# Patient Record
Sex: FEMALE | Race: BLACK OR AFRICAN AMERICAN | NOT HISPANIC OR LATINO | URBAN - METROPOLITAN AREA
[De-identification: names, ages, dates, MRNs, and addresses within clinical notes are randomized per-mention and may not be internally consistent; named-entity substitution may affect disease eponyms.]

---

## 2017-09-07 ENCOUNTER — OUTPATIENT (OUTPATIENT)
Dept: OUTPATIENT SERVICES | Facility: HOSPITAL | Age: 57
LOS: 1 days | Discharge: HOME | End: 2017-09-07
Payer: COMMERCIAL

## 2017-09-07 DIAGNOSIS — R19.4 CHANGE IN BOWEL HABIT: ICD-10-CM

## 2017-09-07 LAB
ANION GAP SERPL CALC-SCNC: 5 MMOL/L — SIGNIFICANT CHANGE UP (ref 5–17)
BASOPHILS # BLD AUTO: 0.1 K/UL — SIGNIFICANT CHANGE UP (ref 0–0.2)
BASOPHILS NFR BLD AUTO: 1.8 % — SIGNIFICANT CHANGE UP (ref 0–2)
BUN SERPL-MCNC: 6 MG/DL — LOW (ref 7–23)
CALCIUM SERPL-MCNC: 8.6 MG/DL — SIGNIFICANT CHANGE UP (ref 8.5–10.1)
CHLORIDE SERPL-SCNC: 98 MMOL/L — SIGNIFICANT CHANGE UP (ref 96–108)
CO2 SERPL-SCNC: 29 MMOL/L — SIGNIFICANT CHANGE UP (ref 22–31)
CREAT SERPL-MCNC: 0.83 MG/DL — SIGNIFICANT CHANGE UP (ref 0.5–1.3)
EOSINOPHIL # BLD AUTO: 0.1 K/UL — SIGNIFICANT CHANGE UP (ref 0–0.5)
EOSINOPHIL NFR BLD AUTO: 1.8 % — SIGNIFICANT CHANGE UP (ref 0–6)
GLUCOSE SERPL-MCNC: 218 MG/DL — HIGH (ref 70–99)
HCT VFR BLD CALC: 37.1 % — SIGNIFICANT CHANGE UP (ref 34.5–45)
HGB BLD-MCNC: 12.5 G/DL — SIGNIFICANT CHANGE UP (ref 11.5–15.5)
LYMPHOCYTES # BLD AUTO: 2.9 K/UL — SIGNIFICANT CHANGE UP (ref 1–3.3)
LYMPHOCYTES # BLD AUTO: 37.2 % — SIGNIFICANT CHANGE UP (ref 13–44)
MCHC RBC-ENTMCNC: 29.7 PG — SIGNIFICANT CHANGE UP (ref 27–34)
MCHC RBC-ENTMCNC: 33.7 GM/DL — SIGNIFICANT CHANGE UP (ref 32–36)
MCV RBC AUTO: 88.4 FL — SIGNIFICANT CHANGE UP (ref 80–100)
MONOCYTES # BLD AUTO: 0.5 K/UL — SIGNIFICANT CHANGE UP (ref 0–0.9)
MONOCYTES NFR BLD AUTO: 6.9 % — SIGNIFICANT CHANGE UP (ref 2–14)
NEUTROPHILS # BLD AUTO: 4.1 K/UL — SIGNIFICANT CHANGE UP (ref 1.8–7.4)
NEUTROPHILS NFR BLD AUTO: 52.3 % — SIGNIFICANT CHANGE UP (ref 43–77)
PLATELET # BLD AUTO: 213 K/UL — SIGNIFICANT CHANGE UP (ref 150–400)
POTASSIUM SERPL-MCNC: 3.7 MMOL/L — SIGNIFICANT CHANGE UP (ref 3.5–5.3)
POTASSIUM SERPL-SCNC: 3.7 MMOL/L — SIGNIFICANT CHANGE UP (ref 3.5–5.3)
RBC # BLD: 4.2 M/UL — SIGNIFICANT CHANGE UP (ref 3.8–5.2)
RBC # FLD: 13 % — SIGNIFICANT CHANGE UP (ref 10.3–14.5)
SODIUM SERPL-SCNC: 132 MMOL/L — LOW (ref 135–145)
WBC # BLD: 7.9 K/UL — SIGNIFICANT CHANGE UP (ref 3.8–10.5)
WBC # FLD AUTO: 7.9 K/UL — SIGNIFICANT CHANGE UP (ref 3.8–10.5)

## 2017-09-07 PROCEDURE — 93010 ELECTROCARDIOGRAM REPORT: CPT

## 2017-09-12 ENCOUNTER — OUTPATIENT (OUTPATIENT)
Dept: OUTPATIENT SERVICES | Facility: HOSPITAL | Age: 57
LOS: 1 days | Discharge: HOME | End: 2017-09-12

## 2017-09-12 VITALS
WEIGHT: 293 LBS | HEART RATE: 53 BPM | SYSTOLIC BLOOD PRESSURE: 152 MMHG | HEIGHT: 67 IN | DIASTOLIC BLOOD PRESSURE: 82 MMHG | TEMPERATURE: 97 F | OXYGEN SATURATION: 96 % | RESPIRATION RATE: 12 BRPM

## 2017-09-12 DIAGNOSIS — Z98.84 BARIATRIC SURGERY STATUS: Chronic | ICD-10-CM

## 2017-09-12 LAB — HCG UR QL: NEGATIVE — SIGNIFICANT CHANGE UP

## 2017-09-12 RX ORDER — SODIUM CHLORIDE 9 MG/ML
1000 INJECTION INTRAMUSCULAR; INTRAVENOUS; SUBCUTANEOUS
Qty: 0 | Refills: 0 | Status: DISCONTINUED | OUTPATIENT
Start: 2017-09-12 | End: 2017-09-27

## 2017-09-12 RX ADMIN — SODIUM CHLORIDE 75 MILLILITER(S): 9 INJECTION INTRAMUSCULAR; INTRAVENOUS; SUBCUTANEOUS at 08:30

## 2017-09-18 DIAGNOSIS — E11.9 TYPE 2 DIABETES MELLITUS WITHOUT COMPLICATIONS: ICD-10-CM

## 2017-09-18 DIAGNOSIS — R15.9 FULL INCONTINENCE OF FECES: ICD-10-CM

## 2017-09-18 DIAGNOSIS — I10 ESSENTIAL (PRIMARY) HYPERTENSION: ICD-10-CM

## 2017-09-18 DIAGNOSIS — Z98.84 BARIATRIC SURGERY STATUS: ICD-10-CM

## 2017-09-18 DIAGNOSIS — E66.01 MORBID (SEVERE) OBESITY DUE TO EXCESS CALORIES: ICD-10-CM

## 2017-09-18 DIAGNOSIS — F31.9 BIPOLAR DISORDER, UNSPECIFIED: ICD-10-CM

## 2018-11-12 ENCOUNTER — INPATIENT (INPATIENT)
Facility: HOSPITAL | Age: 58
LOS: 5 days | Discharge: HOME HEALTH | End: 2018-11-18
Attending: FAMILY MEDICINE | Admitting: FAMILY MEDICINE
Payer: COMMERCIAL

## 2018-11-12 VITALS — OXYGEN SATURATION: 87 % | WEIGHT: 279.99 LBS

## 2018-11-12 DIAGNOSIS — Z98.84 BARIATRIC SURGERY STATUS: Chronic | ICD-10-CM

## 2018-11-12 PROBLEM — I10 ESSENTIAL (PRIMARY) HYPERTENSION: Chronic | Status: ACTIVE | Noted: 2017-09-12

## 2018-11-12 PROBLEM — F31.9 BIPOLAR DISORDER, UNSPECIFIED: Chronic | Status: ACTIVE | Noted: 2017-09-12

## 2018-11-12 LAB
ADD ON TEST-SPECIMEN IN LAB: SIGNIFICANT CHANGE UP
ALBUMIN SERPL ELPH-MCNC: 2.9 G/DL — LOW (ref 3.3–5)
ALP SERPL-CCNC: 101 U/L — SIGNIFICANT CHANGE UP (ref 40–120)
ALT FLD-CCNC: 31 U/L — SIGNIFICANT CHANGE UP (ref 12–78)
ANION GAP SERPL CALC-SCNC: 7 MMOL/L — SIGNIFICANT CHANGE UP (ref 5–17)
APPEARANCE UR: CLEAR — SIGNIFICANT CHANGE UP
APTT BLD: 30.2 SEC — SIGNIFICANT CHANGE UP (ref 27.5–36.3)
AST SERPL-CCNC: 30 U/L — SIGNIFICANT CHANGE UP (ref 15–37)
BASE EXCESS BLDA CALC-SCNC: 10.2 MMOL/L — HIGH (ref -2–2)
BASE EXCESS BLDV CALC-SCNC: 12.8 MMOL/L — HIGH (ref -2–2)
BASOPHILS # BLD AUTO: 0.03 K/UL — SIGNIFICANT CHANGE UP (ref 0–0.2)
BASOPHILS NFR BLD AUTO: 0.6 % — SIGNIFICANT CHANGE UP (ref 0–2)
BILIRUB SERPL-MCNC: 0.7 MG/DL — SIGNIFICANT CHANGE UP (ref 0.2–1.2)
BILIRUB UR-MCNC: NEGATIVE — SIGNIFICANT CHANGE UP
BLOOD GAS COMMENTS ARTERIAL: SIGNIFICANT CHANGE UP
BUN SERPL-MCNC: 10 MG/DL — SIGNIFICANT CHANGE UP (ref 7–23)
CALCIUM SERPL-MCNC: 7.7 MG/DL — LOW (ref 8.5–10.1)
CHLORIDE SERPL-SCNC: 90 MMOL/L — LOW (ref 96–108)
CO2 SERPL-SCNC: 37 MMOL/L — HIGH (ref 22–31)
COLOR SPEC: YELLOW — SIGNIFICANT CHANGE UP
CREAT SERPL-MCNC: 0.97 MG/DL — SIGNIFICANT CHANGE UP (ref 0.5–1.3)
DIFF PNL FLD: NEGATIVE — SIGNIFICANT CHANGE UP
EOSINOPHIL # BLD AUTO: 0.03 K/UL — SIGNIFICANT CHANGE UP (ref 0–0.5)
EOSINOPHIL NFR BLD AUTO: 0.6 % — SIGNIFICANT CHANGE UP (ref 0–6)
FLUBV RNA SPEC QL NAA+PROBE: DETECTED
GAS PNL BLDV: SIGNIFICANT CHANGE UP
GLUCOSE SERPL-MCNC: 359 MG/DL — HIGH (ref 70–99)
GLUCOSE UR QL: 1000 MG/DL
HCO3 BLDA-SCNC: 35 MMOL/L — HIGH (ref 21–29)
HCO3 BLDV-SCNC: 39 MMOL/L — HIGH (ref 21–29)
HCT VFR BLD CALC: 38.3 % — SIGNIFICANT CHANGE UP (ref 34.5–45)
HGB BLD-MCNC: 12.2 G/DL — SIGNIFICANT CHANGE UP (ref 11.5–15.5)
IMM GRANULOCYTES NFR BLD AUTO: 0.4 % — SIGNIFICANT CHANGE UP (ref 0–1.5)
INR BLD: 1.5 RATIO — HIGH (ref 0.88–1.16)
KETONES UR-MCNC: NEGATIVE — SIGNIFICANT CHANGE UP
LACTATE SERPL-SCNC: 1.8 MMOL/L — SIGNIFICANT CHANGE UP (ref 0.7–2)
LEUKOCYTE ESTERASE UR-ACNC: NEGATIVE — SIGNIFICANT CHANGE UP
LIDOCAIN IGE QN: 31 U/L — LOW (ref 73–393)
LYMPHOCYTES # BLD AUTO: 1.4 K/UL — SIGNIFICANT CHANGE UP (ref 1–3.3)
LYMPHOCYTES # BLD AUTO: 26.3 % — SIGNIFICANT CHANGE UP (ref 13–44)
MAGNESIUM SERPL-MCNC: 1.6 MG/DL — SIGNIFICANT CHANGE UP (ref 1.6–2.6)
MCHC RBC-ENTMCNC: 29 PG — SIGNIFICANT CHANGE UP (ref 27–34)
MCHC RBC-ENTMCNC: 31.9 GM/DL — LOW (ref 32–36)
MCV RBC AUTO: 91 FL — SIGNIFICANT CHANGE UP (ref 80–100)
MONOCYTES # BLD AUTO: 0.55 K/UL — SIGNIFICANT CHANGE UP (ref 0–0.9)
MONOCYTES NFR BLD AUTO: 10.3 % — SIGNIFICANT CHANGE UP (ref 2–14)
NEUTROPHILS # BLD AUTO: 3.29 K/UL — SIGNIFICANT CHANGE UP (ref 1.8–7.4)
NEUTROPHILS NFR BLD AUTO: 61.8 % — SIGNIFICANT CHANGE UP (ref 43–77)
NITRITE UR-MCNC: NEGATIVE — SIGNIFICANT CHANGE UP
NT-PROBNP SERPL-SCNC: 5068 PG/ML — HIGH (ref 0–125)
PCO2 BLDA: 50 MMHG — HIGH (ref 32–46)
PCO2 BLDV: 57 MMHG — HIGH (ref 35–50)
PH BLDA: 7.46 — HIGH (ref 7.35–7.45)
PH BLDV: 7.45 — SIGNIFICANT CHANGE UP (ref 7.35–7.45)
PH UR: 7 — SIGNIFICANT CHANGE UP (ref 5–8)
PHOSPHATE SERPL-MCNC: 2.7 MG/DL — SIGNIFICANT CHANGE UP (ref 2.5–4.5)
PLATELET # BLD AUTO: 207 K/UL — SIGNIFICANT CHANGE UP (ref 150–400)
PO2 BLDA: 68 MMHG — LOW (ref 74–108)
PO2 BLDV: 48 MMHG — HIGH (ref 25–45)
POTASSIUM SERPL-MCNC: 2.5 MMOL/L — CRITICAL LOW (ref 3.5–5.3)
POTASSIUM SERPL-SCNC: 2.5 MMOL/L — CRITICAL LOW (ref 3.5–5.3)
PROT SERPL-MCNC: 6.8 GM/DL — SIGNIFICANT CHANGE UP (ref 6–8.3)
PROT UR-MCNC: NEGATIVE MG/DL — SIGNIFICANT CHANGE UP
PROTHROM AB SERPL-ACNC: 16.9 SEC — HIGH (ref 10–12.9)
RAPID RVP RESULT: DETECTED
RBC # BLD: 4.21 M/UL — SIGNIFICANT CHANGE UP (ref 3.8–5.2)
RBC # FLD: 15.2 % — HIGH (ref 10.3–14.5)
SAO2 % BLDA: 90 % — LOW (ref 92–96)
SAO2 % BLDV: 78 % — SIGNIFICANT CHANGE UP (ref 67–88)
SODIUM SERPL-SCNC: 134 MMOL/L — LOW (ref 135–145)
SP GR SPEC: 1 — LOW (ref 1.01–1.02)
TROPONIN I SERPL-MCNC: 0.04 NG/ML — SIGNIFICANT CHANGE UP (ref 0.01–0.04)
UROBILINOGEN FLD QL: NEGATIVE MG/DL — SIGNIFICANT CHANGE UP
WBC # BLD: 5.32 K/UL — SIGNIFICANT CHANGE UP (ref 3.8–10.5)
WBC # FLD AUTO: 5.32 K/UL — SIGNIFICANT CHANGE UP (ref 3.8–10.5)

## 2018-11-12 PROCEDURE — 71275 CT ANGIOGRAPHY CHEST: CPT | Mod: 26

## 2018-11-12 PROCEDURE — 99285 EMERGENCY DEPT VISIT HI MDM: CPT

## 2018-11-12 PROCEDURE — 93010 ELECTROCARDIOGRAM REPORT: CPT

## 2018-11-12 PROCEDURE — 71045 X-RAY EXAM CHEST 1 VIEW: CPT | Mod: 26

## 2018-11-12 RX ORDER — POTASSIUM CHLORIDE 20 MEQ
10 PACKET (EA) ORAL ONCE
Qty: 0 | Refills: 0 | Status: COMPLETED | OUTPATIENT
Start: 2018-11-12 | End: 2018-11-12

## 2018-11-12 RX ORDER — GLIMEPIRIDE 1 MG
1 TABLET ORAL
Qty: 0 | Refills: 0 | COMMUNITY

## 2018-11-12 RX ORDER — VENLAFAXINE HCL 75 MG
1 CAPSULE, EXT RELEASE 24 HR ORAL
Qty: 0 | Refills: 0 | COMMUNITY

## 2018-11-12 RX ORDER — POTASSIUM CHLORIDE 20 MEQ
40 PACKET (EA) ORAL ONCE
Qty: 0 | Refills: 0 | Status: COMPLETED | OUTPATIENT
Start: 2018-11-12 | End: 2018-11-12

## 2018-11-12 RX ORDER — SODIUM CHLORIDE 9 MG/ML
1000 INJECTION INTRAMUSCULAR; INTRAVENOUS; SUBCUTANEOUS ONCE
Qty: 0 | Refills: 0 | Status: COMPLETED | OUTPATIENT
Start: 2018-11-12 | End: 2018-11-12

## 2018-11-12 RX ORDER — OLANZAPINE 15 MG/1
1 TABLET, FILM COATED ORAL
Qty: 0 | Refills: 0 | COMMUNITY

## 2018-11-12 RX ORDER — CEFTRIAXONE 500 MG/1
1000 INJECTION, POWDER, FOR SOLUTION INTRAMUSCULAR; INTRAVENOUS EVERY 24 HOURS
Qty: 0 | Refills: 0 | Status: DISCONTINUED | OUTPATIENT
Start: 2018-11-12 | End: 2018-11-13

## 2018-11-12 RX ORDER — CITALOPRAM 10 MG/1
1 TABLET, FILM COATED ORAL
Qty: 0 | Refills: 0 | COMMUNITY

## 2018-11-12 RX ORDER — ALBUTEROL 90 UG/1
2.5 AEROSOL, METERED ORAL ONCE
Qty: 0 | Refills: 0 | Status: COMPLETED | OUTPATIENT
Start: 2018-11-12 | End: 2018-11-12

## 2018-11-12 RX ORDER — ALPRAZOLAM 0.25 MG
1 TABLET ORAL
Qty: 0 | Refills: 0 | COMMUNITY

## 2018-11-12 RX ORDER — AZITHROMYCIN 500 MG/1
500 TABLET, FILM COATED ORAL EVERY 24 HOURS
Qty: 0 | Refills: 0 | Status: DISCONTINUED | OUTPATIENT
Start: 2018-11-12 | End: 2018-11-13

## 2018-11-12 RX ADMIN — Medication 10 MILLIEQUIVALENT(S): at 18:15

## 2018-11-12 RX ADMIN — SODIUM CHLORIDE 1000 MILLILITER(S): 9 INJECTION INTRAMUSCULAR; INTRAVENOUS; SUBCUTANEOUS at 18:15

## 2018-11-12 RX ADMIN — AZITHROMYCIN 500 MILLIGRAM(S): 500 TABLET, FILM COATED ORAL at 17:15

## 2018-11-12 RX ADMIN — Medication 40 MILLIEQUIVALENT(S): at 17:07

## 2018-11-12 RX ADMIN — AZITHROMYCIN 255 MILLIGRAM(S): 500 TABLET, FILM COATED ORAL at 16:31

## 2018-11-12 RX ADMIN — SODIUM CHLORIDE 500 MILLILITER(S): 9 INJECTION INTRAMUSCULAR; INTRAVENOUS; SUBCUTANEOUS at 15:25

## 2018-11-12 RX ADMIN — ALBUTEROL 2.5 MILLIGRAM(S): 90 AEROSOL, METERED ORAL at 16:36

## 2018-11-12 RX ADMIN — CEFTRIAXONE 1000 MILLIGRAM(S): 500 INJECTION, POWDER, FOR SOLUTION INTRAMUSCULAR; INTRAVENOUS at 16:29

## 2018-11-12 RX ADMIN — Medication 100 MILLIEQUIVALENT(S): at 16:31

## 2018-11-12 RX ADMIN — Medication 10 MILLIEQUIVALENT(S): at 20:00

## 2018-11-12 RX ADMIN — Medication 100 MILLIEQUIVALENT(S): at 18:47

## 2018-11-12 NOTE — ED PROVIDER NOTE - ATTENDING CONTRIBUTION TO CARE
Dr. Álvarez: I have personally performed a face to face bedside history and physical examination of this patient. I have discussed the history, examination, review of systems, assessment and plan of management with the resident. I have reviewed the electronic medical record and amended it to reflect my history, review of systems, physical exam, assessment and plan.

## 2018-11-12 NOTE — ED PROVIDER NOTE - CARE PLAN
Principal Discharge DX:	Influenza B  Secondary Diagnosis:	Hypoxia  Secondary Diagnosis:	Acute congestive heart failure, unspecified heart failure type

## 2018-11-12 NOTE — H&P ADULT - NSHPOUTPATIENTPROVIDERS_GEN_ALL_CORE
[facility]; Dr. Castillo  Pulmonology; Dr. Lopez PCP; Dr. Castillo  Pulmonology; Dr. Lopez  Psychiatry; Dr. Carlos Dominguez

## 2018-11-12 NOTE — H&P ADULT - ASSESSMENT
57 y/o F PMHx significant for hypertension, bipolar disorder, and diabetes mellitus type 2, who was referred to the ED by her PCP for further evaluation of a 1 week hx of progressive generalized weakness/malaise, subjective fevers, shortness of breath/hypoxia (SaO2 81%), and associated cough. The patient was initially evaluated at a local urgent care center earlier last week (11/6) and was prescribed a course of Azithromycin which she completed as prescribed without improvement prompting her second visit (11/10) to the local urgent care center where she was prescribed a 10day regimen. The patient's symptoms progressed further prompting her visit to her PCP today (11/12). The patient was evaluated/co-managed w/ Pulmonology who referred the patient to the ED. In the ED the patient was found to be hypoxic SaO2 87%. Labs=> Na 134, K 2.5, HCO3 37, , pro-BNP 5068, ABG 7.46/50/68/90. RVP (+). CTA Chest => No pulmonary embolism. No infiltrate. Cardiomegaly. Findings suggesting pulmonary hypertension. Mosaic attenuation of the lungs suggesting air trapping. 57 y/o F PMHx significant for hypertension, bipolar disorder, and diabetes mellitus type 2, who was referred to the ED by her PCP for further evaluation of a 1 week hx of progressive generalized weakness/malaise, subjective fevers, shortness of breath/hypoxia (SaO2 81%), and associated cough. The patient was initially evaluated at a local urgent care center earlier last week (11/6) and was prescribed a course of Azithromycin which she completed as prescribed without improvement prompting her second visit (11/10) to the local urgent care center where she was prescribed a 10day regimen. The patient's symptoms progressed further prompting her visit to her PCP today (11/12). The patient was evaluated/co-managed w/ Pulmonology who referred the patient to the ED. In the ED the patient was found to be hypoxic SaO2 87%. Labs=> Na 134, K 2.5, HCO3 37, , pro-BNP 5068, ABG 7.46/50/68/90. RVP (+). CTA Chest => No pulmonary embolism. No infiltrate. Cardiomegaly. Findings suggesting pulmonary hypertension. Mosaic attenuation of the lungs suggesting air trapping.    1)Influenza B complicated by Acute Hypoxic Respiratory Failure  ~admit to Telemetry  ~f/u PAN C+S  ~cont. trial BiPAP  ~repeat ABG in the am  ~f/u w/ Pulmonology in the am  ~ 59 y/o F PMHx significant for hypertension, bipolar disorder, and diabetes mellitus type 2, who was referred to the ED by her PCP for further evaluation of a 1 week hx of progressive generalized weakness/malaise, subjective fevers, shortness of breath/hypoxia (SaO2 81%), and associated cough. The patient was initially evaluated at a local urgent care center earlier last week (11/6) and was prescribed a course of Azithromycin which she completed as prescribed without improvement prompting her second visit (11/10) to the local urgent care center where she was prescribed a 10day regimen. The patient's symptoms progressed further prompting her visit to her PCP today (11/12). The patient was evaluated/co-managed w/ Pulmonology who referred the patient to the ED. In the ED the patient was found to be hypoxic SaO2 87%. Labs=> Na 134, K 2.5, HCO3 37, , pro-BNP 5068, ABG 7.46/50/68/90. RVP (+). CTA Chest => No pulmonary embolism. No infiltrate. Cardiomegaly. Findings suggesting pulmonary hypertension. Mosaic attenuation of the lungs suggesting air trapping.    1)Influenza B complicated by Acute Hypoxic Respiratory Failure  ~admit to Telemetry  ~f/u PAN C+S  ~cont. trial BiPAP  ~repeat ABG in the am  ~f/u w/ Pulmonology in the am 59 y/o F PMHx significant for hypertension, bipolar disorder, anxiety, ADHD, and diabetes mellitus type 2, who was referred to the ED by her PCP for further evaluation of a 1 week hx of progressive generalized weakness/malaise, subjective fevers, shortness of breath/hypoxia (SaO2 81%), and associated cough. The patient was initially evaluated at a local urgent care center earlier last week (11/6) and was prescribed a course of Azithromycin which she completed as prescribed without improvement prompting her second visit (11/10) to the local urgent care center where she was prescribed a 10day regimen. The patient's symptoms progressed further prompting her visit to her PCP today (11/12). The patient was evaluated/co-managed w/ Pulmonology who referred the patient to the ED. In the ED the patient was found to be hypoxic SaO2 87%. Labs=> Na 134, K 2.5, HCO3 37, , pro-BNP 5068, ABG 7.46/50/68/90. RVP (+). CTA Chest => No pulmonary embolism. No infiltrate. Cardiomegaly. Findings suggesting pulmonary hypertension. Mosaic attenuation of the lungs suggesting air trapping.    1)Influenza B complicated by Acute Hypoxic Respiratory Failure  ~admit to Telemetry  ~f/u PAN C+S  ~cont. trial BiPAP  ~repeat ABG in the am  ~f/u w/ Pulmonology in the am    2)Hypertension  ~cont. Losartan 100mg po daily    3)Bipolar disorder/Anxiety/Depression  ~cont. Aripiprazole 15mg po daily  ~cont. Risperidone 2mg po bid  ~cont. Olanzapine 5mg po qHS  ~cont. Effexor XR 75mg 3tabs po daily  ~cont. Citalopram 20mg po daily  ~cont. Xanax 2mg po qid    4)Diabetes mellitus type 2  ~FS qAC/HS  ~cont. ISS per protocol    5)Vte ppx  IMPROVE VTE Risk Score is 0  [  ] Previous VTE                                                  3  [  ] Thrombophilia                                               2  [  ] Lower limb paralysis                                      2        (unable to hold up >15 seconds)    [  ] Current Cancer                                              2         (within 6 months)  [  ] Immobilization > 24 hrs                                1  [  ] ICU/CCU stay > 24 hours                              1  [  ] Age > 60                                                      1  ~cont. SCDs for now 57 y/o F PMHx significant for hypertension, bipolar disorder, anxiety, ADHD, and diabetes mellitus type 2, who was referred to the ED by her PCP for further evaluation of a 1 week hx of progressive generalized weakness/malaise, subjective fevers, shortness of breath/hypoxia (SaO2 81%), and associated cough. The patient was initially evaluated at a local urgent care center earlier last week (11/6) and was prescribed a course of Azithromycin which she completed as prescribed without improvement prompting her second visit (11/10) to the local urgent care center where she was prescribed a 10day regimen. The patient's symptoms progressed further prompting her visit to her PCP today (11/12). The patient was evaluated/co-managed w/ Pulmonology who referred the patient to the ED. In the ED the patient was found to be hypoxic SaO2 87%. Labs=> Na 134, K 2.5, HCO3 37, , pro-BNP 5068, ABG 7.46/50/68/90. RVP (+). CTA Chest => No pulmonary embolism. No infiltrate. Cardiomegaly. Findings suggesting pulmonary hypertension. Mosaic attenuation of the lungs suggesting air trapping.    1)Influenza B complicated by Acute Hypoxic Respiratory Failure  ~admit to Telemetry  ~f/u PAN C+S  ~cont. trial BiPAP   ~repeat ABG in the am  ~f/u w/ Pulmonology in the am    2)Elevated pro-BNP  ~DDx includes demand ischemia vs component of CHF?   ~f/u 2DECHO in the am   ~serial Sendy/EKGs  ~f/u w/ Cardiology in the am    3)Hypertension  ~cont. Losartan 100mg po daily    4)Bipolar disorder/Anxiety/Depression  ~cont. Aripiprazole 15mg po daily  ~cont. Risperidone 2mg po bid  ~cont. Olanzapine 5mg po qHS  ~cont. Effexor XR 75mg 3tabs po daily  ~cont. Citalopram 20mg po daily  ~cont. Xanax 2mg po qid  ~consider consulting Psychiatry (poly-pharmacy?)    5)Diabetes mellitus type 2  ~FS qAC/HS  ~cont. ISS per protocol    6)Hypokalemia  ~supplemented in the ED  ~f/u am labs    7)Vte ppx  IMPROVE VTE Risk Score is 0  [  ] Previous VTE                                                  3  [  ] Thrombophilia                                               2  [  ] Lower limb paralysis                                      2        (unable to hold up >15 seconds)    [  ] Current Cancer                                              2         (within 6 months)  [  ] Immobilization > 24 hrs                                1  [  ] ICU/CCU stay > 24 hours                              1  [  ] Age > 60                                                      1  ~cont. SCDs for now 59 y/o F PMHx significant for hypertension, bipolar disorder, anxiety, ADHD, and diabetes mellitus type 2, who was referred to the ED by her PCP for further evaluation of a 1 week hx of progressive generalized weakness/malaise, subjective fevers, shortness of breath/hypoxia (SaO2 81%), and associated cough. The patient was initially evaluated at a local urgent care center earlier last week (11/6) and was prescribed a course of Azithromycin which she completed as prescribed without improvement prompting her second visit (11/10) to the local urgent care center where she was prescribed a 10day regimen. The patient's symptoms progressed further prompting her visit to her PCP today (11/12). The patient was evaluated/co-managed w/ Pulmonology who referred the patient to the ED. In the ED the patient was found to be hypoxic SaO2 87%. Labs=> Na 134, K 2.5, HCO3 37, , pro-BNP 5068, ABG 7.46/50/68/90. RVP (+). CTA Chest => No pulmonary embolism. No infiltrate. Cardiomegaly. Findings suggesting pulmonary hypertension. Mosaic attenuation of the lungs suggesting air trapping.    1)Influenza B complicated by Acute Hypoxic Respiratory Failure  ~admit to Telemetry  ~f/u PAN C+S  ~cont. trial BiPAP   ~repeat ABG in the am  ~f/u w/ Pulmonology in the am  ~cont. Oseltamivir  ~f/u w/ ID consultation in the am    2)Elevated pro-BNP  ~DDx includes CHF?   ~f/u 2DECHO in the am   ~serial Sendy/EKGs  ~f/u w/ Cardiology in the am    3)Hypertension  ~cont. Losartan 100mg po daily    4)Bipolar disorder/Anxiety/Depression  ~cont. Aripiprazole 15mg po daily  ~cont. Risperidone 2mg po bid  ~cont. Olanzapine 5mg po qHS  ~cont. Effexor XR 75mg 3tabs po daily  ~cont. Citalopram 20mg po daily  ~cont. Xanax 2mg po qid  ~consider consulting Psychiatry (poly-pharmacy? Note: medications reconciled with ED pharmacist.)    5)Diabetes mellitus type 2  ~FS qAC/HS  ~cont. ISS per protocol    6)Hypokalemia  ~supplemented in the ED  ~f/u am labs    7)Vte ppx  IMPROVE VTE Risk Score is 0  [  ] Previous VTE                                                  3  [  ] Thrombophilia                                               2  [  ] Lower limb paralysis                                      2        (unable to hold up >15 seconds)    [  ] Current Cancer                                              2         (within 6 months)  [  ] Immobilization > 24 hrs                                1  [  ] ICU/CCU stay > 24 hours                              1  [  ] Age > 60                                                      1  ~cont. SCDs for now 59 y/o F PMHx significant for hypertension, bipolar disorder, anxiety, ADHD, and diabetes mellitus type 2, who was referred to the ED by her PCP for further evaluation of a 1 week hx of progressive generalized weakness/malaise, subjective fevers, shortness of breath/hypoxia (SaO2 81%), and associated cough. The patient was initially evaluated at a local urgent care center earlier last week (11/6) and was prescribed a course of Azithromycin which she completed as prescribed without improvement prompting her second visit (11/10) to the local urgent care center where she was prescribed a 10day regimen. The patient's symptoms progressed further prompting her visit to her PCP today (11/12). The patient was evaluated/co-managed w/ Pulmonology who referred the patient to the ED. In the ED the patient was found to be hypoxic SaO2 87%. Labs=> Na 134, K 2.5, HCO3 37, , pro-BNP 5068, ABG 7.46/50/68/90. RVP (+). CTA Chest => No pulmonary embolism. No infiltrate. Cardiomegaly. Findings suggesting pulmonary hypertension. Mosaic attenuation of the lungs suggesting air trapping.    1)Influenza B complicated by Acute Hypoxic Respiratory Failure  ~admit to Telemetry  ~f/u PAN C+S  ~cont. trial BiPAP   ~repeat ABG in the am  ~f/u w/ Pulmonology in the am  ~cont. Oseltamivir  ~f/u w/ ID consultation in the am    2)Elevated pro-BNP  ~DDx includes CHF?   ~f/u 2DECHO in the am   ~serial Sendy/EKGs  ~f/u w/ Cardiology in the am    3)Hypertension  ~cont. Losartan 100mg po daily    4)Bipolar disorder/Anxiety/Depression  ~consider consulting Psychiatry (poly-pharmacy? Note: medications reconciled with ED pharmacist.)  ~takes Aripiprazole 15mg po daily, Risperidone 2mg po bid, Olanzapine 5mg po qHS, Effexor XR 75mg 3tabs po daily, Citalopram 20mg po bid, and Xanax 2mg po qid.    5)Diabetes mellitus type 2  ~FS qAC/HS  ~cont. ISS per protocol    6)Hypokalemia  ~supplemented in the ED  ~f/u am labs    7)Vte ppx  IMPROVE VTE Risk Score is 0  [  ] Previous VTE                                                  3  [  ] Thrombophilia                                               2  [  ] Lower limb paralysis                                      2        (unable to hold up >15 seconds)    [  ] Current Cancer                                              2         (within 6 months)  [  ] Immobilization > 24 hrs                                1  [  ] ICU/CCU stay > 24 hours                              1  [  ] Age > 60                                                      1  ~cont. SCDs for now

## 2018-11-12 NOTE — H&P ADULT - PMH
Bipolar 1 disorder    Diabetes mellitus    Hypertension Bipolar 1 disorder    Diabetes mellitus    Hypertension    Pulmonary HTN Bipolar 1 disorder    Diabetes mellitus  Type 2  Hypertension Bipolar 1 disorder    Diabetes mellitus  Type 2  Hypertension    COLLEEN (obstructive sleep apnea) ADHD (attention deficit hyperactivity disorder)    Bipolar 1 disorder    Diabetes mellitus  Type 2  Hypertension    COLLEEN (obstructive sleep apnea)

## 2018-11-12 NOTE — ED ADULT TRIAGE NOTE - CHIEF COMPLAINT QUOTE
Pt presents to ED sent by Pulmonologist for SOB and hypoxia. Pt is 87% in room air and unable to speak in full sentences. Pt denies hx of COPD and CHF. Pt taken directly to main ED for EKG and VS

## 2018-11-12 NOTE — H&P ADULT - NSHPPHYSICALEXAM_GEN_ALL_CORE
Vital Signs Last 24 Hrs  T(C): 37.7 (12 Nov 2018 15:25), Max: 37.7 (12 Nov 2018 15:25)  T(F): 99.8 (12 Nov 2018 15:25), Max: 99.8 (12 Nov 2018 15:25)  HR: 82 (12 Nov 2018 18:48) (82 - 84)  BP: 139/88 (12 Nov 2018 18:48) (139/88 - 150/101)  RR: 22 (12 Nov 2018 18:48) (22 - 24)  SpO2: 98% (12 Nov 2018 18:48) (87% - 98%)

## 2018-11-12 NOTE — ED ADULT NURSE NOTE - NSIMPLEMENTINTERV_GEN_ALL_ED
Implemented All Universal Safety Interventions:  [place] to call system. Call bell, personal items and telephone within reach. Instruct patient to call for assistance. Room bathroom lighting operational. Non-slip footwear when patient is off stretcher. Physically safe environment: no spills, clutter or unnecessary equipment. Stretcher in lowest position, wheels locked, appropriate side rails in place.

## 2018-11-12 NOTE — ED PROVIDER NOTE - MEDICAL DECISION MAKING DETAILS
-initial impression: symptoms likely due to pna vs uri. if no clear pna on cxr will consider ct angio to eval for pe since pt hypoxic.   -initial plan: labs, EKG, CXR, symptomatic treatment w/  IVF, albuterol -reassess, -consider admission

## 2018-11-12 NOTE — ED PROVIDER NOTE - OBJECTIVE STATEMENT
57yo F pmh hnt, hld, dm, bipolar d/o p/w oakes [id number]. also having dry cough, fatigue. + sick contacts. saw pmd [place] where pt was so2 was 81%on RA so sent for further eval. finished azithromycin from urgent care w/o relief. ROS negative for: fever, chest pain, Nausea, vomiting, diarrhea, abdominal pain, smoking hx

## 2018-11-12 NOTE — H&P ADULT - HISTORY OF PRESENT ILLNESS
57 y/o F PMHx significant for hypertension, bipolar disorder, and diabetes mellitus type 2, who was referred to the ED by her PCP for further evaluation of a 1 week hx of progressive generalized weakness/malaise, subjective fevers, shortness of breath/hypoxia (SaO2 81%), and associated cough. The patient was initially evaluated at a local urgent care center earlier last week (11/6) and was prescribed a course of Azithromycin which she completed as prescribed without improvement prompting her second visit (11/10) to the local urgent care center where she was prescribed a 10day regimen. The patient's symptoms progressed further prompting her visit to her PCP today (11/12). The patient was evaluated/co-managed w/ Pulmonology who referred the patient to the ED. In the ED the patient was found to be hypoxic SaO2 87%. Labs=> Na 134, K 2.5, HCO3 37, , pro-BNP 5068, ABG 7.46/50/68/90. RVP (+). CTA Chest => No pulmonary embolism. No infiltrate. Cardiomegaly. Findings suggesting pulmonary hypertension. Mosaic attenuation of the lungs suggesting air trapping. 57 y/o F PMHx significant for hypertension, bipolar disorder, anxiety, ADHD, and diabetes mellitus type 2, who was referred to the ED by her PCP for further evaluation of a 1 week hx of progressive generalized weakness/malaise, subjective fevers, shortness of breath/hypoxia (SaO2 81%), and associated cough. The patient was initially evaluated at a local urgent care center earlier last week (11/6) and was prescribed a course of Azithromycin which she completed as prescribed without improvement prompting her second visit (11/10) to the local urgent care center where she was prescribed a 10day regimen. The patient's symptoms progressed further prompting her visit to her PCP today (11/12). The patient was evaluated/co-managed w/ Pulmonology who referred the patient to the ED. In the ED the patient was found to be hypoxic SaO2 87%. Labs=> Na 134, K 2.5, HCO3 37, , pro-BNP 5068, ABG 7.46/50/68/90. RVP (+). CTA Chest => No pulmonary embolism. No infiltrate. Cardiomegaly. Findings suggesting pulmonary hypertension. Mosaic attenuation of the lungs suggesting air trapping.

## 2018-11-12 NOTE — ED PROVIDER NOTE - PROGRESS NOTE DETAILS
Rj MATT for Dr. Álvarez: 59 y/o female with PMHx of HTN, HLD, bipolar presents to the ED c/o cough, orthopnea and NICOLE x 1 week. Also c/o weakness and fatigue. Pt was taking Zithromax outpatient without relief of sx. F/U with Dr. Dooley today who found pt with 81% O2 sat on room air and sent to the ED for evaluation. Denies fever, CP. [place]- PCP. Nonsmoker.  CONSTITUTIONAL: Black female adult, no sentence shortening nontoxic. well appearing, well nourished, and in no apparent distress. EYES: clear bilaterally.  Pupils equal, round, and reactive to light. ENMT: Nasal mucosa clear.  Mouth with normal mucosa  Throat has no vesicles, no oropharyngeal exudates and uvula is midline. CARDIAC: normal rate, regular rhythm, and no murmur. Normal radial pulses. RESPIRATORY: breath sounds clear and equal bilaterally. GASTROINTESTINAL: abdomen soft, non-tender and non-distended. Bowel sounds +. MUSCULOSKELETAL: range of motion is not limited and there is no muscle tenderness. ASHLEY x4. No focal deficits, or swelling. NEUROLOGICAL: sensation is normal and strength is normal. SKIN: skin normal color for race, warm, dry and intact.

## 2018-11-13 DIAGNOSIS — J96.01 ACUTE RESPIRATORY FAILURE WITH HYPOXIA: ICD-10-CM

## 2018-11-13 DIAGNOSIS — Z29.9 ENCOUNTER FOR PROPHYLACTIC MEASURES, UNSPECIFIED: ICD-10-CM

## 2018-11-13 DIAGNOSIS — I10 ESSENTIAL (PRIMARY) HYPERTENSION: ICD-10-CM

## 2018-11-13 DIAGNOSIS — J10.1 INFLUENZA DUE TO OTHER IDENTIFIED INFLUENZA VIRUS WITH OTHER RESPIRATORY MANIFESTATIONS: ICD-10-CM

## 2018-11-13 DIAGNOSIS — F31.9 BIPOLAR DISORDER, UNSPECIFIED: ICD-10-CM

## 2018-11-13 DIAGNOSIS — E11.9 TYPE 2 DIABETES MELLITUS WITHOUT COMPLICATIONS: ICD-10-CM

## 2018-11-13 LAB
ALBUMIN SERPL ELPH-MCNC: 2.6 G/DL — LOW (ref 3.3–5)
ALP SERPL-CCNC: 87 U/L — SIGNIFICANT CHANGE UP (ref 40–120)
ALT FLD-CCNC: 27 U/L — SIGNIFICANT CHANGE UP (ref 12–78)
ANION GAP SERPL CALC-SCNC: 7 MMOL/L — SIGNIFICANT CHANGE UP (ref 5–17)
AST SERPL-CCNC: 27 U/L — SIGNIFICANT CHANGE UP (ref 15–37)
BASE EXCESS BLDA CALC-SCNC: 12 MMOL/L — HIGH (ref -2–2)
BASOPHILS # BLD AUTO: 0.01 K/UL — SIGNIFICANT CHANGE UP (ref 0–0.2)
BASOPHILS NFR BLD AUTO: 0.3 % — SIGNIFICANT CHANGE UP (ref 0–2)
BILIRUB SERPL-MCNC: 0.6 MG/DL — SIGNIFICANT CHANGE UP (ref 0.2–1.2)
BLOOD GAS COMMENTS ARTERIAL: SIGNIFICANT CHANGE UP
BUN SERPL-MCNC: 6 MG/DL — LOW (ref 7–23)
CALCIUM SERPL-MCNC: 7.7 MG/DL — LOW (ref 8.5–10.1)
CHLORIDE SERPL-SCNC: 95 MMOL/L — LOW (ref 96–108)
CO2 SERPL-SCNC: 38 MMOL/L — HIGH (ref 22–31)
CREAT SERPL-MCNC: 0.68 MG/DL — SIGNIFICANT CHANGE UP (ref 0.5–1.3)
CULTURE RESULTS: NO GROWTH — SIGNIFICANT CHANGE UP
EOSINOPHIL # BLD AUTO: 0.04 K/UL — SIGNIFICANT CHANGE UP (ref 0–0.5)
EOSINOPHIL NFR BLD AUTO: 1.3 % — SIGNIFICANT CHANGE UP (ref 0–6)
GAS PNL BLDA: SIGNIFICANT CHANGE UP
GLUCOSE BLDC GLUCOMTR-MCNC: 201 MG/DL — HIGH (ref 70–99)
GLUCOSE BLDC GLUCOMTR-MCNC: 255 MG/DL — HIGH (ref 70–99)
GLUCOSE BLDC GLUCOMTR-MCNC: 264 MG/DL — HIGH (ref 70–99)
GLUCOSE BLDC GLUCOMTR-MCNC: 281 MG/DL — HIGH (ref 70–99)
GLUCOSE BLDC GLUCOMTR-MCNC: 313 MG/DL — HIGH (ref 70–99)
GLUCOSE SERPL-MCNC: 284 MG/DL — HIGH (ref 70–99)
HBA1C BLD-MCNC: 9.2 % — HIGH (ref 4–5.6)
HCO3 BLDA-SCNC: 39 MMOL/L — HIGH (ref 21–29)
HCT VFR BLD CALC: 36.7 % — SIGNIFICANT CHANGE UP (ref 34.5–45)
HGB BLD-MCNC: 11.5 G/DL — SIGNIFICANT CHANGE UP (ref 11.5–15.5)
IMM GRANULOCYTES NFR BLD AUTO: 0.6 % — SIGNIFICANT CHANGE UP (ref 0–1.5)
LYMPHOCYTES # BLD AUTO: 0.99 K/UL — LOW (ref 1–3.3)
LYMPHOCYTES # BLD AUTO: 31.6 % — SIGNIFICANT CHANGE UP (ref 13–44)
MAGNESIUM SERPL-MCNC: 1.7 MG/DL — SIGNIFICANT CHANGE UP (ref 1.6–2.6)
MCHC RBC-ENTMCNC: 29 PG — SIGNIFICANT CHANGE UP (ref 27–34)
MCHC RBC-ENTMCNC: 31.3 GM/DL — LOW (ref 32–36)
MCV RBC AUTO: 92.7 FL — SIGNIFICANT CHANGE UP (ref 80–100)
MONOCYTES # BLD AUTO: 0.4 K/UL — SIGNIFICANT CHANGE UP (ref 0–0.9)
MONOCYTES NFR BLD AUTO: 12.8 % — SIGNIFICANT CHANGE UP (ref 2–14)
NEUTROPHILS # BLD AUTO: 1.67 K/UL — LOW (ref 1.8–7.4)
NEUTROPHILS NFR BLD AUTO: 53.4 % — SIGNIFICANT CHANGE UP (ref 43–77)
NRBC # BLD: 0 /100 WBCS — SIGNIFICANT CHANGE UP (ref 0–0)
PCO2 BLDA: 63 MMHG — HIGH (ref 32–46)
PH BLDA: 7.4 — SIGNIFICANT CHANGE UP (ref 7.35–7.45)
PLATELET # BLD AUTO: 175 K/UL — SIGNIFICANT CHANGE UP (ref 150–400)
PO2 BLDA: SIGNIFICANT CHANGE UP MMHG (ref 74–108)
POTASSIUM SERPL-MCNC: 2.3 MMOL/L — CRITICAL LOW (ref 3.5–5.3)
POTASSIUM SERPL-SCNC: 2.3 MMOL/L — CRITICAL LOW (ref 3.5–5.3)
PROT SERPL-MCNC: 6.3 GM/DL — SIGNIFICANT CHANGE UP (ref 6–8.3)
RBC # BLD: 3.96 M/UL — SIGNIFICANT CHANGE UP (ref 3.8–5.2)
RBC # FLD: 15.3 % — HIGH (ref 10.3–14.5)
SAO2 % BLDA: 96 % — SIGNIFICANT CHANGE UP (ref 92–96)
SODIUM SERPL-SCNC: 140 MMOL/L — SIGNIFICANT CHANGE UP (ref 135–145)
SPECIMEN SOURCE: SIGNIFICANT CHANGE UP
TROPONIN I SERPL-MCNC: 0.03 NG/ML — SIGNIFICANT CHANGE UP (ref 0.01–0.04)
TSH SERPL-MCNC: 1.48 UU/ML — SIGNIFICANT CHANGE UP (ref 0.34–4.82)
WBC # BLD: 3.13 K/UL — LOW (ref 3.8–10.5)
WBC # FLD AUTO: 3.13 K/UL — LOW (ref 3.8–10.5)

## 2018-11-13 PROCEDURE — 93306 TTE W/DOPPLER COMPLETE: CPT | Mod: 26

## 2018-11-13 RX ORDER — ALPRAZOLAM 0.25 MG
2 TABLET ORAL
Qty: 0 | Refills: 0 | Status: DISCONTINUED | OUTPATIENT
Start: 2018-11-13 | End: 2018-11-18

## 2018-11-13 RX ORDER — GLUCAGON INJECTION, SOLUTION 0.5 MG/.1ML
1 INJECTION, SOLUTION SUBCUTANEOUS ONCE
Qty: 0 | Refills: 0 | Status: DISCONTINUED | OUTPATIENT
Start: 2018-11-13 | End: 2018-11-18

## 2018-11-13 RX ORDER — LOSARTAN POTASSIUM 100 MG/1
100 TABLET, FILM COATED ORAL DAILY
Qty: 0 | Refills: 0 | Status: DISCONTINUED | OUTPATIENT
Start: 2018-11-13 | End: 2018-11-18

## 2018-11-13 RX ORDER — DOCUSATE SODIUM 100 MG
100 CAPSULE ORAL THREE TIMES A DAY
Qty: 0 | Refills: 0 | Status: DISCONTINUED | OUTPATIENT
Start: 2018-11-13 | End: 2018-11-18

## 2018-11-13 RX ORDER — DEXTROSE 50 % IN WATER 50 %
15 SYRINGE (ML) INTRAVENOUS ONCE
Qty: 0 | Refills: 0 | Status: DISCONTINUED | OUTPATIENT
Start: 2018-11-13 | End: 2018-11-18

## 2018-11-13 RX ORDER — ONDANSETRON 8 MG/1
4 TABLET, FILM COATED ORAL EVERY 6 HOURS
Qty: 0 | Refills: 0 | Status: DISCONTINUED | OUTPATIENT
Start: 2018-11-13 | End: 2018-11-18

## 2018-11-13 RX ORDER — SODIUM CHLORIDE 9 MG/ML
1000 INJECTION, SOLUTION INTRAVENOUS
Qty: 0 | Refills: 0 | Status: DISCONTINUED | OUTPATIENT
Start: 2018-11-13 | End: 2018-11-18

## 2018-11-13 RX ORDER — POTASSIUM CHLORIDE 20 MEQ
40 PACKET (EA) ORAL EVERY 4 HOURS
Qty: 0 | Refills: 0 | Status: COMPLETED | OUTPATIENT
Start: 2018-11-13 | End: 2018-11-13

## 2018-11-13 RX ORDER — SENNA PLUS 8.6 MG/1
2 TABLET ORAL AT BEDTIME
Qty: 0 | Refills: 0 | Status: DISCONTINUED | OUTPATIENT
Start: 2018-11-13 | End: 2018-11-18

## 2018-11-13 RX ORDER — INSULIN LISPRO 100/ML
VIAL (ML) SUBCUTANEOUS
Qty: 0 | Refills: 0 | Status: DISCONTINUED | OUTPATIENT
Start: 2018-11-13 | End: 2018-11-18

## 2018-11-13 RX ORDER — DEXTROSE 50 % IN WATER 50 %
25 SYRINGE (ML) INTRAVENOUS ONCE
Qty: 0 | Refills: 0 | Status: DISCONTINUED | OUTPATIENT
Start: 2018-11-13 | End: 2018-11-18

## 2018-11-13 RX ORDER — DEXTROSE 50 % IN WATER 50 %
12.5 SYRINGE (ML) INTRAVENOUS ONCE
Qty: 0 | Refills: 0 | Status: DISCONTINUED | OUTPATIENT
Start: 2018-11-13 | End: 2018-11-18

## 2018-11-13 RX ORDER — POTASSIUM CHLORIDE 20 MEQ
10 PACKET (EA) ORAL
Qty: 0 | Refills: 0 | Status: COMPLETED | OUTPATIENT
Start: 2018-11-13 | End: 2018-11-13

## 2018-11-13 RX ORDER — ACETAMINOPHEN 500 MG
650 TABLET ORAL EVERY 6 HOURS
Qty: 0 | Refills: 0 | Status: DISCONTINUED | OUTPATIENT
Start: 2018-11-13 | End: 2018-11-18

## 2018-11-13 RX ADMIN — Medication 2: at 12:28

## 2018-11-13 RX ADMIN — Medication 100 MILLIEQUIVALENT(S): at 13:07

## 2018-11-13 RX ADMIN — Medication 40 MILLIEQUIVALENT(S): at 10:17

## 2018-11-13 RX ADMIN — Medication 40 MILLIEQUIVALENT(S): at 13:08

## 2018-11-13 RX ADMIN — Medication 100 MILLIEQUIVALENT(S): at 11:37

## 2018-11-13 RX ADMIN — Medication 3: at 17:31

## 2018-11-13 RX ADMIN — LOSARTAN POTASSIUM 100 MILLIGRAM(S): 100 TABLET, FILM COATED ORAL at 10:25

## 2018-11-13 RX ADMIN — Medication 650 MILLIGRAM(S): at 17:36

## 2018-11-13 RX ADMIN — Medication 75 MILLIGRAM(S): at 10:17

## 2018-11-13 RX ADMIN — Medication 3: at 08:12

## 2018-11-13 RX ADMIN — Medication 2 MILLIGRAM(S): at 12:28

## 2018-11-13 RX ADMIN — Medication 100 MILLIEQUIVALENT(S): at 10:16

## 2018-11-13 RX ADMIN — Medication 650 MILLIGRAM(S): at 18:02

## 2018-11-13 RX ADMIN — Medication 75 MILLIGRAM(S): at 21:41

## 2018-11-13 NOTE — CONSULT NOTE ADULT - ASSESSMENT
1) Hypoxemic Respiratory Failure with Hypercapnia  2) Dyspnea  3) +Influenza B  4) Abnormal CT Chest  5) Bronchiolitis  6) Right Ventricular Enlargement  7) Enlarged Pulmonary Artery   8) Uncontrolled Diabetes   9) Electrolyte Abnormalities     59 y/o F PMHx significant for hypertension, bipolar disorder, anxiety, ADHD, and diabetes mellitus type 2, who was referred to the ED by her PCP for further evaluation of a 1 week hx of progressive generalized weakness/malaise, subjective fevers, shortness of breath/hypoxia (SaO2 81%), and associated cough.  Patient was seen emergently in the office as a STAT Pulmonary consult and patient was noted to be persistently hypoxemic without improvement in hyperventilation.   The thought was possible OHS but patient needed to be ruled out for a PE.  CT Chest reviewed, shows enlarged pulmonary artery with enlarged RV, no pulmonary embolism.  Mosaic attenuation also noted that could be suggestive of small airway disease or small vessel disease (chronic thromboembolic pulmonary hypertension).  Follow up echocardiogram  Patient has chronic hypercapnic respiratory failure, agree with BiPAP 10/5, will add nocturnal back up rate of 15.   Agree with holding diuretics, electrolyte repletion in light of abnormalities  May need a V/Q Scan to assess for chronic thromboembolic PH contingent upon echo findings  Continue Tamiflu  Will continue to monitor  Thank you for the consult

## 2018-11-13 NOTE — PATIENT PROFILE ADULT - STATED REASON FOR ADMISSION
[facility] on tuesday, on sunday I went back because I wasn't feeling better, so then I saw dr. esparza and Dr zacarias. and they told me to come to hospital

## 2018-11-13 NOTE — CONSULT NOTE ADULT - SUBJECTIVE AND OBJECTIVE BOX
Patient is a 58y old  Female who presents with a chief complaint of Cough, shortness of breath (2018 07:58)      HPI:  59 y/o F PMHx significant for hypertension, bipolar disorder, anxiety, ADHD, and diabetes mellitus type 2, who was referred to the ED by her PCP for further evaluation of a 1 week hx of progressive generalized weakness/malaise, subjective fevers, shortness of breath/hypoxia (SaO2 81%), and associated cough. The patient was initially evaluated at a local urgent care center earlier last week () and was prescribed a course of Azithromycin which she completed as prescribed without improvement prompting her second visit (11/10) to the local urgent care center where she was prescribed a 10day regimen. The patient's symptoms progressed further prompting her visit to her PCP today (). The patient was evaluated/co-managed w/ Pulmonology who referred the patient to the ED. In the ED the patient was found to be hypoxic SaO2 87%. Labs=> Na 134, K 2.5, HCO3 37, , pro-BNP 5068, ABG 7.46/50/68/90. RVP (+). CTA Chest => No pulmonary embolism. No infiltrate. Cardiomegaly. Findings suggesting pulmonary hypertension. Mosaic attenuation of the lungs suggesting air trapping. (2018 21:33)      PAST MEDICAL & SURGICAL HISTORY:  ADHD (attention deficit hyperactivity disorder)  COLLEEN (obstructive sleep apnea)  Hypertension  Diabetes mellitus: Type 2  Bipolar 1 disorder  Gastric bypass status for obesity   delivery delivered: x2      PREVIOUS CARDIAC WORKUP:      Echo:  Stress Test:  Cardiac Cath:    ALLERGIES:    No Known Allergies       MEDICATIONS  (STANDING):  azithromycin  IVPB 500 milliGRAM(s) IV Intermittent every 24 hours  cefTRIAXone Injectable. 1000 milliGRAM(s) IV Push every 24 hours  dextrose 5%. 1000 milliLiter(s) (50 mL/Hr) IV Continuous <Continuous>  dextrose 50% Injectable 12.5 Gram(s) IV Push once  dextrose 50% Injectable 25 Gram(s) IV Push once  dextrose 50% Injectable 25 Gram(s) IV Push once  insulin lispro (HumaLOG) corrective regimen sliding scale   SubCutaneous three times a day before meals  losartan 100 milliGRAM(s) Oral daily  oseltamivir 75 milliGRAM(s) Oral two times a day  potassium chloride    Tablet ER 40 milliEquivalent(s) Oral every 4 hours  potassium chloride  10 mEq/100 mL IVPB 10 milliEquivalent(s) IV Intermittent every 1 hour    MEDICATIONS  (PRN):  acetaminophen   Tablet .. 650 milliGRAM(s) Oral every 6 hours PRN Temp greater or equal to 38C (100.4F), Mild Pain (1 - 3)  ALPRAZolam 2 milliGRAM(s) Oral four times a day PRN Anxiety  benzonatate 100 milliGRAM(s) Oral three times a day PRN Cough  dextrose 40% Gel 15 Gram(s) Oral once PRN Blood Glucose LESS THAN 70 milliGRAM(s)/deciliter  docusate sodium 100 milliGRAM(s) Oral three times a day PRN Constipation  glucagon  Injectable 1 milliGRAM(s) IntraMuscular once PRN Glucose LESS THAN 70 milligrams/deciliter  ondansetron Injectable 4 milliGRAM(s) IV Push every 6 hours PRN Nausea  senna 2 Tablet(s) Oral at bedtime PRN Constipation      FAMILY HISTORY:  Family history of early CAD (Father)        SOCIAL HISTORY:  .scl     ROS:     .ros    Vital Signs Last 24 Hrs  T(C): 36.3 (2018 03:02), Max: 37.7 (2018 15:25)  T(F): 97.4 (2018 03:02), Max: 99.8 (2018 15:25)  HR: 71 (2018 03:02) (71 - 86)  BP: 107/80 (2018 03:02) (107/80 - 150/101)  BP(mean): --  RR: 22 (2018 22:48) (22 - 24)  SpO2: 100% (2018 03:02) (87% - 100%)    I&O's Summary      PHYSICAL EXAM:    .phy      TELEMETRY:    ECG:    LABS:                          11.5   3.13  )-----------( 175      ( 2018 05:54 )             36.7     1113    140  |  95<L>  |  6<L>  ----------------------------<  284<H>  2.3<LL>   |  38<H>  |  0.68    Ca    7.7<L>      2018 05:54  Phos  2.7     11-12  Mg     1.7         TPro  6.3  /  Alb  2.6<L>  /  TBili  0.6  /  DBili  x   /  AST  27  /  ALT  27  /  AlkPhos  87   @ 03:19  Trop-I  0.025  CK      --  CK-MB   --     @ 15:32  Trop-I  0.037  CK      --  CK-MB   --    Pro BNP  5068  @ 15:32  D Dimer  --  @ 15:32    PT/INR - ( 2018 15:32 )   PT: 16.9 sec;   INR: 1.50 ratio         PTT - ( 2018 15:32 )  PTT:30.2 sec    ABG - ( 2018 06:08 )  pH, Arterial: 7.40  pH, Blood: x     /  pCO2: 63    /  pO2: Unable to calc / HCO3: 39    / Base Excess: 12.0  /  SaO2: 96          RADIOLOGY & ADDITIONAL STUDIES:    CT Angio Chest PE Protocol w/ IV Cont (18 @ 18:34) >  IMPRESSION:  No pulmonary embolism. No infiltrate. Cardiomegaly. Findings suggesting pulmonary hypertension. Mosaic attenuation of the lungs suggesting air trapping. There is prominence of the main pulmonary artery measuring 3.8 cm suggesting pulmonary hypertension. Patient is a 58y old  Female who presents with a chief complaint of Cough, shortness of breath (2018 07:58)      HPI:  59 y/o F PMHx significant for hypertension, bipolar disorder, anxiety, ADHD, and diabetes mellitus type 2, who was referred to the ED by her PCP for further evaluation of a 1 week hx of progressive generalized weakness/malaise, subjective fevers, shortness of breath/hypoxia (SaO2 81%), and associated cough. The patient was initially evaluated at a local urgent care center earlier last week () and was prescribed a course of Azithromycin which she completed as prescribed without improvement prompting her second visit (11/10) to the local urgent care center where she was prescribed a 10day regimen. The patient's symptoms progressed further prompting her visit to her PCP today (). The patient was evaluated/co-managed w/ Pulmonology who referred the patient to the ED. In the ED the patient was found to be hypoxic SaO2 87%. Labs=> Na 134, K 2.5, HCO3 37, , pro-BNP 5068, ABG 7.46/50/68/90. RVP (+). CTA Chest => No pulmonary embolism. No infiltrate. Cardiomegaly. Findings suggesting pulmonary hypertension. Mosaic attenuation of the lungs suggesting air trapping. (2018 21:33)    Found to be hypoxemic at primary care MD's office. No chest pain. No orthopnea. She has moderate NICOLE.       PAST MEDICAL & SURGICAL HISTORY:  ADHD (attention deficit hyperactivity disorder)  COLLEEN (obstructive sleep apnea)  Hypertension  Diabetes mellitus: Type 2  Bipolar 1 disorder  Gastric bypass status for obesity   delivery delivered: x2      PREVIOUS CARDIAC WORKUP:  None      ALLERGIES:    No Known Allergies       MEDICATIONS  (STANDING):  azithromycin  IVPB 500 milliGRAM(s) IV Intermittent every 24 hours  cefTRIAXone Injectable. 1000 milliGRAM(s) IV Push every 24 hours  dextrose 5%. 1000 milliLiter(s) (50 mL/Hr) IV Continuous <Continuous>  dextrose 50% Injectable 12.5 Gram(s) IV Push once  dextrose 50% Injectable 25 Gram(s) IV Push once  dextrose 50% Injectable 25 Gram(s) IV Push once  insulin lispro (HumaLOG) corrective regimen sliding scale   SubCutaneous three times a day before meals  losartan 100 milliGRAM(s) Oral daily  oseltamivir 75 milliGRAM(s) Oral two times a day  potassium chloride    Tablet ER 40 milliEquivalent(s) Oral every 4 hours  potassium chloride  10 mEq/100 mL IVPB 10 milliEquivalent(s) IV Intermittent every 1 hour    MEDICATIONS  (PRN):  acetaminophen   Tablet .. 650 milliGRAM(s) Oral every 6 hours PRN Temp greater or equal to 38C (100.4F), Mild Pain (1 - 3)  ALPRAZolam 2 milliGRAM(s) Oral four times a day PRN Anxiety  benzonatate 100 milliGRAM(s) Oral three times a day PRN Cough  dextrose 40% Gel 15 Gram(s) Oral once PRN Blood Glucose LESS THAN 70 milliGRAM(s)/deciliter  docusate sodium 100 milliGRAM(s) Oral three times a day PRN Constipation  glucagon  Injectable 1 milliGRAM(s) IntraMuscular once PRN Glucose LESS THAN 70 milligrams/deciliter  ondansetron Injectable 4 milliGRAM(s) IV Push every 6 hours PRN Nausea  senna 2 Tablet(s) Oral at bedtime PRN Constipation      FAMILY HISTORY:  Family history of early CAD (Father)        SOCIAL HISTORY:  Nonsmoker. No ETOH abuse. No illicit drugs.     ROS:     Detailed ten system ROS was performed and was negative except for history as eluded to above.    no fever  no chills  no nausea  no vomiting  no diarrhea  no constipation  no melena  no hematochezia  no chest pain  no palpitations  no sob at rest  + dyspnea on exertion  + cough  no wheezing  no anorexia  no headache  no dizziness  no syncope  no weakness  no myalgia  no dysuria  no polyuria  no hematuria     Vital Signs Last 24 Hrs  T(C): 36.3 (2018 03:02), Max: 37.7 (2018 15:25)  T(F): 97.4 (2018 03:02), Max: 99.8 (2018 15:25)  HR: 71 (2018 03:02) (71 - 86)  BP: 107/80 (2018 03:02) (107/80 - 150/101)  BP(mean): --  RR: 22 (2018 22:48) (22 - 24)  SpO2: 100% (2018 03:02) (87% - 100%)    I&O's Summary      PHYSICAL EXAM:    General:                Comfortable, AAO X 3, in no distress. Obese  HEENT:                  Atraumatic, PERRLA, EOMI, conjunctiva clear.   Neck:                     Supple, no adenopathy, no thyromegaly, no JVD, no bruit.  Back:                     Symmetric, non tender.  Chest:                    Rhonchi,  B/L symmetric air entry, no tachypnea  Heart:                     S1, S2 normal, no gallop, no murmur.  Abdomen:              Soft, non-tender, bowel sounds active. No palpable masses.  Extremities:           no cyanosis, no edema. Peripheral pulses normal.  Skin:                      Skin color, texture normal. No rashes.  Neurologic:            Grossly nonfocal.       TELEMETRY:  Normal sinus rhythm with no tachy or scott events     ECG:  NSR, no ST T changes of ischemia or infarction.     LABS:                          11.5   3.13  )-----------( 175      ( 2018 05:54 )             36.7         140  |  95<L>  |  6<L>  ----------------------------<  284<H>  2.3<LL>   |  38<H>  |  0.68    Ca    7.7<L>      2018 05:54  Phos  2.7       Mg     1.7         TPro  6.3  /  Alb  2.6<L>  /  TBili  0.6  /  DBili  x   /  AST  27  /  ALT  27  /  AlkPhos  87   @ 03:19  Trop-I  0.025     @ 15:32  Trop-I  0.037    Pro BNP  5068  @ 15:32    PT/INR - ( 2018 15:32 )   PT: 16.9 sec;   INR: 1.50 ratio    PTT - ( 2018 15:32 )  PTT:30.2 sec    ABG - ( 2018 06:08 )  pH, Arterial: 7.40  pH, Blood: x     /  pCO2: 63    /  pO2: Unable to calc / HCO3: 39    / Base Excess: 12.0  /  SaO2: 96          RADIOLOGY & ADDITIONAL STUDIES:    CT Angio Chest PE Protocol w/ IV Cont (18 @ 18:34) >  IMPRESSION:  No pulmonary embolism. No infiltrate. Cardiomegaly. Findings suggesting pulmonary hypertension. Mosaic attenuation of the lungs suggesting air trapping. There is prominence of the main pulmonary artery measuring 3.8 cm suggesting pulmonary hypertension.

## 2018-11-13 NOTE — CONSULT NOTE ADULT - SUBJECTIVE AND OBJECTIVE BOX
Patient is a 58y old  Female who presents with a chief complaint of Cough, shortness of breath     HPI:  57 y/o Female with h/o hypertension, bipolar disorder, anxiety, ADHD, and diabetes mellitus type 2 was admitted on  for progressive generalized weakness/malaise, subjective fevers, shortness of breath/hypoxia (SaO2 81%), and associated cough. The patient was initially evaluated at a local urgent care center earlier last week () and was prescribed  Azithromycin PO which she completed as prescribed without improvement prompting her second visit (11/10) to the local urgent care center where she was prescribed further abx. The patient's symptoms progressed further prompting her visit to her PCP on  who referred the patient to the ED. In the ED the patient was found with low grade fever and hypoxia SaO2 87%.       PMH: as above  PSH: as above  Meds: per reconciliation sheet, noted below  MEDICATIONS  (STANDING):  dextrose 5%. 1000 milliLiter(s) (50 mL/Hr) IV Continuous <Continuous>  dextrose 50% Injectable 12.5 Gram(s) IV Push once  dextrose 50% Injectable 25 Gram(s) IV Push once  dextrose 50% Injectable 25 Gram(s) IV Push once  insulin lispro (HumaLOG) corrective regimen sliding scale   SubCutaneous three times a day before meals  losartan 100 milliGRAM(s) Oral daily  oseltamivir 75 milliGRAM(s) Oral two times a day  potassium chloride    Tablet ER 40 milliEquivalent(s) Oral every 4 hours  potassium chloride  10 mEq/100 mL IVPB 10 milliEquivalent(s) IV Intermittent every 1 hour    MEDICATIONS  (PRN):  acetaminophen   Tablet .. 650 milliGRAM(s) Oral every 6 hours PRN Temp greater or equal to 38C (100.4F), Mild Pain (1 - 3)  ALPRAZolam 2 milliGRAM(s) Oral four times a day PRN Anxiety  benzonatate 100 milliGRAM(s) Oral three times a day PRN Cough  dextrose 40% Gel 15 Gram(s) Oral once PRN Blood Glucose LESS THAN 70 milliGRAM(s)/deciliter  docusate sodium 100 milliGRAM(s) Oral three times a day PRN Constipation  glucagon  Injectable 1 milliGRAM(s) IntraMuscular once PRN Glucose LESS THAN 70 milligrams/deciliter  ondansetron Injectable 4 milliGRAM(s) IV Push every 6 hours PRN Nausea  senna 2 Tablet(s) Oral at bedtime PRN Constipation    Allergies    No Known Allergies    Intolerances      Social: no smoking, no alcohol, no illegal drugs; no recent travel, no exposure to TB  FAMILY HISTORY:  Family history of early CAD (Father)    no history of premature cardiovascular disease in first degree relatives    ROS: the patient denies HA, no seizures, no dizziness, no sore throat, no nasal congestion, no blurry vision, no CP, no palpitations, has SOB, has cough, no abdominal pain, no diarrhea, no N/V, no dysuria, no leg pain, no claudication, no rash, no joint aches, no rectal pain or bleeding, no night sweats  All other systems reviewed and are negative    Vital Signs Last 24 Hrs  T(C): 37.2 (2018 11:55), Max: 37.7 (2018 15:25)  T(F): 99 (2018 11:55), Max: 99.8 (2018 15:25)  HR: 83 (2018 11:55) (71 - 86)  BP: 145/98 (2018 11:55) (107/80 - 150/101)  BP(mean): --  RR: 18 (2018 11:55) (18 - 24)  SpO2: 97% (2018 11:55) (87% - 100%)  Daily     Daily Weight in k.2 (2018 09:02)    PE:    Constitutional: frail looking  HEENT: NC/AT, EOMI, PERRLA, conjunctivae clear; ears and nose atraumatic; pharynx benign  Neck: supple; thyroid not palpable  Back: no tenderness  Respiratory: respiratory effort normal; crackles at bases  Cardiovascular: S1S2 regular, no murmurs  Abdomen: soft, not tender, not distended, positive BS; no liver or spleen organomegaly  Genitourinary: no suprapubic tenderness  Lymphatic: no LN palpable  Musculoskeletal: no muscle tenderness, no joint swelling or tenderness  Extremities: no pedal edema  Neurological/ Psychiatric: AxOx3, judgement and insight normal; moving all extremities  Skin: no rashes; no palpable lesions    Labs: all available labs reviewed                        11.5   3.13  )-----------( 175      ( 2018 05:54 )             36.7     11-13    140  |  95<L>  |  6<L>  ----------------------------<  284<H>  2.3<LL>   |  38<H>  |  0.68    Ca    7.7<L>      2018 05:54  Phos  2.7       Mg     1.7         TPro  6.3  /  Alb  2.6<L>  /  TBili  0.6  /  DBili  x   /  AST  27  /  ALT  27  /  AlkPhos  87       LIVER FUNCTIONS - ( 2018 05:54 )  Alb: 2.6 g/dL / Pro: 6.3 gm/dL / ALK PHOS: 87 U/L / ALT: 27 U/L / AST: 27 U/L / GGT: x           Urinalysis Basic - ( 2018 17:16 )    Color: Yellow / Appearance: Clear / S.005 / pH: x  Gluc: x / Ketone: Negative  / Bili: Negative / Urobili: Negative mg/dL   Blood: x / Protein: Negative mg/dL / Nitrite: Negative   Leuk Esterase: Negative / RBC: x / WBC x   Sq Epi: x / Non Sq Epi: x / Bacteria: x          Radiology: all available radiological tests reviewed    < from: CT Angio Chest PE Protocol w/ IV Cont (18 @ 18:34) >  No pulmonary embolism. No infiltrate.  Cardiomegaly. Findings suggesting pulmonary hypertension.  Mosaic attenuation of the lungs suggesting air trapping.    < end of copied text >      Advanced directives addressed: full resuscitation

## 2018-11-13 NOTE — CONSULT NOTE ADULT - SUBJECTIVE AND OBJECTIVE BOX
Patient is a 58y old  Female who presents with a chief complaint of Cough, shortness of breath (2018 21:33)      HPI:  59 y/o F PMHx significant for hypertension, bipolar disorder, anxiety, ADHD, and diabetes mellitus type 2, who was referred to the ED by her PCP for further evaluation of a 1 week hx of progressive generalized weakness/malaise, subjective fevers, shortness of breath/hypoxia (SaO2 81%), and associated cough. The patient was initially evaluated at a local urgent care center earlier last week () and was prescribed a course of Azithromycin which she completed as prescribed without improvement prompting her second visit (11/10) to the local urgent care center where she was prescribed a 10day regimen. The patient's symptoms progressed further prompting her visit to her PCP today (). The patient was evaluated/co-managed w/ Pulmonology who referred the patient to the ED. In the ED the patient was found to be hypoxic SaO2 87%. Labs=> Na 134, K 2.5, HCO3 37, , pro-BNP 5068, ABG 7.46/50/68/90. RVP (+). CTA Chest => No pulmonary embolism. No infiltrate. Cardiomegaly. Findings suggesting pulmonary hypertension. Mosaic attenuation of the lungs suggesting air trapping. (2018 21:33)      PAST MEDICAL & SURGICAL HISTORY:  ADHD (attention deficit hyperactivity disorder)  COLLEEN (obstructive sleep apnea)  Hypertension  Diabetes mellitus: Type 2  Bipolar 1 disorder  Gastric bypass status for obesity   delivery delivered: x2      PREVIOUS DIAGNOSTIC TESTING:      MEDICATIONS  (STANDING):  azithromycin  IVPB 500 milliGRAM(s) IV Intermittent every 24 hours  cefTRIAXone Injectable. 1000 milliGRAM(s) IV Push every 24 hours  dextrose 5%. 1000 milliLiter(s) (50 mL/Hr) IV Continuous <Continuous>  dextrose 50% Injectable 12.5 Gram(s) IV Push once  dextrose 50% Injectable 25 Gram(s) IV Push once  dextrose 50% Injectable 25 Gram(s) IV Push once  insulin lispro (HumaLOG) corrective regimen sliding scale   SubCutaneous three times a day before meals  losartan 100 milliGRAM(s) Oral daily  oseltamivir 75 milliGRAM(s) Oral two times a day  potassium chloride    Tablet ER 40 milliEquivalent(s) Oral every 4 hours  potassium chloride  10 mEq/100 mL IVPB 10 milliEquivalent(s) IV Intermittent every 1 hour    MEDICATIONS  (PRN):  acetaminophen   Tablet .. 650 milliGRAM(s) Oral every 6 hours PRN Temp greater or equal to 38C (100.4F), Mild Pain (1 - 3)  ALPRAZolam 2 milliGRAM(s) Oral four times a day PRN Anxiety  benzonatate 100 milliGRAM(s) Oral three times a day PRN Cough  dextrose 40% Gel 15 Gram(s) Oral once PRN Blood Glucose LESS THAN 70 milliGRAM(s)/deciliter  docusate sodium 100 milliGRAM(s) Oral three times a day PRN Constipation  glucagon  Injectable 1 milliGRAM(s) IntraMuscular once PRN Glucose LESS THAN 70 milligrams/deciliter  ondansetron Injectable 4 milliGRAM(s) IV Push every 6 hours PRN Nausea  senna 2 Tablet(s) Oral at bedtime PRN Constipation      FAMILY HISTORY:  Family history of early CAD (Father)      SOCIAL HISTORY: lives ath ome     REVIEW OF SYSTEM:  NICOLE, cough, chest tightness, otherwise 12 point ROS negative     Vital Signs Last 24 Hrs  T(C): 36.3 (2018 03:02), Max: 37.7 (2018 15:25)  T(F): 97.4 (2018 03:02), Max: 99.8 (2018 15:25)  HR: 71 (2018 03:02) (71 - 86)  BP: 107/80 (2018 03:02) (107/80 - 150/101)  BP(mean): --  RR: 22 (2018 22:48) (22 - 24)  SpO2: 100% (2018 03:02) (87% - 100%)    I&O's Summary    PHYSICAL EXAM  General Appearance: cooperative, no acute distress,   HEENT: PERRL, conjunctiva clear, EOM's intact, non injected pharynx, no exudate, TM   normal  Neck: Supple, , no adenopathy, thyroid: not enlarged, no carotid bruit or JVD  Back: Symmetric, no  tenderness,no soft tissue tenderness  Lungs: Clear to auscultation bilateral,no adventitious breath sounds, normal   expiratory phase  Heart: Regular rate and rhythm, S1, S2 normal, no murmur, rub or gallop  Abdomen: Soft, non-tender, bowel sounds active , no hepatosplenomegaly  Extremities: no cyanosis or edema, no joint swelling  Skin: Skin color, texture normal, no rashes   Neurologic: Alert and oriented X3 , cranial nerves intact, sensory and motor normal,    ECG:    LABS:                          11.5   3.13  )-----------( 175      ( 2018 05:54 )             36.7     13    140  |  95<L>  |  6<L>  ----------------------------<  284<H>  2.3<LL>   |  38<H>  |  0.68    Ca    7.7<L>      2018 05:54  Phos  2.7       Mg     1.7         TPro  6.3  /  Alb  2.6<L>  /  TBili  0.6  /  DBili  x   /  AST  27  /  ALT  27  /  AlkPhos  87      CARDIAC MARKERS ( 2018 03:19 )  0.025 ng/mL / x     / x     / x     / x      CARDIAC MARKERS ( 2018 15:32 )  0.037 ng/mL / x     / x     / x     / x            Pro BNP  5068  @ 15:32  D Dimer  --  @ 15:32    PT/INR - ( 2018 15:32 )   PT: 16.9 sec;   INR: 1.50 ratio         PTT - ( 2018 15:32 )  PTT:30.2 sec  Urinalysis Basic - ( 2018 17:16 )    Color: Yellow / Appearance: Clear / S.005 / pH: x  Gluc: x / Ketone: Negative  / Bili: Negative / Urobili: Negative mg/dL   Blood: x / Protein: Negative mg/dL / Nitrite: Negative   Leuk Esterase: Negative / RBC: x / WBC x   Sq Epi: x / Non Sq Epi: x / Bacteria: x      ABG - ( 2018 06:08 )  pH, Arterial: 7.40  pH, Blood: x     /  pCO2: 63    /  pO2: Unable to calc Instrument unable to calculate PO2A.  A-Gas Repeated on both instruments and also repeated using the capillary  tube. Confirmed there were no air bubbles and mixed the specimen well  before running on the instrument.  Informed ASHKAN Hoang in 3E.  18 06:31 / HCO3: 39    / Base Excess: 12.0  /  SaO2: 96                    RADIOLOGY & ADDITIONAL STUDIES:

## 2018-11-13 NOTE — PROGRESS NOTE ADULT - SUBJECTIVE AND OBJECTIVE BOX
59 y/o F PMHx significant for hypertension, bipolar disorder, anxiety, ADHD, and diabetes mellitus type 2, who was referred to the ED by her PCP for further evaluation of a 1 week hx of progressive generalized weakness/malaise, subjective fevers, shortness of breath/hypoxia (SaO2 81%), and associated cough. The patient was initially evaluated at a local urgent care center earlier last week () and was prescribed a course of Azithromycin which she completed as prescribed without improvement prompting her second visit (11/10) to the local urgent care center where she was prescribed a 10day regimen. The patient's symptoms progressed further prompting her visit to her PCP today (). The patient was evaluated/co-managed w/ Pulmonology who referred the patient to the ED. In the ED the patient was found to be hypoxic SaO2 87%. Labs=> Na 134, K 2.5, HCO3 37, , pro-BNP 5068, ABG 7.46/50/68/90. RVP (+). CTA Chest => No pulmonary embolism. No infiltrate. Cardiomegaly. Findings suggesting pulmonary hypertension. Mosaic attenuation of the lungs suggesting air trapping.    : Pt breathing improving, weaned to nasal cannula. CT without consolidations.  Will continue supportive care for acute influenza B.     REVIEW OF SYSTEMS: All other review of systems is negative unless indicated above.    Vital Signs Last 24 Hrs  T(C): 37.2 (2018 11:55), Max: 37.3 (2018 22:48)  T(F): 99 (2018 11:55), Max: 99.1 (2018 22:48)  HR: 83 (2018 11:55) (71 - 86)  BP: 145/98 (2018 11:55) (107/80 - 145/98)  BP(mean): --  RR: 18 (2018 11:55) (18 - 22)  SpO2: 97% (2018 11:55) (96% - 100%)    PHYSICAL EXAM:    Constitutional: NAD, ill appearing, obese  HEENT: PERR, EOMI, Normal Hearing, MMM  Neck: Soft and supple  Respiratory: Breath sounds diminished  Cardiovascular: S1 and S2, regular rate and rhythm, no Murmurs, gallops or rubs  Gastrointestinal: Bowel Sounds present, soft, nontender, nondistended, no guarding, no rebound  Extremities: No peripheral edema  Neurological: A/O x 3, no focal deficits in my limited exam  Skin: No rashes      MEDICATIONS  (STANDING):  dextrose 5%. 1000 milliLiter(s) (50 mL/Hr) IV Continuous <Continuous>  dextrose 50% Injectable 12.5 Gram(s) IV Push once  dextrose 50% Injectable 25 Gram(s) IV Push once  dextrose 50% Injectable 25 Gram(s) IV Push once  insulin lispro (HumaLOG) corrective regimen sliding scale   SubCutaneous three times a day before meals  losartan 100 milliGRAM(s) Oral daily  oseltamivir 75 milliGRAM(s) Oral two times a day    MEDICATIONS  (PRN):  acetaminophen   Tablet .. 650 milliGRAM(s) Oral every 6 hours PRN Temp greater or equal to 38C (100.4F), Mild Pain (1 - 3)  ALPRAZolam 2 milliGRAM(s) Oral four times a day PRN Anxiety  benzonatate 100 milliGRAM(s) Oral three times a day PRN Cough  dextrose 40% Gel 15 Gram(s) Oral once PRN Blood Glucose LESS THAN 70 milliGRAM(s)/deciliter  docusate sodium 100 milliGRAM(s) Oral three times a day PRN Constipation  glucagon  Injectable 1 milliGRAM(s) IntraMuscular once PRN Glucose LESS THAN 70 milligrams/deciliter  ondansetron Injectable 4 milliGRAM(s) IV Push every 6 hours PRN Nausea  senna 2 Tablet(s) Oral at bedtime PRN Constipation    CARDIAC MARKERS ( 2018 03:19 )  0.025 ng/mL / x     / x     / x     / x      CARDIAC MARKERS ( 2018 15:32 )  0.037 ng/mL / x     / x     / x     / x                                11.5   3.13  )-----------( 175      ( 2018 05:54 )             36.7     11-13    140  |  95<L>  |  6<L>  ----------------------------<  284<H>  2.3<LL>   |  38<H>  |  0.68    Ca    7.7<L>      2018 05:54  Phos  2.7     11-12  Mg     1.7     11-13    TPro  6.3  /  Alb  2.6<L>  /  TBili  0.6  /  DBili  x   /  AST  27  /  ALT  27  /  AlkPhos  87      CAPILLARY BLOOD GLUCOSE      POCT Blood Glucose.: 201 mg/dL (2018 12:15)  POCT Blood Glucose.: 281 mg/dL (2018 08:06)  POCT Blood Glucose.: 313 mg/dL (2018 02:35)    LIVER FUNCTIONS - ( 2018 05:54 )  Alb: 2.6 g/dL / Pro: 6.3 gm/dL / ALK PHOS: 87 U/L / ALT: 27 U/L / AST: 27 U/L / GGT: x           PT/INR - ( 2018 15:32 )   PT: 16.9 sec;   INR: 1.50 ratio         PTT - ( 2018 15:32 )  PTT:30.2 sec  Urinalysis Basic - ( 2018 17:16 )    Color: Yellow / Appearance: Clear / S.005 / pH: x  Gluc: x / Ketone: Negative  / Bili: Negative / Urobili: Negative mg/dL   Blood: x / Protein: Negative mg/dL / Nitrite: Negative   Leuk Esterase: Negative / RBC: x / WBC x   Sq Epi: x / Non Sq Epi: x / Bacteria: x      ABG - ( 2018 06:08 )  pH, Arterial: see note pH, Blood: x     /  pCO2: see note /  pO2: see note / HCO3: see note / Base Excess: see note /  SaO2: see note       Assessment and Plan:  59 y/o F PMHx significant for hypertension, bipolar disorder, anxiety, ADHD, and diabetes mellitus type 2, who was referred to the ED by her PCP for further evaluation of a 1 week hx of progressive generalized weakness/malaise, subjective fevers, shortness of breath/hypoxia (SaO2 81%), and associated cough, found to have acute respiratory failure secondary to acute influenza B infection.    Sepsis with Acute Influenza B complicated by Acute Hypoxic Respiratory Failure:  -monitor tele  -f/u PAN C+S  -weaned off bipap now on nasal canula  -repeat ABG  improved  -f/u w/ Pulmonology in the am  -cont. Oseltamivir  -CT chest without consolidations, or thrombosis  --> d/c IV abx.     Pulmonary hypertension/cardiomegaly:  -Elevated BNP  -echo  -trops neg x 2  -cardio eval appreciated --> Ischemic evaluation with stress when influenza and respiratory status improves     Hypertension  -cont. Losartan 100mg po daily    Bipolar disorder/Anxiety/Depression  -Pt on multiple psychiatric meds including Aripiprazole 15mg po daily, Risperidone 2mg po bid, Olanzapine 5mg po qHS, Effexor XR 75mg 3tabs po daily, Citalopram 20mg po bid, and Xanax 2mg po qid - will need psychiatric evaluation for psych med management.  In meantime will change xanax to PRN.    Diabetes mellitus type 2  -FS qAC/HS  -A1c 9.2, likely psych meds contributing to hyperglycemia   -cont. ISS per protocol    Hypokalemia  -supplement and monitor    obesity s/p gastric bypass    Vte ppx  ~cont. SCDs for now

## 2018-11-13 NOTE — CONSULT NOTE ADULT - ASSESSMENT
Hypopxemia  Hypokalemia  Influenza  HTN  DM  Possible PAH, sec to COLLEEN    Suggest:    Continue current care and pulm treatment.   Echo to eval LV and RV function and size. Eval valves  Supplement K  Continue current cardiac treatment.   Eventually will get ischemia evaluation with stress when influenza and respiratory status improves - as out pt. Hypopxemia  Hypokalemia  Influenza  r/o CHF  HTN  DM  Possible PAH, sec to COLLEEN    Suggest:    Continue current care and pulm treatment.   Echo to eval LV and RV function and size. Eval valves  Supplement K  Continue current cardiac treatment.   BNP is high, can be sec to COLLEEN, PAH. May use K sparing diuretic.  Eventually will get ischemia evaluation with stress when influenza and respiratory status improves - as out pt.   D/W Dr Lopez

## 2018-11-13 NOTE — CONSULT NOTE ADULT - ASSESSMENT
59 y/o Female with h/o hypertension, bipolar disorder, anxiety, ADHD, and diabetes mellitus type 2 was admitted on 11/12 for progressive generalized weakness/malaise, subjective fevers, shortness of breath/hypoxia (SaO2 81%), and associated cough. The patient was initially evaluated at a local urgent care center earlier last week (11/6) and was prescribed  Azithromycin PO which she completed as prescribed without improvement prompting her second visit (11/10) to the local urgent care center where she was prescribed further abx. The patient's symptoms progressed further prompting her visit to her PCP on 11/12 who referred the patient to the ED. In the ED the patient was found with low grade fever and hypoxia SaO2 87%.    1. Low grade fever. Influenza B.   -no pulmonary infiltrates noted on CT chest  -hydration  -O2 therapy  -agree with oseltamivir 50 mg PO q12h  -reason for antiviral use and side effects reviewed with patient; monitor BMP  -droplet isolation  -old chart reviewed to assess prior cultures  -monitor temps  -f/u CBC  -supportive care  2. Other issues:   -care per medicine 59 y/o Female with h/o hypertension, bipolar disorder, anxiety, ADHD, and diabetes mellitus type 2 was admitted on 11/12 for progressive generalized weakness/malaise, subjective fevers, shortness of breath/hypoxia (SaO2 81%), and associated cough. The patient was initially evaluated at a local urgent care center earlier last week (11/6) and was prescribed  Azithromycin PO which she completed as prescribed without improvement prompting her second visit (11/10) to the local urgent care center where she was prescribed further abx. The patient's symptoms progressed further prompting her visit to her PCP on 11/12 who referred the patient to the ED. In the ED the patient was found with low grade fever and hypoxia SaO2 87%.    1. Low grade fever. Influenza B. Acute hypoxic respiratory failure. Obesity.  -no pulmonary infiltrates noted on CT chest  -hydration  -O2 therapy  -agree with oseltamivir 50 mg PO q12h  -reason for antiviral use and side effects reviewed with patient; monitor BMP  -droplet isolation  -old chart reviewed to assess prior cultures  -monitor temps  -f/u CBC  -supportive care  2. Other issues:   -care per medicine 57 y/o Female with h/o hypertension, bipolar disorder, anxiety, ADHD, and diabetes mellitus type 2 was admitted on 11/12 for progressive generalized weakness/malaise, subjective fevers, shortness of breath/hypoxia (SaO2 81%), and associated cough. The patient was initially evaluated at a local urgent care center earlier last week (11/6) and was prescribed  Azithromycin PO which she completed as prescribed without improvement prompting her second visit (11/10) to the local urgent care center where she was prescribed further abx. The patient's symptoms progressed further prompting her visit to her PCP on 11/12 who referred the patient to the ED. In the ED the patient was found with low grade fever and hypoxia SaO2 87%.    1. Low grade fever. Influenza B. Acute hypoxic respiratory failure. Obesity.  -no pulmonary infiltrates noted on CT chest  -hydration  -O2 therapy  -agree with oseltamivir 75 mg PO q12h  -reason for antiviral use and side effects reviewed with patient; monitor BMP  -droplet isolation  -old chart reviewed to assess prior cultures  -monitor temps  -f/u CBC  -supportive care  2. Other issues:   -care per medicine

## 2018-11-14 LAB
ANION GAP SERPL CALC-SCNC: 7 MMOL/L — SIGNIFICANT CHANGE UP (ref 5–17)
BUN SERPL-MCNC: 7 MG/DL — SIGNIFICANT CHANGE UP (ref 7–23)
CALCIUM SERPL-MCNC: 7.7 MG/DL — LOW (ref 8.5–10.1)
CHLORIDE SERPL-SCNC: 98 MMOL/L — SIGNIFICANT CHANGE UP (ref 96–108)
CO2 SERPL-SCNC: 35 MMOL/L — HIGH (ref 22–31)
CREAT SERPL-MCNC: 0.67 MG/DL — SIGNIFICANT CHANGE UP (ref 0.5–1.3)
GLUCOSE BLDC GLUCOMTR-MCNC: 292 MG/DL — HIGH (ref 70–99)
GLUCOSE BLDC GLUCOMTR-MCNC: 303 MG/DL — HIGH (ref 70–99)
GLUCOSE BLDC GLUCOMTR-MCNC: 345 MG/DL — HIGH (ref 70–99)
GLUCOSE SERPL-MCNC: 266 MG/DL — HIGH (ref 70–99)
HCT VFR BLD CALC: 38.8 % — SIGNIFICANT CHANGE UP (ref 34.5–45)
HGB BLD-MCNC: 11.8 G/DL — SIGNIFICANT CHANGE UP (ref 11.5–15.5)
MCHC RBC-ENTMCNC: 28.3 PG — SIGNIFICANT CHANGE UP (ref 27–34)
MCHC RBC-ENTMCNC: 30.4 GM/DL — LOW (ref 32–36)
MCV RBC AUTO: 93 FL — SIGNIFICANT CHANGE UP (ref 80–100)
NRBC # BLD: 0 /100 WBCS — SIGNIFICANT CHANGE UP (ref 0–0)
NT-PROBNP SERPL-SCNC: 986 PG/ML — HIGH (ref 0–125)
PLATELET # BLD AUTO: 167 K/UL — SIGNIFICANT CHANGE UP (ref 150–400)
POTASSIUM SERPL-MCNC: 3 MMOL/L — LOW (ref 3.5–5.3)
POTASSIUM SERPL-SCNC: 3 MMOL/L — LOW (ref 3.5–5.3)
RBC # BLD: 4.17 M/UL — SIGNIFICANT CHANGE UP (ref 3.8–5.2)
RBC # FLD: 15.4 % — HIGH (ref 10.3–14.5)
SODIUM SERPL-SCNC: 140 MMOL/L — SIGNIFICANT CHANGE UP (ref 135–145)
WBC # BLD: 3.12 K/UL — LOW (ref 3.8–10.5)
WBC # FLD AUTO: 3.12 K/UL — LOW (ref 3.8–10.5)

## 2018-11-14 PROCEDURE — 71045 X-RAY EXAM CHEST 1 VIEW: CPT | Mod: 26

## 2018-11-14 PROCEDURE — 78582 LUNG VENTILAT&PERFUS IMAGING: CPT | Mod: 26

## 2018-11-14 RX ORDER — CITALOPRAM 10 MG/1
20 TABLET, FILM COATED ORAL DAILY
Qty: 0 | Refills: 0 | Status: DISCONTINUED | OUTPATIENT
Start: 2018-11-14 | End: 2018-11-14

## 2018-11-14 RX ORDER — VENLAFAXINE HCL 75 MG
225 CAPSULE, EXT RELEASE 24 HR ORAL DAILY
Qty: 0 | Refills: 0 | Status: DISCONTINUED | OUTPATIENT
Start: 2018-11-14 | End: 2018-11-14

## 2018-11-14 RX ORDER — CITALOPRAM 10 MG/1
20 TABLET, FILM COATED ORAL AT BEDTIME
Qty: 0 | Refills: 0 | Status: DISCONTINUED | OUTPATIENT
Start: 2018-11-14 | End: 2018-11-14

## 2018-11-14 RX ORDER — RISPERIDONE 4 MG/1
2 TABLET ORAL
Qty: 0 | Refills: 0 | Status: DISCONTINUED | OUTPATIENT
Start: 2018-11-14 | End: 2018-11-14

## 2018-11-14 RX ORDER — POTASSIUM CHLORIDE 20 MEQ
40 PACKET (EA) ORAL EVERY 4 HOURS
Qty: 0 | Refills: 0 | Status: COMPLETED | OUTPATIENT
Start: 2018-11-14 | End: 2018-11-14

## 2018-11-14 RX ORDER — ARIPIPRAZOLE 15 MG/1
15 TABLET ORAL DAILY
Qty: 0 | Refills: 0 | Status: DISCONTINUED | OUTPATIENT
Start: 2018-11-14 | End: 2018-11-14

## 2018-11-14 RX ORDER — ARIPIPRAZOLE 15 MG/1
15 TABLET ORAL DAILY
Qty: 0 | Refills: 0 | Status: DISCONTINUED | OUTPATIENT
Start: 2018-11-14 | End: 2018-11-18

## 2018-11-14 RX ORDER — OLANZAPINE 15 MG/1
5 TABLET, FILM COATED ORAL AT BEDTIME
Qty: 0 | Refills: 0 | Status: DISCONTINUED | OUTPATIENT
Start: 2018-11-14 | End: 2018-11-14

## 2018-11-14 RX ORDER — CITALOPRAM 10 MG/1
40 TABLET, FILM COATED ORAL DAILY
Qty: 0 | Refills: 0 | Status: DISCONTINUED | OUTPATIENT
Start: 2018-11-14 | End: 2018-11-18

## 2018-11-14 RX ADMIN — Medication 2 MILLIGRAM(S): at 16:17

## 2018-11-14 RX ADMIN — Medication 4: at 12:33

## 2018-11-14 RX ADMIN — LOSARTAN POTASSIUM 100 MILLIGRAM(S): 100 TABLET, FILM COATED ORAL at 05:39

## 2018-11-14 RX ADMIN — Medication 2 MILLIGRAM(S): at 09:12

## 2018-11-14 RX ADMIN — ARIPIPRAZOLE 15 MILLIGRAM(S): 15 TABLET ORAL at 16:17

## 2018-11-14 RX ADMIN — Medication 75 MILLIGRAM(S): at 17:19

## 2018-11-14 RX ADMIN — Medication 40 MILLIEQUIVALENT(S): at 14:19

## 2018-11-14 RX ADMIN — Medication 40 MILLIEQUIVALENT(S): at 11:21

## 2018-11-14 RX ADMIN — Medication 75 MILLIGRAM(S): at 05:39

## 2018-11-14 RX ADMIN — Medication 3: at 17:19

## 2018-11-14 RX ADMIN — Medication 4: at 08:24

## 2018-11-14 RX ADMIN — CITALOPRAM 40 MILLIGRAM(S): 10 TABLET, FILM COATED ORAL at 16:16

## 2018-11-14 NOTE — PROGRESS NOTE ADULT - ASSESSMENT
1) Hypoxemic Respiratory Failure with Hypercapnia  2) Dyspnea  3) +Influenza B  4) Abnormal CT Chest  5) Bronchiolitis  6) Right Ventricular Enlargement  7) Enlarged Pulmonary Artery   8) Uncontrolled Diabetes   9) Electrolyte Abnormalities   10) Pulmonary Hypertension    59 y/o F PMHx significant for hypertension, bipolar disorder, anxiety, ADHD, and diabetes mellitus type 2, who was referred to the ED by her PCP for further evaluation of a 1 week hx of progressive generalized weakness/malaise, subjective fevers, shortness of breath/hypoxia (SaO2 81%), and associated cough.  Patient was seen emergently in the office as a STAT Pulmonary consult and patient was noted to be persistently hypoxemic without improvement in hyperventilation.   The thought was possible OHS but patient needed to be ruled out for a PE.  CT Chest reviewed, shows enlarged pulmonary artery with enlarged RV, no pulmonary embolism.  Mosaic attenuation also noted that could be suggestive of small airway disease or small vessel disease (chronic thromboembolic pulmonary hypertension).  Patient has chronic hypercapnic respiratory failure, agree with BiPAP 10/5, will add nocturnal back up rate of 15.   Agree with holding diuretics, electrolyte repletion in light of abnormalities  Echocardiogram reviewed and shows findings suggestive of right ventricular overload/pulmonary hypertension  Will order V/Q Scan for today (CTPE may not  small vessel chronic embolic changes and if this shows moderate/high probability, it would be an indication to start anticoagulation and consider CTEPH as a diagnosis)  Appreciate Cardiology recommendations   Continue Tamiflu  Will continue to monitor  Thank you for the consult

## 2018-11-14 NOTE — PROGRESS NOTE ADULT - SUBJECTIVE AND OBJECTIVE BOX
57 y/o F PMHx significant for hypertension, bipolar disorder, anxiety, ADHD, and diabetes mellitus type 2, who was referred to the ED by her PCP for further evaluation of a 1 week hx of progressive generalized weakness/malaise, subjective fevers, shortness of breath/hypoxia (SaO2 81%), and associated cough. The patient was initially evaluated at a local urgent care center earlier last week () and was prescribed a course of Azithromycin which she completed as prescribed without improvement prompting her second visit (11/10) to the local urgent care center where she was prescribed a 10day regimen. The patient's symptoms progressed further prompting her visit to her PCP today (). The patient was evaluated/co-managed w/ Pulmonology who referred the patient to the ED. In the ED the patient was found to be hypoxic SaO2 87%. Labs=> Na 134, K 2.5, HCO3 37, , pro-BNP 5068, ABG 7.46/50/68/90. RVP (+). CTA Chest => No pulmonary embolism. No infiltrate. Cardiomegaly. Findings suggesting pulmonary hypertension. Mosaic attenuation of the lungs suggesting air trapping.    : Pt breathing improving, weaned to nasal cannula. CT without consolidations.  Will continue supportive care for acute influenza B.     : Pt feeling better, breathing improved.  Spoke to her about her psych meds, states she receives them from her psychiatrist.  She declined to meet with our inpatient psychiatrist for med management.  Pt currently being treated for flu.  I also spoke to her  via phone for verification of her meds.  He states he does not really know her meds but is concerned for her polypharmacy.    REVIEW OF SYSTEMS: All other review of systems is negative unless indicated above.    Vital Signs Last 24 Hrs  T(C): 37 (2018 10:14), Max: 37 (2018 10:14)  T(F): 98.6 (2018 10:14), Max: 98.6 (2018 10:14)  HR: 70 (2018 10:14) (70 - 86)  BP: 110/83 (2018 10:14) (110/83 - 148/92)  BP(mean): --  RR: 18 (2018 10:14) (18 - 18)  SpO2: 97% (2018 10:14) (93% - 100%)    PHYSICAL EXAM:    Constitutional: NAD, obese  HEENT: PERR, EOMI, Normal Hearing, MMM  Neck: Soft and supple  Respiratory: Breath sounds diminished  Cardiovascular: S1 and S2, regular rate and rhythm, no Murmurs, gallops or rubs  Gastrointestinal: Bowel Sounds present, soft, nontender, nondistended, no guarding, no rebound  Extremities: No peripheral edema  Neurological: A/O x 3, no focal deficits in my limited exam  Skin: No rashes    MEDICATIONS  (STANDING):  ARIPiprazole 15 milliGRAM(s) Oral daily  citalopram 40 milliGRAM(s) Oral daily  dextrose 5%. 1000 milliLiter(s) (50 mL/Hr) IV Continuous <Continuous>  dextrose 50% Injectable 12.5 Gram(s) IV Push once  dextrose 50% Injectable 25 Gram(s) IV Push once  dextrose 50% Injectable 25 Gram(s) IV Push once  insulin lispro (HumaLOG) corrective regimen sliding scale   SubCutaneous three times a day before meals  losartan 100 milliGRAM(s) Oral daily  oseltamivir 75 milliGRAM(s) Oral two times a day  potassium chloride    Tablet ER 40 milliEquivalent(s) Oral every 4 hours    MEDICATIONS  (PRN):  acetaminophen   Tablet .. 650 milliGRAM(s) Oral every 6 hours PRN Temp greater or equal to 38C (100.4F), Mild Pain (1 - 3)  ALPRAZolam 2 milliGRAM(s) Oral four times a day PRN Anxiety  benzonatate 100 milliGRAM(s) Oral three times a day PRN Cough  dextrose 40% Gel 15 Gram(s) Oral once PRN Blood Glucose LESS THAN 70 milliGRAM(s)/deciliter  docusate sodium 100 milliGRAM(s) Oral three times a day PRN Constipation  glucagon  Injectable 1 milliGRAM(s) IntraMuscular once PRN Glucose LESS THAN 70 milligrams/deciliter  ondansetron Injectable 4 milliGRAM(s) IV Push every 6 hours PRN Nausea  senna 2 Tablet(s) Oral at bedtime PRN Constipation    CARDIAC MARKERS ( 2018 03:19 )  0.025 ng/mL / x     / x     / x     / x      CARDIAC MARKERS ( 2018 15:32 )  0.037 ng/mL / x     / x     / x     / x                                11.8   3.12  )-----------( 167      ( 2018 05:49 )             38.8     -14    140  |  98  |  7   ----------------------------<  266<H>  3.0<L>   |  35<H>  |  0.67    Ca    7.7<L>      2018 05:49  Phos  2.7       Mg     1.7         TPro  6.3  /  Alb  2.6<L>  /  TBili  0.6  /  DBili  x   /  AST  27  /  ALT  27  /  AlkPhos  87      CAPILLARY BLOOD GLUCOSE      POCT Blood Glucose.: 303 mg/dL (2018 12:30)  POCT Blood Glucose.: 345 mg/dL (2018 07:43)  POCT Blood Glucose.: 264 mg/dL (2018 21:47)  POCT Blood Glucose.: 255 mg/dL (2018 17:11)    LIVER FUNCTIONS - ( 2018 05:54 )  Alb: 2.6 g/dL / Pro: 6.3 gm/dL / ALK PHOS: 87 U/L / ALT: 27 U/L / AST: 27 U/L / GGT: x           PT/INR - ( 2018 15:32 )   PT: 16.9 sec;   INR: 1.50 ratio         PTT - ( 2018 15:32 )  PTT:30.2 sec  Urinalysis Basic - ( 2018 17:16 )    Color: Yellow / Appearance: Clear / S.005 / pH: x  Gluc: x / Ketone: Negative  / Bili: Negative / Urobili: Negative mg/dL   Blood: x / Protein: Negative mg/dL / Nitrite: Negative   Leuk Esterase: Negative / RBC: x / WBC x   Sq Epi: x / Non Sq Epi: x / Bacteria: x      ABG - ( 2018 06:08 )  pH, Arterial: see note pH, Blood: x     /  pCO2: see note /  pO2: see note / HCO3: see note / Base Excess: see note /  SaO2: see note       Assessment and Plan:  57 y/o F PMHx significant for hypertension, bipolar disorder, anxiety, ADHD, and diabetes mellitus type 2, who was referred to the ED by her PCP for further evaluation of a 1 week hx of progressive generalized weakness/malaise, subjective fevers, shortness of breath/hypoxia (SaO2 81%), and associated cough, found to have acute respiratory failure secondary to acute influenza B infection.    Sepsis with Acute Influenza B complicated by Acute Hypoxic Respiratory Failure:  -monitor tele  -cultures NGTD  -s/p bipap now on nasal canula  -repeat ABG  improved  -pulm f/u appreciated, V/Q scan low probability PE.  -cont. Oseltamivir  -CT chest without consolidations, or thrombosis  --> d/c IV abx.     Pulmonary hypertension/cardiomegaly:  -Elevated BNP  -echo --> EF 55%, moderate pulmonary HTN  -trops neg x 2  -cardio eval appreciated --> Ischemic evaluation with stress when influenza and respiratory status improves     Hypertension  -cont. Losartan 100mg po daily    Bipolar disorder/Anxiety/Depression w/ polypharmacy:   -Pt on multiple psychiatric meds including Aripiprazole 15mg po daily, Risperidone 2mg po bid, Olanzapine 5mg po qHS, Effexor XR 75mg 3tabs po daily, Citalopram 20mg po bid, and Xanax 2mg po qid   -She declines psychiatric evaluation for management.  -Due to concern for polypharmacy and possible contribution of presenting symptoms due to this will stop olanzapine, risperidone, effexor, atomoxetine and change xanax to PRN.   -c/w abilify, and citalopram     Diabetes mellitus type 2  -FS qAC/HS  -A1c 9.2, likely psych meds contributing to hyperglycemia   -cont. ISS per protocol    Hypokalemia  -supplement and monitor    obesity s/p gastric bypass    Vte ppx  ~cont. SCDs for now

## 2018-11-14 NOTE — PROGRESS NOTE ADULT - SUBJECTIVE AND OBJECTIVE BOX
59 y/o F PMHx significant for hypertension, bipolar disorder, anxiety, ADHD, and diabetes mellitus type 2, who was referred to the ED by her PCP for further evaluation of a 1 week hx of progressive generalized weakness/malaise, subjective fevers, shortness of breath/hypoxia (SaO2 81%), and associated cough. The patient was initially evaluated at a local urgent care center earlier last week (11/6) and was prescribed a course of Azithromycin which she completed as prescribed without improvement prompting her second visit (11/10) to the local urgent care center where she was prescribed a 10day regimen. The patient's symptoms progressed further prompting her visit to her PCP today (11/12). The patient was evaluated/co-managed w/ Pulmonology who referred the patient to the ED. In the ED the patient was found to be hypoxic SaO2 87%. Labs=> Na 134, K 2.5, HCO3 37, , pro-BNP 5068, ABG 7.46/50/68/90. RVP (+). CTA Chest => No pulmonary embolism. No infiltrate. Cardiomegaly. Findings suggesting pulmonary hypertension. Mosaic attenuation of the lungs suggesting air trapping. (12 Nov 2018 21:33). Found to be hypoxemic at primary care MD's office. No chest pain. No orthopnea. She has moderate NICOLE.     Today, she feels well. Continues to cough.    CURRENT CARDIAC WORKUP:       Echo:  11/13/18 Nml EF, trace MR, Moderate TR, moderate PAH      Allergies:   celery allergy (Faint)  No Known Drug Allergies      MEDICATIONS  (STANDING):  dextrose 5%. 1000 milliLiter(s) (50 mL/Hr) IV Continuous <Continuous>  dextrose 50% Injectable 12.5 Gram(s) IV Push once  dextrose 50% Injectable 25 Gram(s) IV Push once  dextrose 50% Injectable 25 Gram(s) IV Push once  insulin lispro (HumaLOG) corrective regimen sliding scale   SubCutaneous three times a day before meals  losartan 100 milliGRAM(s) Oral daily  oseltamivir 75 milliGRAM(s) Oral two times a day  potassium chloride    Tablet ER 40 milliEquivalent(s) Oral every 4 hours    MEDICATIONS  (PRN):  acetaminophen   Tablet .. 650 milliGRAM(s) Oral every 6 hours PRN Temp greater or equal to 38C (100.4F), Mild Pain (1 - 3)  ALPRAZolam 2 milliGRAM(s) Oral four times a day PRN Anxiety  benzonatate 100 milliGRAM(s) Oral three times a day PRN Cough  dextrose 40% Gel 15 Gram(s) Oral once PRN Blood Glucose LESS THAN 70 milliGRAM(s)/deciliter  docusate sodium 100 milliGRAM(s) Oral three times a day PRN Constipation  glucagon  Injectable 1 milliGRAM(s) IntraMuscular once PRN Glucose LESS THAN 70 milligrams/deciliter  ondansetron Injectable 4 milliGRAM(s) IV Push every 6 hours PRN Nausea  senna 2 Tablet(s) Oral at bedtime PRN Constipation      ROS:     Detailed ten system ROS was performed and was negative except for history as eluded to above.    no fever  no chills  no nausea  no vomiting  no diarrhea  no constipation  no melena  no hematochezia  no chest pain  no palpitations  no sob at rest  + dyspnea on exertion  + cough  no wheezing  no anorexia  no headache  no dizziness  no syncope  no weakness  no myalgia  no dysuria  no polyuria  no hematuria       Vital Signs Last 24 Hrs  T(C): 36.3 (14 Nov 2018 05:29), Max: 37.2 (13 Nov 2018 11:55)  T(F): 97.3 (14 Nov 2018 05:29), Max: 99 (13 Nov 2018 11:55)  HR: 84 (14 Nov 2018 05:29) (83 - 86)  BP: 145/91 (14 Nov 2018 05:29) (145/91 - 148/92)  RR: 18 (13 Nov 2018 11:55) (18 - 18)  SpO2: 100% (14 Nov 2018 05:29) (93% - 100%)    I&O's Summary    13 Nov 2018 07:01  -  14 Nov 2018 07:00  --------------------------------------------------------  IN: 1000 mL / OUT: 0 mL / NET: 1000 mL      PHYSICAL EXAM:    .phy      INTERPRETATION OF TELEMETRY:    ECG:    LABS:                        11.8   3.12  )-----------( 167      ( 14 Nov 2018 05:49 )             38.8     11-14    140  |  98  |  7   ----------------------------<  266<H>  3.0<L>   |  35<H>  |  0.67    Ca    7.7<L>      14 Nov 2018 05:49  Phos  2.7     11-12  Mg     1.7     11-13    TPro  6.3  /  Alb  2.6<L>  /  TBili  0.6  /  DBili  x   /  AST  27  /  ALT  27  /  AlkPhos  87  11-13 11-13 @ 03:19  Trop-I  0.025    11-12 @ 15:32  Trop-I  0.037    Pro BNP  5068 11-12 @ 15:32    PT/INR - ( 12 Nov 2018 15:32 )   PT: 16.9 sec;   INR: 1.50 ratio    PTT - ( 12 Nov 2018 15:32 )  PTT:30.2 sec      ABG - ( 13 Nov 2018 06:08 )  pH, Arterial: see note pH, Blood: x     /  pCO2: see note /  pO2: see note / HCO3: see note / Base Excess: see note /  SaO2: see note       RADIOLOGY & ADDITIONAL STUDIES:    CT Angio Chest PE Protocol w/ IV Cont (11.12.18 @ 18:34) >  IMPRESSION:  No pulmonary embolism. No infiltrate. Cardiomegaly. Findings suggesting pulmonary hypertension. Mosaic attenuation of the lungs suggesting air trapping. There is prominence of the main pulmonary artery measuring 3.8 cm suggesting pulmonary hypertension.

## 2018-11-14 NOTE — PROGRESS NOTE ADULT - SUBJECTIVE AND OBJECTIVE BOX
Date of service: 18 @ 12:54    OOB to chair  Upper respiratory congestion is improving  No SOB at rest    ROS: no fever or chills; denies dizziness, no HA, no abdominal pain, no diarrhea or constipation; no dysuria, no legs pain, no rashes    MEDICATIONS  (STANDING):  dextrose 5%. 1000 milliLiter(s) (50 mL/Hr) IV Continuous <Continuous>  dextrose 50% Injectable 12.5 Gram(s) IV Push once  dextrose 50% Injectable 25 Gram(s) IV Push once  dextrose 50% Injectable 25 Gram(s) IV Push once  insulin lispro (HumaLOG) corrective regimen sliding scale   SubCutaneous three times a day before meals  losartan 100 milliGRAM(s) Oral daily  oseltamivir 75 milliGRAM(s) Oral two times a day  potassium chloride    Tablet ER 40 milliEquivalent(s) Oral every 4 hours      Vital Signs Last 24 Hrs  T(C): 37 (2018 10:14), Max: 37 (2018 10:14)  T(F): 98.6 (2018 10:14), Max: 98.6 (2018 10:14)  HR: 70 (2018 10:14) (70 - 86)  BP: 110/83 (2018 10:14) (110/83 - 148/92)  BP(mean): --  RR: 18 (2018 10:14) (18 - 18)  SpO2: 97% (2018 10:14) (93% - 100%)    Physical Exam:      Constitutional: frail looking  HEENT: NC/AT, EOMI, PERRLA, conjunctivae clear  Neck: supple; thyroid not palpable  Back: no tenderness  Respiratory: respiratory effort normal; decreased BS at bases  Cardiovascular: S1S2 regular, no murmurs  Abdomen: soft, not tender, not distended, positive BS  Genitourinary: no suprapubic tenderness  Lymphatic: no LN palpable  Musculoskeletal: no muscle tenderness, no joint swelling or tenderness  Extremities: no pedal edema  Neurological/ Psychiatric: AxOx3, moving all extremities  Skin: no rashes; no palpable lesions    Labs: all available labs reviewed                           312  )-----------( 167      ( 2018 05:49 )             38.8     -    140  |  98  |  7   ----------------------------<  266<H>  3.0<L>   |  35<H>  |  0.67    Ca    7.7<L>      2018 05:49  Phos  2.7     11  Mg     1.7         TPro  6.3  /  Alb  2.6<L>  /  TBili  0.6  /  DBili  x   /  AST  27  /  ALT  27  /  AlkPhos  87       LIVER FUNCTIONS - ( 2018 05:54 )  Alb: 2.6 g/dL / Pro: 6.3 gm/dL / ALK PHOS: 87 U/L / ALT: 27 U/L / AST: 27 U/L / GGT: x           Urinalysis Basic - ( 2018 17:16 )    Color: Yellow / Appearance: Clear / S.005 / pH: x  Gluc: x / Ketone: Negative  / Bili: Negative / Urobili: Negative mg/dL   Blood: x / Protein: Negative mg/dL / Nitrite: Negative   Leuk Esterase: Negative / RBC: x / WBC x   Sq Epi: x / Non Sq Epi: x / Bacteria: x    Rapid Respiratory Viral Panel (18 @ 15:32)    Rapid RVP Result: Detected:     Influenza B (RapRVP): Detected: Called to Nurse/Doctor.t favero rn        Culture - Urine (collected 2018 17:16)  Source: .Urine Clean Catch (Midstream)  Final Report (2018 22:51):    No growth    Culture - Blood (collected 2018 15:32)  Source: .Blood None  Preliminary Report (2018 23:01):    No growth to date.    Culture - Blood (collected 2018 15:32)  Source: .Blood None  Preliminary Report (2018 23:01):    No growth to date.          Radiology: all available radiological tests reviewed    Advanced directives addressed: full resuscitation

## 2018-11-14 NOTE — PROGRESS NOTE ADULT - SUBJECTIVE AND OBJECTIVE BOX
Patient is a 58y old  Female who presents with a chief complaint of Cough, shortness of breath (2018 21:33)      HPI:  57 y/o F PMHx significant for hypertension, bipolar disorder, anxiety, ADHD, and diabetes mellitus type 2, who was referred to the ED by her PCP for further evaluation of a 1 week hx of progressive generalized weakness/malaise, subjective fevers, shortness of breath/hypoxia (SaO2 81%), and associated cough. The patient was initially evaluated at a local urgent care center earlier last week () and was prescribed a course of Azithromycin which she completed as prescribed without improvement prompting her second visit (11/10) to the local urgent care center where she was prescribed a 10day regimen. The patient's symptoms progressed further prompting her visit to her PCP today (). The patient was evaluated/co-managed w/ Pulmonology who referred the patient to the ED. In the ED the patient was found to be hypoxic SaO2 87%. Labs=> Na 134, K 2.5, HCO3 37, , pro-BNP 5068, ABG 7.46/50/68/90. RVP (+). CTA Chest => No pulmonary embolism. No infiltrate. Cardiomegaly. Findings suggesting pulmonary hypertension. Mosaic attenuation of the lungs suggesting air trapping. (2018 21:33)      PAST MEDICAL & SURGICAL HISTORY:  ADHD (attention deficit hyperactivity disorder)  COLLEEN (obstructive sleep apnea)  Hypertension  Diabetes mellitus: Type 2  Bipolar 1 disorder  Gastric bypass status for obesity   delivery delivered: x2      PREVIOUS DIAGNOSTIC TESTING:      MEDICATIONS  (STANDING):  azithromycin  IVPB 500 milliGRAM(s) IV Intermittent every 24 hours  cefTRIAXone Injectable. 1000 milliGRAM(s) IV Push every 24 hours  dextrose 5%. 1000 milliLiter(s) (50 mL/Hr) IV Continuous <Continuous>  dextrose 50% Injectable 12.5 Gram(s) IV Push once  dextrose 50% Injectable 25 Gram(s) IV Push once  dextrose 50% Injectable 25 Gram(s) IV Push once  insulin lispro (HumaLOG) corrective regimen sliding scale   SubCutaneous three times a day before meals  losartan 100 milliGRAM(s) Oral daily  oseltamivir 75 milliGRAM(s) Oral two times a day  potassium chloride    Tablet ER 40 milliEquivalent(s) Oral every 4 hours  potassium chloride  10 mEq/100 mL IVPB 10 milliEquivalent(s) IV Intermittent every 1 hour    MEDICATIONS  (PRN):  acetaminophen   Tablet .. 650 milliGRAM(s) Oral every 6 hours PRN Temp greater or equal to 38C (100.4F), Mild Pain (1 - 3)  ALPRAZolam 2 milliGRAM(s) Oral four times a day PRN Anxiety  benzonatate 100 milliGRAM(s) Oral three times a day PRN Cough  dextrose 40% Gel 15 Gram(s) Oral once PRN Blood Glucose LESS THAN 70 milliGRAM(s)/deciliter  docusate sodium 100 milliGRAM(s) Oral three times a day PRN Constipation  glucagon  Injectable 1 milliGRAM(s) IntraMuscular once PRN Glucose LESS THAN 70 milligrams/deciliter  ondansetron Injectable 4 milliGRAM(s) IV Push every 6 hours PRN Nausea  senna 2 Tablet(s) Oral at bedtime PRN Constipation      FAMILY HISTORY:  Family history of early CAD (Father)      SOCIAL HISTORY: lives ath ome     REVIEW OF SYSTEM:  NICOLE, cough, chest tightness, otherwise 12 point ROS negative     Vital Signs Last 24 Hrs  T(C): 36.3 (2018 03:02), Max: 37.7 (2018 15:25)  T(F): 97.4 (2018 03:02), Max: 99.8 (2018 15:25)  HR: 71 (2018 03:02) (71 - 86)  BP: 107/80 (2018 03:02) (107/80 - 150/101)  BP(mean): --  RR: 22 (2018 22:48) (22 - 24)  SpO2: 100% (2018 03:02) (87% - 100%)    I&O's Summary    PHYSICAL EXAM  General Appearance: cooperative, no acute distress,   HEENT: PERRL, conjunctiva clear, EOM's intact, non injected pharynx, no exudate, TM   normal  Neck: Supple, , no adenopathy, thyroid: not enlarged, no carotid bruit or JVD  Back: Symmetric, no  tenderness,no soft tissue tenderness  Lungs: Clear to auscultation bilateral,no adventitious breath sounds, normal   expiratory phase  Heart: Regular rate and rhythm, S1, S2 normal, no murmur, rub or gallop  Abdomen: Soft, non-tender, bowel sounds active , no hepatosplenomegaly  Extremities: no cyanosis or edema, no joint swelling  Skin: Skin color, texture normal, no rashes   Neurologic: Alert and oriented X3 , cranial nerves intact, sensory and motor normal,    ECG:    LABS:                          11.5   3.13  )-----------( 175      ( 2018 05:54 )             36.7     13    140  |  95<L>  |  6<L>  ----------------------------<  284<H>  2.3<LL>   |  38<H>  |  0.68    Ca    7.7<L>      2018 05:54  Phos  2.7       Mg     1.7         TPro  6.3  /  Alb  2.6<L>  /  TBili  0.6  /  DBili  x   /  AST  27  /  ALT  27  /  AlkPhos  87      CARDIAC MARKERS ( 2018 03:19 )  0.025 ng/mL / x     / x     / x     / x      CARDIAC MARKERS ( 2018 15:32 )  0.037 ng/mL / x     / x     / x     / x            Pro BNP  5068  @ 15:32  D Dimer  --  @ 15:32    PT/INR - ( 2018 15:32 )   PT: 16.9 sec;   INR: 1.50 ratio         PTT - ( 2018 15:32 )  PTT:30.2 sec  Urinalysis Basic - ( 2018 17:16 )    Color: Yellow / Appearance: Clear / S.005 / pH: x  Gluc: x / Ketone: Negative  / Bili: Negative / Urobili: Negative mg/dL   Blood: x / Protein: Negative mg/dL / Nitrite: Negative   Leuk Esterase: Negative / RBC: x / WBC x   Sq Epi: x / Non Sq Epi: x / Bacteria: x      ABG - ( 2018 06:08 )  pH, Arterial: 7.40  pH, Blood: x     /  pCO2: 63    /  pO2: Unable to calc Instrument unable to calculate PO2A.  A-Gas Repeated on both instruments and also repeated using the capillary  tube. Confirmed there were no air bubbles and mixed the specimen well  before running on the instrument.  Informed ASHKAN Hoang in 3E.  18 06:31 / HCO3: 39    / Base Excess: 12.0  /  SaO2: 96                    RADIOLOGY & ADDITIONAL STUDIES:

## 2018-11-14 NOTE — PROGRESS NOTE ADULT - ASSESSMENT
Hypopxemia  Hypokalemia  Influenza  r/o CHF  HTN  DM  Possible PAH, sec to COLLEEN    Suggest:    Continue current care and pulm treatment.   Echo shows normal LV function. moderate PAH and no right heart strain  Supplement K  Continue current cardiac treatment.   BNP is high, can be sec to COLLEEN, PAH. May use K sparing diuretic.  Eventually will get ischemia evaluation with stress when influenza and respiratory status improves - as out pt.

## 2018-11-14 NOTE — PROGRESS NOTE ADULT - ASSESSMENT
57 y/o Female with h/o hypertension, bipolar disorder, anxiety, ADHD, and diabetes mellitus type 2 was admitted on 11/12 for progressive generalized weakness/malaise, subjective fevers, shortness of breath/hypoxia (SaO2 81%), and associated cough. The patient was initially evaluated at a local urgent care center earlier last week (11/6) and was prescribed  Azithromycin PO which she completed as prescribed without improvement prompting her second visit (11/10) to the local urgent care center where she was prescribed further abx. The patient's symptoms progressed further prompting her visit to her PCP on 11/12 who referred the patient to the ED. In the ED the patient was found with low grade fever and hypoxia SaO2 87%.    1. Low grade fever improving. Influenza B. Acute hypoxic respiratory failure improving. Obesity.  -improving  -hydration  -O2 therapy  -on oseltamivir 75 mg PO q12h # 2  -tolerating abx well so far; no side effects noted  -droplet isolation  -continue antiviral therapy for 5 days  -monitor temps  -f/u CBC  -supportive care  2. Other issues:   -care per medicine

## 2018-11-15 LAB
ANION GAP SERPL CALC-SCNC: 8 MMOL/L — SIGNIFICANT CHANGE UP (ref 5–17)
BUN SERPL-MCNC: 7 MG/DL — SIGNIFICANT CHANGE UP (ref 7–23)
CALCIUM SERPL-MCNC: 7.9 MG/DL — LOW (ref 8.5–10.1)
CHLORIDE SERPL-SCNC: 97 MMOL/L — SIGNIFICANT CHANGE UP (ref 96–108)
CO2 SERPL-SCNC: 36 MMOL/L — HIGH (ref 22–31)
CREAT SERPL-MCNC: 0.6 MG/DL — SIGNIFICANT CHANGE UP (ref 0.5–1.3)
GLUCOSE BLDC GLUCOMTR-MCNC: 212 MG/DL — HIGH (ref 70–99)
GLUCOSE BLDC GLUCOMTR-MCNC: 310 MG/DL — HIGH (ref 70–99)
GLUCOSE BLDC GLUCOMTR-MCNC: 312 MG/DL — HIGH (ref 70–99)
GLUCOSE SERPL-MCNC: 295 MG/DL — HIGH (ref 70–99)
HCT VFR BLD CALC: 38.9 % — SIGNIFICANT CHANGE UP (ref 34.5–45)
HGB BLD-MCNC: 11.9 G/DL — SIGNIFICANT CHANGE UP (ref 11.5–15.5)
MCHC RBC-ENTMCNC: 28.5 PG — SIGNIFICANT CHANGE UP (ref 27–34)
MCHC RBC-ENTMCNC: 30.6 GM/DL — LOW (ref 32–36)
MCV RBC AUTO: 93.3 FL — SIGNIFICANT CHANGE UP (ref 80–100)
NRBC # BLD: 0 /100 WBCS — SIGNIFICANT CHANGE UP (ref 0–0)
PLATELET # BLD AUTO: 165 K/UL — SIGNIFICANT CHANGE UP (ref 150–400)
POTASSIUM SERPL-MCNC: 3.4 MMOL/L — LOW (ref 3.5–5.3)
POTASSIUM SERPL-SCNC: 3.4 MMOL/L — LOW (ref 3.5–5.3)
RBC # BLD: 4.17 M/UL — SIGNIFICANT CHANGE UP (ref 3.8–5.2)
RBC # FLD: 15.2 % — HIGH (ref 10.3–14.5)
SODIUM SERPL-SCNC: 141 MMOL/L — SIGNIFICANT CHANGE UP (ref 135–145)
WBC # BLD: 3.91 K/UL — SIGNIFICANT CHANGE UP (ref 3.8–10.5)
WBC # FLD AUTO: 3.91 K/UL — SIGNIFICANT CHANGE UP (ref 3.8–10.5)

## 2018-11-15 RX ORDER — POTASSIUM CHLORIDE 20 MEQ
40 PACKET (EA) ORAL ONCE
Qty: 0 | Refills: 0 | Status: COMPLETED | OUTPATIENT
Start: 2018-11-15 | End: 2018-11-15

## 2018-11-15 RX ADMIN — Medication 4: at 18:38

## 2018-11-15 RX ADMIN — Medication 650 MILLIGRAM(S): at 05:55

## 2018-11-15 RX ADMIN — Medication 650 MILLIGRAM(S): at 20:39

## 2018-11-15 RX ADMIN — Medication 75 MILLIGRAM(S): at 18:26

## 2018-11-15 RX ADMIN — Medication 2 MILLIGRAM(S): at 11:26

## 2018-11-15 RX ADMIN — Medication 2: at 11:29

## 2018-11-15 RX ADMIN — Medication 75 MILLIGRAM(S): at 05:47

## 2018-11-15 RX ADMIN — CITALOPRAM 40 MILLIGRAM(S): 10 TABLET, FILM COATED ORAL at 11:26

## 2018-11-15 RX ADMIN — Medication 40 MILLIEQUIVALENT(S): at 11:25

## 2018-11-15 RX ADMIN — Medication 100 MILLIGRAM(S): at 21:59

## 2018-11-15 RX ADMIN — Medication 4: at 08:47

## 2018-11-15 RX ADMIN — LOSARTAN POTASSIUM 100 MILLIGRAM(S): 100 TABLET, FILM COATED ORAL at 05:47

## 2018-11-15 RX ADMIN — ARIPIPRAZOLE 15 MILLIGRAM(S): 15 TABLET ORAL at 11:25

## 2018-11-15 NOTE — PROGRESS NOTE ADULT - ASSESSMENT
Hypopxemia  Hypokalemia  Influenza  r/o CHF  HTN  DM  Possible PAH, sec to COLLEEN    Suggest:    Continue current care and pulm treatment.   Echo shows normal LV function. moderate PAH  Supplement K  Continue current cardiac treatment.   BNP is high, can be sec to COLLEEN, PAH. May use K sparing diuretic. Add aldactone  Eventually will get ischemia evaluation with stress when influenza and respiratory status improves - planning as out pt.

## 2018-11-15 NOTE — PROGRESS NOTE ADULT - SUBJECTIVE AND OBJECTIVE BOX
59 y/o F PMHx significant for hypertension, bipolar disorder, anxiety, ADHD, and diabetes mellitus type 2, who was referred to the ED by her PCP for further evaluation of a 1 week hx of progressive generalized weakness/malaise, subjective fevers, shortness of breath/hypoxia (SaO2 81%), and associated cough. The patient was initially evaluated at a local urgent care center earlier last week (11/6) and was prescribed a course of Azithromycin which she completed as prescribed without improvement prompting her second visit (11/10) to the local urgent care center where she was prescribed a 10day regimen. The patient's symptoms progressed further prompting her visit to her PCP today (11/12). The patient was evaluated/co-managed w/ Pulmonology who referred the patient to the ED. In the ED the patient was found to be hypoxic SaO2 87%. Labs=> Na 134, K 2.5, HCO3 37, , pro-BNP 5068, ABG 7.46/50/68/90. RVP (+). CTA Chest => No pulmonary embolism. No infiltrate. Cardiomegaly. Findings suggesting pulmonary hypertension. Mosaic attenuation of the lungs suggesting air trapping.    11/13: Pt breathing improving, weaned to nasal cannula. CT without consolidations.  Will continue supportive care for acute influenza B.     11/14: Pt feeling better, breathing improved.  Spoke to her about her psych meds, states she receives them from her psychiatrist.  She declined to meet with our inpatient psychiatrist for med management.  Pt currently being treated for flu.  I also spoke to her  via phone for verification of her meds.  He states he does not really know her meds but is concerned for her polypharmacy.    11/15: Spoke to pt at length regarding her diagnosis of pulmonary hypertension and her need for ongoing outpatient pulmonary and cardiac follow up.  We also spoke about her psych meds and she is comfortable with my discontinuation of many of her home meds.  Her mood is good on celexa, abilify and xanax PRN and is comfortable with this regimen moving forward.  I spoke to her daughter/HCP earlier about her living situation with her .  She clarified to me that nobody is to talk to him about her medical issues.  I acknowledged this and also verified this with the pt.  Currently pt is comfortable and offers no complaints.    REVIEW OF SYSTEMS: All other review of systems is negative unless indicated above.    Vital Signs Last 24 Hrs  T(C): 37.1 (15 Nov 2018 11:28), Max: 37.1 (15 Nov 2018 11:28)  T(F): 98.7 (15 Nov 2018 11:28), Max: 98.7 (15 Nov 2018 11:28)  HR: 58 (15 Nov 2018 11:28) (58 - 74)  BP: 159/96 (15 Nov 2018 11:28) (147/96 - 159/96)  BP(mean): --  RR: 18 (15 Nov 2018 11:28) (18 - 18)  SpO2: 96% (15 Nov 2018 11:28) (96% - 98%)    PHYSICAL EXAM:    Constitutional: NAD, obese  HEENT: PERR, EOMI, Normal Hearing, MMM  Neck: Soft and supple  Respiratory: Breath sounds diminished  Cardiovascular: S1 and S2, regular rate and rhythm, no Murmurs, gallops or rubs  Gastrointestinal: Bowel Sounds present, soft, nontender, nondistended, no guarding, no rebound  Extremities: No peripheral edema  Neurological: A/O x 3, no focal deficits in my limited exam  Skin: No rashes    MEDICATIONS  (STANDING):  ARIPiprazole 15 milliGRAM(s) Oral daily  citalopram 40 milliGRAM(s) Oral daily  dextrose 5%. 1000 milliLiter(s) (50 mL/Hr) IV Continuous <Continuous>  dextrose 50% Injectable 12.5 Gram(s) IV Push once  dextrose 50% Injectable 25 Gram(s) IV Push once  dextrose 50% Injectable 25 Gram(s) IV Push once  insulin lispro (HumaLOG) corrective regimen sliding scale   SubCutaneous three times a day before meals  losartan 100 milliGRAM(s) Oral daily  oseltamivir 75 milliGRAM(s) Oral two times a day    MEDICATIONS  (PRN):  acetaminophen   Tablet .. 650 milliGRAM(s) Oral every 6 hours PRN Temp greater or equal to 38C (100.4F), Mild Pain (1 - 3)  ALPRAZolam 2 milliGRAM(s) Oral four times a day PRN Anxiety  benzonatate 100 milliGRAM(s) Oral three times a day PRN Cough  dextrose 40% Gel 15 Gram(s) Oral once PRN Blood Glucose LESS THAN 70 milliGRAM(s)/deciliter  docusate sodium 100 milliGRAM(s) Oral three times a day PRN Constipation  glucagon  Injectable 1 milliGRAM(s) IntraMuscular once PRN Glucose LESS THAN 70 milligrams/deciliter  ondansetron Injectable 4 milliGRAM(s) IV Push every 6 hours PRN Nausea  senna 2 Tablet(s) Oral at bedtime PRN Constipation                              11.9   3.91  )-----------( 165      ( 15 Nov 2018 05:41 )             38.9     11-15    141  |  97  |  7   ----------------------------<  295<H>  3.4<L>   |  36<H>  |  0.60    Ca    7.9<L>      15 Nov 2018 05:41      CAPILLARY BLOOD GLUCOSE      POCT Blood Glucose.: 212 mg/dL (15 Nov 2018 11:27)  POCT Blood Glucose.: 312 mg/dL (15 Nov 2018 08:40)  POCT Blood Glucose.: 292 mg/dL (14 Nov 2018 17:03)        Assessment and Plan:  59 y/o F PMHx significant for hypertension, bipolar disorder, anxiety, ADHD, and diabetes mellitus type 2, who was referred to the ED by her PCP for further evaluation of a 1 week hx of progressive generalized weakness/malaise, subjective fevers, shortness of breath/hypoxia (SaO2 81%), and associated cough, found to have acute respiratory failure secondary to acute influenza B infection.    Sepsis with Acute Influenza B complicated by Acute Hypoxic Respiratory Failure:  -monitor tele - no significant events  -cultures NGTD  -s/p bipap now on nasal canula  -repeat ABG  improved  -pulm f/u appreciated, V/Q scan low probability PE.  -cont. Oseltamivir  -CT chest without consolidations, or thrombosis  --> d/c IV abx.   -pulse ox home o2 eval prior to d/c.     Pulmonary hypertension/cardiomegaly:  -Elevated BNP  -echo --> EF 55%, moderate pulmonary HTN  -trops neg x 2  -cardio eval appreciated --> Ischemic evaluation with stress, right heart cath when influenza and respiratory status improves as outpatient with Dr. Gipson.     Hypertension  -cont. Losartan 100mg po daily    Bipolar disorder/Anxiety/Depression w/ polypharmacy:   -Pt on multiple psychiatric meds including Aripiprazole 15mg po daily, Risperidone 2mg po bid, Olanzapine 5mg po qHS, Effexor XR 75mg 3tabs po daily, Citalopram 20mg po bid, and Xanax 2mg po qid   -She declines psychiatric evaluation for management due to personal reasons. However she is allowing me to manage her meds.  -Due to concern for polypharmacy and possible contribution of presenting symptoms will stop olanzapine, risperidone, effexor, atomoxetine and change xanax to PRN.   -c/w abilify, and citalopram and discharge on these meds.     Diabetes mellitus type 2  -FS qAC/HS  -A1c 9.2, likely psych meds contributing to hyperglycemia   -cont. ISS per protocol    Hypokalemia  -supplement and monitor    obesity s/p gastric bypass    Vte ppx  ~cont. SCDs for now    Dispo:  -pending final pulm and cardiac recs.  Continue treatment for tamiflu.  Per pt and daughter/HCP wishes, DO NOT speak to  concerning any medical information, all information should be raised only with pt and daughter/HCP moving forward.  I anticipate discharge over next 24-48 hours. 57 y/o F PMHx significant for hypertension, bipolar disorder, anxiety, ADHD, and diabetes mellitus type 2, who was referred to the ED by her PCP for further evaluation of a 1 week hx of progressive generalized weakness/malaise, subjective fevers, shortness of breath/hypoxia (SaO2 81%), and associated cough. The patient was initially evaluated at a local urgent care center earlier last week (11/6) and was prescribed a course of Azithromycin which she completed as prescribed without improvement prompting her second visit (11/10) to the local urgent care center where she was prescribed a 10day regimen. The patient's symptoms progressed further prompting her visit to her PCP today (11/12). The patient was evaluated/co-managed w/ Pulmonology who referred the patient to the ED. In the ED the patient was found to be hypoxic SaO2 87%. Labs=> Na 134, K 2.5, HCO3 37, , pro-BNP 5068, ABG 7.46/50/68/90. RVP (+). CTA Chest => No pulmonary embolism. No infiltrate. Cardiomegaly. Findings suggesting pulmonary hypertension. Mosaic attenuation of the lungs suggesting air trapping.    11/13: Pt breathing improving, weaned to nasal cannula. CT without consolidations.  Will continue supportive care for acute influenza B.     11/14: Pt feeling better, breathing improved.  Spoke to her about her psych meds, states she receives them from her psychiatrist.  She declined to meet with our inpatient psychiatrist for med management.  Pt currently being treated for flu.  I also spoke to her  via phone for verification of her meds.  He states he does not really know her meds but is concerned for her polypharmacy.    11/15: Spoke to pt at length regarding her diagnosis of pulmonary hypertension and her need for ongoing outpatient pulmonary and cardiac follow up.  We also spoke about her psych meds and she is comfortable with my discontinuation of many of her home meds.  Her mood is good on celexa, abilify and xanax PRN and is comfortable with this regimen moving forward.  I spoke to her daughter/HCP earlier about her living situation with her .  She clarified to me that nobody is to talk to him about her medical issues.  I acknowledged this and also verified this with the pt.  Currently pt is comfortable and offers no complaints.    REVIEW OF SYSTEMS: All other review of systems is negative unless indicated above.    Vital Signs Last 24 Hrs  T(C): 37.1 (15 Nov 2018 11:28), Max: 37.1 (15 Nov 2018 11:28)  T(F): 98.7 (15 Nov 2018 11:28), Max: 98.7 (15 Nov 2018 11:28)  HR: 58 (15 Nov 2018 11:28) (58 - 74)  BP: 159/96 (15 Nov 2018 11:28) (147/96 - 159/96)  BP(mean): --  RR: 18 (15 Nov 2018 11:28) (18 - 18)  SpO2: 96% (15 Nov 2018 11:28) (96% - 98%)    PHYSICAL EXAM:    Constitutional: NAD, obese  HEENT: PERR, EOMI, Normal Hearing, MMM  Neck: Soft and supple  Respiratory: Breath sounds diminished  Cardiovascular: S1 and S2, regular rate and rhythm, no Murmurs, gallops or rubs  Gastrointestinal: Bowel Sounds present, soft, nontender, nondistended, no guarding, no rebound  Extremities: No peripheral edema  Neurological: A/O x 3, no focal deficits in my limited exam  Skin: No rashes    MEDICATIONS  (STANDING):  ARIPiprazole 15 milliGRAM(s) Oral daily  citalopram 40 milliGRAM(s) Oral daily  dextrose 5%. 1000 milliLiter(s) (50 mL/Hr) IV Continuous <Continuous>  dextrose 50% Injectable 12.5 Gram(s) IV Push once  dextrose 50% Injectable 25 Gram(s) IV Push once  dextrose 50% Injectable 25 Gram(s) IV Push once  insulin lispro (HumaLOG) corrective regimen sliding scale   SubCutaneous three times a day before meals  losartan 100 milliGRAM(s) Oral daily  oseltamivir 75 milliGRAM(s) Oral two times a day    MEDICATIONS  (PRN):  acetaminophen   Tablet .. 650 milliGRAM(s) Oral every 6 hours PRN Temp greater or equal to 38C (100.4F), Mild Pain (1 - 3)  ALPRAZolam 2 milliGRAM(s) Oral four times a day PRN Anxiety  benzonatate 100 milliGRAM(s) Oral three times a day PRN Cough  dextrose 40% Gel 15 Gram(s) Oral once PRN Blood Glucose LESS THAN 70 milliGRAM(s)/deciliter  docusate sodium 100 milliGRAM(s) Oral three times a day PRN Constipation  glucagon  Injectable 1 milliGRAM(s) IntraMuscular once PRN Glucose LESS THAN 70 milligrams/deciliter  ondansetron Injectable 4 milliGRAM(s) IV Push every 6 hours PRN Nausea  senna 2 Tablet(s) Oral at bedtime PRN Constipation                              11.9   3.91  )-----------( 165      ( 15 Nov 2018 05:41 )             38.9     11-15    141  |  97  |  7   ----------------------------<  295<H>  3.4<L>   |  36<H>  |  0.60    Ca    7.9<L>      15 Nov 2018 05:41      CAPILLARY BLOOD GLUCOSE      POCT Blood Glucose.: 212 mg/dL (15 Nov 2018 11:27)  POCT Blood Glucose.: 312 mg/dL (15 Nov 2018 08:40)  POCT Blood Glucose.: 292 mg/dL (14 Nov 2018 17:03)        Assessment and Plan:  57 y/o F PMHx significant for hypertension, bipolar disorder, anxiety, ADHD, and diabetes mellitus type 2, who was referred to the ED by her PCP for further evaluation of a 1 week hx of progressive generalized weakness/malaise, subjective fevers, shortness of breath/hypoxia (SaO2 81%), and associated cough, found to have acute respiratory failure secondary to acute influenza B infection.    Sepsis with Acute Influenza B complicated by Acute Hypoxic Respiratory Failure:  -monitor tele - no significant events  -cultures NGTD  -s/p bipap now on nasal canula  -repeat ABG  improved  -pulm f/u appreciated, V/Q scan low probability PE.  -cont. Oseltamivir  -CT chest without consolidations, or thrombosis  --> d/c IV abx.   -pulse ox home o2 eval prior to d/c.     Pulmonary hypertension/cardiomegaly:  -Elevated BNP  -echo --> EF 55%, moderate pulmonary HTN  -trops neg x 2  -cardio eval appreciated --> Ischemic evaluation with stress, right heart cath when influenza and respiratory status improves as outpatient with Dr. Gipson.     Hypertension  -cont. Losartan 100mg po daily    Bipolar disorder/Anxiety/Depression w/ polypharmacy:   -Pt on multiple psychiatric meds including Aripiprazole 15mg po daily, Risperidone 2mg po bid, Olanzapine 5mg po qHS, Effexor XR 75mg 3tabs po daily, Citalopram 20mg po bid, and Xanax 2mg po qid   -She declines psychiatric evaluation for management due to personal reasons. However she is allowing me to manage her meds.  -Due to concern for polypharmacy and possible contribution of presenting symptoms will stop olanzapine, risperidone, effexor, atomoxetine and change xanax to PRN.   -c/w abilify, and citalopram and discharge on these meds.     Diabetes mellitus type 2  -FS qAC/HS  -A1c 9.2, likely psych meds contributing to hyperglycemia   -cont. ISS per protocol    Hypokalemia  -supplement and monitor    obesity s/p gastric bypass, probable COLLEEN:  -use to be on home CPAP, f/u outpatient pulmonary.    Vte ppx  ~cont. SCDs for now    Dispo:  -pending final pulm and cardiac recs.  Continue treatment for tamiflu.  Per pt and daughter/HCP wishes, DO NOT speak to  concerning any medical information, all information should be raised only with pt and daughter/HCP moving forward.  I anticipate discharge over next 24-48 hours.

## 2018-11-15 NOTE — PROGRESS NOTE ADULT - ASSESSMENT
1) Hypoxemic Respiratory Failure with Hypercapnia  2) Dyspnea  3) +Influenza B  4) Abnormal CT Chest  5) Bronchiolitis  6) Right Ventricular Enlargement  7) Enlarged Pulmonary Artery   8) Uncontrolled Diabetes   9) Electrolyte Abnormalities   10) Pulmonary Hypertension    57 y/o F PMHx significant for hypertension, bipolar disorder, anxiety, ADHD, and diabetes mellitus type 2, who was referred to the ED by her PCP for further evaluation of a 1 week hx of progressive generalized weakness/malaise, subjective fevers, shortness of breath/hypoxia (SaO2 81%), and associated cough.  Patient was seen emergently in the office as a STAT Pulmonary consult and patient was noted to be persistently hypoxemic without improvement in hyperventilation.   The thought was possible OHS but patient needed to be ruled out for a PE.  CT Chest reviewed, shows enlarged pulmonary artery with enlarged RV, no pulmonary embolism.  Mosaic attenuation also noted that could be suggestive of small airway disease or small vessel disease (chronic thromboembolic pulmonary hypertension).  Patient has chronic hypercapnic respiratory failure, agree with BiPAP 10/5, will add nocturnal back up rate of 15.   Agree with holding diuretics, electrolyte repletion in light of abnormalities  Echocardiogram reviewed and shows findings suggestive of right ventricular overload/pulmonary hypertension  V/Q Scan is low probability, CTEPH ruled out.  Appreciate Cardiology recommendations - good candidate for a RHC now.   Continue Tamiflu  Will continue to monitor  Thank you for the consult

## 2018-11-15 NOTE — PROGRESS NOTE ADULT - SUBJECTIVE AND OBJECTIVE BOX
Patient is a 58y old  Female who presents with a chief complaint of Cough, shortness of breath (2018 21:33)      HPI:  59 y/o F PMHx significant for hypertension, bipolar disorder, anxiety, ADHD, and diabetes mellitus type 2, who was referred to the ED by her PCP for further evaluation of a 1 week hx of progressive generalized weakness/malaise, subjective fevers, shortness of breath/hypoxia (SaO2 81%), and associated cough. The patient was initially evaluated at a local urgent care center earlier last week () and was prescribed a course of Azithromycin which she completed as prescribed without improvement prompting her second visit (11/10) to the local urgent care center where she was prescribed a 10day regimen. The patient's symptoms progressed further prompting her visit to her PCP today (). The patient was evaluated/co-managed w/ Pulmonology who referred the patient to the ED. In the ED the patient was found to be hypoxic SaO2 87%. Labs=> Na 134, K 2.5, HCO3 37, , pro-BNP 5068, ABG 7.46/50/68/90. RVP (+). CTA Chest => No pulmonary embolism. No infiltrate. Cardiomegaly. Findings suggesting pulmonary hypertension. Mosaic attenuation of the lungs suggesting air trapping. (2018 21:33)    11/15/18  Patient underwent a V/Q  Low probability  Dyspnea is improving     PAST MEDICAL & SURGICAL HISTORY:  ADHD (attention deficit hyperactivity disorder)  COLLEEN (obstructive sleep apnea)  Hypertension  Diabetes mellitus: Type 2  Bipolar 1 disorder  Gastric bypass status for obesity   delivery delivered: x2      PREVIOUS DIAGNOSTIC TESTING:      MEDICATIONS  (STANDING):  azithromycin  IVPB 500 milliGRAM(s) IV Intermittent every 24 hours  cefTRIAXone Injectable. 1000 milliGRAM(s) IV Push every 24 hours  dextrose 5%. 1000 milliLiter(s) (50 mL/Hr) IV Continuous <Continuous>  dextrose 50% Injectable 12.5 Gram(s) IV Push once  dextrose 50% Injectable 25 Gram(s) IV Push once  dextrose 50% Injectable 25 Gram(s) IV Push once  insulin lispro (HumaLOG) corrective regimen sliding scale   SubCutaneous three times a day before meals  losartan 100 milliGRAM(s) Oral daily  oseltamivir 75 milliGRAM(s) Oral two times a day  potassium chloride    Tablet ER 40 milliEquivalent(s) Oral every 4 hours  potassium chloride  10 mEq/100 mL IVPB 10 milliEquivalent(s) IV Intermittent every 1 hour    MEDICATIONS  (PRN):  acetaminophen   Tablet .. 650 milliGRAM(s) Oral every 6 hours PRN Temp greater or equal to 38C (100.4F), Mild Pain (1 - 3)  ALPRAZolam 2 milliGRAM(s) Oral four times a day PRN Anxiety  benzonatate 100 milliGRAM(s) Oral three times a day PRN Cough  dextrose 40% Gel 15 Gram(s) Oral once PRN Blood Glucose LESS THAN 70 milliGRAM(s)/deciliter  docusate sodium 100 milliGRAM(s) Oral three times a day PRN Constipation  glucagon  Injectable 1 milliGRAM(s) IntraMuscular once PRN Glucose LESS THAN 70 milligrams/deciliter  ondansetron Injectable 4 milliGRAM(s) IV Push every 6 hours PRN Nausea  senna 2 Tablet(s) Oral at bedtime PRN Constipation      FAMILY HISTORY:  Family history of early CAD (Father)      SOCIAL HISTORY: lives ath ome     REVIEW OF SYSTEM:  NICOLE, cough, chest tightness, otherwise 12 point ROS negative     Vital Signs Last 24 Hrs  T(C): 36.3 (2018 03:02), Max: 37.7 (2018 15:25)  T(F): 97.4 (2018 03:02), Max: 99.8 (2018 15:25)  HR: 71 (2018 03:02) (71 - 86)  BP: 107/80 (2018 03:02) (107/80 - 150/101)  BP(mean): --  RR: 22 (2018 22:48) (22 - 24)  SpO2: 100% (2018 03:02) (87% - 100%)    I&O's Summary    PHYSICAL EXAM  General Appearance: cooperative, no acute distress,   HEENT: PERRL, conjunctiva clear, EOM's intact, non injected pharynx, no exudate, TM   normal  Neck: Supple, , no adenopathy, thyroid: not enlarged, no carotid bruit or JVD  Back: Symmetric, no  tenderness,no soft tissue tenderness  Lungs: Clear to auscultation bilateral,no adventitious breath sounds, normal   expiratory phase  Heart: Regular rate and rhythm, S1, S2 normal, no murmur, rub or gallop  Abdomen: Soft, non-tender, bowel sounds active , no hepatosplenomegaly  Extremities: no cyanosis or edema, no joint swelling  Skin: Skin color, texture normal, no rashes   Neurologic: Alert and oriented X3 , cranial nerves intact, sensory and motor normal,    ECG:    LABS:                          11.5   3.13  )-----------( 175      ( 2018 05:54 )             36.7     1113    140  |  95<L>  |  6<L>  ----------------------------<  284<H>  2.3<LL>   |  38<H>  |  0.68    Ca    7.7<L>      2018 05:54  Phos  2.7       Mg     1.7         TPro  6.3  /  Alb  2.6<L>  /  TBili  0.6  /  DBili  x   /  AST  27  /  ALT  27  /  AlkPhos  87      CARDIAC MARKERS ( 2018 03:19 )  0.025 ng/mL / x     / x     / x     / x      CARDIAC MARKERS ( 2018 15:32 )  0.037 ng/mL / x     / x     / x     / x            Pro BNP  5068  @ 15:32  D Dimer  --  @ 15:32    PT/INR - ( 2018 15:32 )   PT: 16.9 sec;   INR: 1.50 ratio         PTT - ( 2018 15:32 )  PTT:30.2 sec  Urinalysis Basic - ( 2018 17:16 )    Color: Yellow / Appearance: Clear / S.005 / pH: x  Gluc: x / Ketone: Negative  / Bili: Negative / Urobili: Negative mg/dL   Blood: x / Protein: Negative mg/dL / Nitrite: Negative   Leuk Esterase: Negative / RBC: x / WBC x   Sq Epi: x / Non Sq Epi: x / Bacteria: x      ABG - ( 2018 06:08 )  pH, Arterial: 7.40  pH, Blood: x     /  pCO2: 63    /  pO2: Unable to calc Instrument unable to calculate PO2A.  A-Gas Repeated on both instruments and also repeated using the capillary  tube. Confirmed there were no air bubbles and mixed the specimen well  before running on the instrument.  Informed RN Elizabeth Hoang in 3E.  18 06:31 / HCO3: 39    / Base Excess: 12.0  /  SaO2: 96                    RADIOLOGY & ADDITIONAL STUDIES:

## 2018-11-15 NOTE — PROGRESS NOTE ADULT - SUBJECTIVE AND OBJECTIVE BOX
59 y/o F PMHx significant for hypertension, bipolar disorder, anxiety, ADHD, and diabetes mellitus type 2, who was referred to the ED by her PCP for further evaluation of a 1 week hx of progressive generalized weakness/malaise, subjective fevers, shortness of breath/hypoxia (SaO2 81%), and associated cough. The patient was initially evaluated at a local urgent care center earlier last week (11/6) and was prescribed a course of Azithromycin which she completed as prescribed without improvement prompting her second visit (11/10) to the local urgent care center where she was prescribed a 10day regimen. The patient's symptoms progressed further prompting her visit to her PCP today (11/12). The patient was evaluated/co-managed w/ Pulmonology who referred the patient to the ED. In the ED the patient was found to be hypoxic SaO2 87%. Labs=> Na 134, K 2.5, HCO3 37, , pro-BNP 5068, ABG 7.46/50/68/90. RVP (+). CTA Chest => No pulmonary embolism. No infiltrate. Cardiomegaly. Findings suggesting pulmonary hypertension. Mosaic attenuation of the lungs suggesting air trapping. (12 Nov 2018 21:33). Found to be hypoxemic at primary care MD's office. No chest pain. No orthopnea. She has moderate NICOLE.     Today, she feels well. No new complaints. Breathing is better.     CURRENT CARDIAC WORKUP:       Echo:  11/13/18 Nml EF, trace MR, Moderate TR, moderate PAH, RVH      Allergies:   celery allergy (Faint)  No Known Drug Allergies      MEDICATIONS  (STANDING):  ARIPiprazole 15 milliGRAM(s) Oral daily  citalopram 40 milliGRAM(s) Oral daily  dextrose 5%. 1000 milliLiter(s) (50 mL/Hr) IV Continuous <Continuous>  dextrose 50% Injectable 12.5 Gram(s) IV Push once  dextrose 50% Injectable 25 Gram(s) IV Push once  dextrose 50% Injectable 25 Gram(s) IV Push once  insulin lispro (HumaLOG) corrective regimen sliding scale   SubCutaneous three times a day before meals  losartan 100 milliGRAM(s) Oral daily  oseltamivir 75 milliGRAM(s) Oral two times a day    MEDICATIONS  (PRN):  acetaminophen   Tablet .. 650 milliGRAM(s) Oral every 6 hours PRN Temp greater or equal to 38C (100.4F), Mild Pain (1 - 3)  ALPRAZolam 2 milliGRAM(s) Oral four times a day PRN Anxiety  benzonatate 100 milliGRAM(s) Oral three times a day PRN Cough  dextrose 40% Gel 15 Gram(s) Oral once PRN Blood Glucose LESS THAN 70 milliGRAM(s)/deciliter  docusate sodium 100 milliGRAM(s) Oral three times a day PRN Constipation  glucagon  Injectable 1 milliGRAM(s) IntraMuscular once PRN Glucose LESS THAN 70 milligrams/deciliter  ondansetron Injectable 4 milliGRAM(s) IV Push every 6 hours PRN Nausea  senna 2 Tablet(s) Oral at bedtime PRN Constipation      ROS:     Detailed ten system ROS was performed and was negative except for history as eluded to above.    no fever  no chills  no nausea  no vomiting  no diarrhea  no constipation  no melena  no hematochezia  no chest pain  no palpitations  no sob at rest  + dyspnea on exertion  + cough  no wheezing  no anorexia  no headache  no dizziness  no syncope  no weakness  no myalgia  no dysuria  no polyuria  no hematuria       Vital Signs Last 24 Hrs  T(C): 37.1 (15 Nov 2018 11:28), Max: 37.1 (15 Nov 2018 11:28)  T(F): 98.7 (15 Nov 2018 11:28), Max: 98.7 (15 Nov 2018 11:28)  HR: 58 (15 Nov 2018 11:28) (58 - 74)  BP: 159/96 (15 Nov 2018 11:28) (147/96 - 159/96)  BP(mean): --  RR: 18 (15 Nov 2018 11:28) (18 - 18)  SpO2: 96% (15 Nov 2018 11:28) (96% - 98%)    I&O's Summary      PHYSICAL EXAM:    General:                Comfortable, AAO X 3, in no distress. Obese  HEENT:                  Atraumatic, PERRLA, EOMI, conjunctiva clear.   Neck:                     Supple, no adenopathy, no thyromegaly, no JVD, no bruit.  Back:                     Symmetric, non tender.  Chest:                    Clear, B/L symmetric air entry, no tachypnea  Heart:                     S1, S2 normal, no gallop, no murmur.  Abdomen:              Soft, non-tender, bowel sounds active. No palpable masses.  Extremities:           no cyanosis, no edema. Peripheral pulses normal.  Skin:                      Skin color, texture normal. No rashes.  Neurologic:            Grossly nonfocal.       TELEMETRY:  Normal sinus rhythm with no tachy or scott events       LABS:                        11.9   3.91  )-----------( 165      ( 15 Nov 2018 05:41 )             38.9     11-15    141  |  97  |  7   ----------------------------<  295<H>  3.4<L>   |  36<H>  |  0.60    Ca    7.9<L>      15 Nov 2018 05:41      Pro BNP  986 11-14 @ 05:49    Pro BNP  5068 11-12 @ 15:32    RADIOLOGY & ADDITIONAL STUDIES:    VQ scan: 11/14/18  negative.

## 2018-11-16 LAB
GLUCOSE BLDC GLUCOMTR-MCNC: 267 MG/DL — HIGH (ref 70–99)
GLUCOSE BLDC GLUCOMTR-MCNC: 269 MG/DL — HIGH (ref 70–99)
GLUCOSE BLDC GLUCOMTR-MCNC: 290 MG/DL — HIGH (ref 70–99)

## 2018-11-16 RX ORDER — SPIRONOLACTONE 25 MG/1
25 TABLET, FILM COATED ORAL DAILY
Qty: 0 | Refills: 0 | Status: DISCONTINUED | OUTPATIENT
Start: 2018-11-16 | End: 2018-11-18

## 2018-11-16 RX ADMIN — Medication 75 MILLIGRAM(S): at 05:44

## 2018-11-16 RX ADMIN — Medication 650 MILLIGRAM(S): at 05:49

## 2018-11-16 RX ADMIN — Medication 3: at 08:19

## 2018-11-16 RX ADMIN — Medication 3: at 17:38

## 2018-11-16 RX ADMIN — CITALOPRAM 40 MILLIGRAM(S): 10 TABLET, FILM COATED ORAL at 12:22

## 2018-11-16 RX ADMIN — Medication 2 MILLIGRAM(S): at 05:45

## 2018-11-16 RX ADMIN — ARIPIPRAZOLE 15 MILLIGRAM(S): 15 TABLET ORAL at 12:22

## 2018-11-16 RX ADMIN — Medication 3: at 12:21

## 2018-11-16 RX ADMIN — Medication 100 MILLIGRAM(S): at 16:29

## 2018-11-16 RX ADMIN — Medication 75 MILLIGRAM(S): at 17:39

## 2018-11-16 RX ADMIN — Medication 100 MILLIGRAM(S): at 05:44

## 2018-11-16 RX ADMIN — Medication 2 MILLIGRAM(S): at 12:27

## 2018-11-16 RX ADMIN — SPIRONOLACTONE 25 MILLIGRAM(S): 25 TABLET, FILM COATED ORAL at 16:28

## 2018-11-16 RX ADMIN — LOSARTAN POTASSIUM 100 MILLIGRAM(S): 100 TABLET, FILM COATED ORAL at 05:44

## 2018-11-16 RX ADMIN — Medication 650 MILLIGRAM(S): at 16:29

## 2018-11-16 NOTE — DISCHARGE NOTE ADULT - CARE PLAN
Principal Discharge DX:	Acute respiratory failure with hypoxia  Goal:	prevent further admission  Assessment and plan of treatment:	follow up with PMD  Follow up with Dr Lopez for sleep study Principal Discharge DX:	Acute respiratory failure with hypoxia  Goal:	prevent further admission  Assessment and plan of treatment:	follow up with PMD  Follow up with Dr Lopez for sleep study  She can go back to work on 11/26/18.

## 2018-11-16 NOTE — PROGRESS NOTE ADULT - SUBJECTIVE AND OBJECTIVE BOX
59 y/o F PMHx significant for hypertension, bipolar disorder, anxiety, ADHD, and diabetes mellitus type 2, who was referred to the ED by her PCP for further evaluation of a 1 week hx of progressive generalized weakness/malaise, subjective fevers, shortness of breath/hypoxia (SaO2 81%), and associated cough. The patient was initially evaluated at a local urgent care center earlier last week (11/6) and was prescribed a course of Azithromycin which she completed as prescribed without improvement prompting her second visit (11/10) to the local urgent care center where she was prescribed a 10day regimen. The patient's symptoms progressed further prompting her visit to her PCP today (11/12). The patient was evaluated/co-managed w/ Pulmonology who referred the patient to the ED. In the ED the patient was found to be hypoxic SaO2 87%. Labs=> Na 134, K 2.5, HCO3 37, , pro-BNP 5068, ABG 7.46/50/68/90. RVP (+). CTA Chest => No pulmonary embolism. No infiltrate. Cardiomegaly. Findings suggesting pulmonary hypertension. Mosaic attenuation of the lungs suggesting air trapping. (12 Nov 2018 21:33). Found to be hypoxemic at primary care MD's office. No chest pain. No orthopnea. She has moderate NICOLE.     Today, no new events.    CURRENT CARDIAC WORKUP:       Echo:  11/13/18 Nml EF, trace MR, Moderate TR, moderate PAH, RVH    Allergies:   celery allergy (Faint)  No Known Drug Allergies      MEDICATIONS  (STANDING):  ARIPiprazole 15 milliGRAM(s) Oral daily  citalopram 40 milliGRAM(s) Oral daily  dextrose 5%. 1000 milliLiter(s) (50 mL/Hr) IV Continuous <Continuous>  dextrose 50% Injectable 12.5 Gram(s) IV Push once  dextrose 50% Injectable 25 Gram(s) IV Push once  dextrose 50% Injectable 25 Gram(s) IV Push once  insulin lispro (HumaLOG) corrective regimen sliding scale   SubCutaneous three times a day before meals  losartan 100 milliGRAM(s) Oral daily  oseltamivir 75 milliGRAM(s) Oral two times a day    MEDICATIONS  (PRN):  acetaminophen   Tablet .. 650 milliGRAM(s) Oral every 6 hours PRN Temp greater or equal to 38C (100.4F), Mild Pain (1 - 3)  ALPRAZolam 2 milliGRAM(s) Oral four times a day PRN Anxiety  benzonatate 100 milliGRAM(s) Oral three times a day PRN Cough  dextrose 40% Gel 15 Gram(s) Oral once PRN Blood Glucose LESS THAN 70 milliGRAM(s)/deciliter  docusate sodium 100 milliGRAM(s) Oral three times a day PRN Constipation  glucagon  Injectable 1 milliGRAM(s) IntraMuscular once PRN Glucose LESS THAN 70 milligrams/deciliter  ondansetron Injectable 4 milliGRAM(s) IV Push every 6 hours PRN Nausea  senna 2 Tablet(s) Oral at bedtime PRN Constipation      ROS:     Detailed ten system ROS was performed and was negative except for history as eluded to above.    no fever  no chills  no nausea  no vomiting  no diarrhea  no constipation  no melena  no hematochezia  no chest pain  no palpitations  no sob at rest  + dyspnea on exertion  +cough  no wheezing  no anorexia  no headache  no dizziness  no syncope  no weakness  no myalgia  no dysuria  no polyuria  no hematuria       Vital Signs Last 24 Hrs  T(C): 36.9 (16 Nov 2018 04:38), Max: 37.1 (15 Nov 2018 11:28)  T(F): 98.4 (16 Nov 2018 04:38), Max: 98.7 (15 Nov 2018 11:28)  HR: 73 (16 Nov 2018 04:38) (57 - 73)  BP: 131/79 (16 Nov 2018 04:38) (131/79 - 168/108)  RR: 18 (15 Nov 2018 11:28) (18 - 18)  SpO2: 96% (16 Nov 2018 04:38) (96% - 98%)    I&O's Summary      PHYSICAL EXAM:    General:                Comfortable, AAO X 3, in no distress. Obese  HEENT:                  Atraumatic, PERRLA, EOMI, conjunctiva clear.   Neck:                     Supple, no adenopathy, no thyromegaly, no JVD, no bruit.  Back:                     Symmetric, non tender.  Chest:                    Clear, B/L symmetric air entry, no tachypnea  Heart:                     S1, S2 normal, no gallop, no murmur.  Abdomen:              Soft, non-tender, bowel sounds active. No palpable masses.  Extremities:           no cyanosis, no edema. Peripheral pulses normal.  Skin:                      Skin color, texture normal. No rashes.  Neurologic:            Grossly nonfocal.       TELEMETRY:  Normal sinus rhythm with no tachy or scott events       LABS:                        11.9   3.91  )-----------( 165      ( 15 Nov 2018 05:41 )             38.9     11-15    141  |  97  |  7   ----------------------------<  295<H>  3.4<L>   |  36<H>  |  0.60    Ca    7.9<L>      15 Nov 2018 05:41        Pro BNP  986 11-14 @ 05:49    Pro BNP  5068 11-12 @ 15:32        RADIOLOGY & ADDITIONAL STUDIES:    NM Pulmonary Ventilation/Perfusion Scan (11.14.18 @ 13:57) >  IMPRESSION:  Very low probability of pulmonary embolus.

## 2018-11-16 NOTE — PROGRESS NOTE ADULT - SUBJECTIVE AND OBJECTIVE BOX
Patient is a 58y old  Female who presents with a chief complaint of Cough, shortness of breath (2018 21:33)      HPI:  57 y/o F PMHx significant for hypertension, bipolar disorder, anxiety, ADHD, and diabetes mellitus type 2, who was referred to the ED by her PCP for further evaluation of a 1 week hx of progressive generalized weakness/malaise, subjective fevers, shortness of breath/hypoxia (SaO2 81%), and associated cough. The patient was initially evaluated at a local urgent care center earlier last week () and was prescribed a course of Azithromycin which she completed as prescribed without improvement prompting her second visit (11/10) to the local urgent care center where she was prescribed a 10day regimen. The patient's symptoms progressed further prompting her visit to her PCP today (). The patient was evaluated/co-managed w/ Pulmonology who referred the patient to the ED. In the ED the patient was found to be hypoxic SaO2 87%. Labs=> Na 134, K 2.5, HCO3 37, , pro-BNP 5068, ABG 7.46/50/68/90. RVP (+). CTA Chest => No pulmonary embolism. No infiltrate. Cardiomegaly. Findings suggesting pulmonary hypertension. Mosaic attenuation of the lungs suggesting air trapping. (2018 21:33)    11/15/18  Patient underwent a V/Q  Low probability  Dyspnea is improving     18  No acute pulmonary events occurred overnight     PAST MEDICAL & SURGICAL HISTORY:  ADHD (attention deficit hyperactivity disorder)  COLLEEN (obstructive sleep apnea)  Hypertension  Diabetes mellitus: Type 2  Bipolar 1 disorder  Gastric bypass status for obesity   delivery delivered: x2      PREVIOUS DIAGNOSTIC TESTING:      MEDICATIONS  (STANDING):  ARIPiprazole 15 milliGRAM(s) Oral daily  citalopram 40 milliGRAM(s) Oral daily  dextrose 5%. 1000 milliLiter(s) (50 mL/Hr) IV Continuous <Continuous>  dextrose 50% Injectable 12.5 Gram(s) IV Push once  dextrose 50% Injectable 25 Gram(s) IV Push once  dextrose 50% Injectable 25 Gram(s) IV Push once  insulin lispro (HumaLOG) corrective regimen sliding scale   SubCutaneous three times a day before meals  losartan 100 milliGRAM(s) Oral daily  oseltamivir 75 milliGRAM(s) Oral two times a day    MEDICATIONS  (PRN):  acetaminophen   Tablet .. 650 milliGRAM(s) Oral every 6 hours PRN Temp greater or equal to 38C (100.4F), Mild Pain (1 - 3)  ALPRAZolam 2 milliGRAM(s) Oral four times a day PRN Anxiety  benzonatate 100 milliGRAM(s) Oral three times a day PRN Cough  dextrose 40% Gel 15 Gram(s) Oral once PRN Blood Glucose LESS THAN 70 milliGRAM(s)/deciliter  docusate sodium 100 milliGRAM(s) Oral three times a day PRN Constipation  glucagon  Injectable 1 milliGRAM(s) IntraMuscular once PRN Glucose LESS THAN 70 milligrams/deciliter  ondansetron Injectable 4 milliGRAM(s) IV Push every 6 hours PRN Nausea  senna 2 Tablet(s) Oral at bedtime PRN Constipation        FAMILY HISTORY:  Family history of early CAD (Father)      SOCIAL HISTORY: lives ath [facility] [place]     REVIEW OF SYSTEM:  NICOLE, cough, chest tightness, otherwise 12 point ROS negative     Vital Signs Last 24 Hrs  T(C): 36.9 (2018 04:38), Max: 37.1 (15 Nov 2018 11:28)  T(F): 98.4 (2018 04:38), Max: 98.7 (15 Nov 2018 11:28)  HR: 73 (2018 04:38) (57 - 73)  BP: 131/79 (2018 04:38) (131/79 - 168/108)  BP(mean): --  RR: 18 (15 Nov 2018 11:28) (18 - 18)  SpO2: 96% (2018 04:38) (96% - 98%)    I&O's Summary    PHYSICAL EXAM  General Appearance: cooperative, no acute distress,   HEENT: PERRL, conjunctiva clear, EOM's intact, non injected pharynx, no exudate, TM   normal  Neck: Supple, , no adenopathy, thyroid: not enlarged, no carotid bruit or JVD  Back: Symmetric, no  tenderness,no soft tissue tenderness  Lungs: Diminished at the bases   Heart: Regular rate and rhythm, S1, S2 normal, no murmur, rub or gallop  Abdomen: Soft, non-tender, bowel sounds active , no hepatosplenomegaly  Extremities: no cyanosis or edema, no joint swelling  Skin: Skin color, texture normal, no rashes   Neurologic: Alert and oriented X3 , cranial nerves intact, sensory and motor normal,    ECG:    LABS:                          11.5   3.13  )-----------( 175      ( 2018 05:54 )             36.7         140  |  95<L>  |  6<L>  ----------------------------<  284<H>  2.3<LL>   |  38<H>  |  0.68    Ca    7.7<L>      2018 05:54  Phos  2.7       Mg     1.7         TPro  6.3  /  Alb  2.6<L>  /  TBili  0.6  /  DBili  x   /  AST  27  /  ALT  27  /  AlkPhos  87      CARDIAC MARKERS ( 2018 03:19 )  0.025 ng/mL / x     / x     / x     / x      CARDIAC MARKERS ( 2018 15:32 )  0.037 ng/mL / x     / x     / x     / x            Pro BNP  5068  @ 15:32  D Dimer  --  @ 15:32    PT/INR - ( 2018 15:32 )   PT: 16.9 sec;   INR: 1.50 ratio         PTT - ( 2018 15:32 )  PTT:30.2 sec  Urinalysis Basic - ( 2018 17:16 )    Color: Yellow / Appearance: Clear / S.005 / pH: x  Gluc: x / Ketone: Negative  / Bili: Negative / Urobili: Negative mg/dL   Blood: x / Protein: Negative mg/dL / Nitrite: Negative   Leuk Esterase: Negative / RBC: x / WBC x   Sq Epi: x / Non Sq Epi: x / Bacteria: x      ABG - ( 2018 06:08 )  pH, Arterial: 7.40  pH, Blood: x     /  pCO2: 63    /  pO2: Unable to calc Instrument unable to calculate PO2A.  A-Gas Repeated on both instruments and also repeated using the capillary  tube. Confirmed there were no air bubbles and mixed the specimen well  before running on the instrument.  Informed ASHKAN Hoang in 3E.  18 06:31 / HCO3: 39    / Base Excess: 12.0  /  SaO2: 96                    RADIOLOGY & ADDITIONAL STUDIES:

## 2018-11-16 NOTE — PROGRESS NOTE ADULT - ASSESSMENT
1) Hypoxemic Respiratory Failure with Hypercapnia  2) Dyspnea  3) +Influenza B  4) Abnormal CT Chest  5) Bronchiolitis  6) Right Ventricular Enlargement  7) Enlarged Pulmonary Artery   8) Uncontrolled Diabetes   9) Electrolyte Abnormalities   10) Pulmonary Hypertension    59 y/o F PMHx significant for hypertension, bipolar disorder, anxiety, ADHD, and diabetes mellitus type 2, who was referred to the ED by her PCP for further evaluation of a 1 week hx of progressive generalized weakness/malaise, subjective fevers, shortness of breath/hypoxia (SaO2 81%), and associated cough.  Patient was seen emergently in the office as a STAT Pulmonary consult and patient was noted to be persistently hypoxemic without improvement in hyperventilation.   The thought was possible OHS but patient needed to be ruled out for a PE.  CT Chest reviewed, shows enlarged pulmonary artery with enlarged RV, no pulmonary embolism.  Mosaic attenuation also noted that could be suggestive of small airway disease or small vessel disease (chronic thromboembolic pulmonary hypertension).  Patient has chronic hypercapnic respiratory failure, agree with BiPAP 10/5, will add nocturnal back up rate of 15.   Agree with holding diuretics, electrolyte repletion in light of abnormalities  Echocardiogram reviewed and shows findings suggestive of right ventricular overload/pulmonary hypertension  V/Q Scan is low probability, CTEPH ruled out.  Appreciate Cardiology recommendations - likely will receive outpatient cardiac cath  Continue Tamiflu  Will need outpatient BiPAP (patient states she is already on this but has not seen a sleep specialist/pulmonologist for a long time)  Nocturnal O2 as an outpatient  Will continue to monitor  Thank you for the consult

## 2018-11-16 NOTE — PROGRESS NOTE ADULT - SUBJECTIVE AND OBJECTIVE BOX
57 y/o F PMHx significant for hypertension, bipolar disorder, anxiety, ADHD, and diabetes mellitus type 2, who was referred to the ED by her PCP for further evaluation of a 1 week hx of progressive generalized weakness/malaise, subjective fevers, shortness of breath/hypoxia (SaO2 81%), and associated cough. The patient was initially evaluated at a local urgent care center earlier last week (11/6) and was prescribed a course of Azithromycin which she completed as prescribed without improvement prompting her second visit (11/10) to the local urgent care center where she was prescribed a 10day regimen. The patient's symptoms progressed further prompting her visit to her PCP today (11/12). The patient was evaluated/co-managed w/ Pulmonology who referred the patient to the ED. In the ED the patient was found to be hypoxic SaO2 87%. Labs=> Na 134, K 2.5, HCO3 37, , pro-BNP 5068, ABG 7.46/50/68/90. RVP (+). CTA Chest => No pulmonary embolism. No infiltrate. Cardiomegaly. Findings suggesting pulmonary hypertension. Mosaic attenuation of the lungs suggesting air trapping.    11/13: Pt breathing improving, weaned to nasal cannula. CT without consolidations.  Will continue supportive care for acute influenza B.     11/14: Pt feeling better, breathing improved.  Spoke to her about her psych meds, states she receives them from her psychiatrist.  She declined to meet with our inpatient psychiatrist for med management.  Pt currently being treated for flu.  I also spoke to her  via phone for verification of her meds.  He states he does not really know her meds but is concerned for her polypharmacy.    11/15: Spoke to pt at length regarding her diagnosis of pulmonary hypertension and her need for ongoing outpatient pulmonary and cardiac follow up.  We also spoke about her psych meds and she is comfortable with my discontinuation of many of her home meds.  Her mood is good on celexa, abilify and xanax PRN and is comfortable with this regimen moving forward.  I spoke to her daughter/HCP earlier about her living situation with her .  She clarified to me that nobody is to talk to him about her medical issues.  I acknowledged this and also verified this with the pt.  Currently pt is comfortable and offers no complaints.  11/16/18: Patient seen and examined. Feels better today. No new complaints.     REVIEW OF SYSTEMS: All other review of systems is negative unless indicated above.    Vital Signs Last 24 Hrs  T(C): 36.9 (16 Nov 2018 04:38), Max: 37.1 (15 Nov 2018 16:53)  T(F): 98.4 (16 Nov 2018 04:38), Max: 98.7 (15 Nov 2018 16:53)  HR: 73 (16 Nov 2018 04:38) (57 - 73)  BP: 131/79 (16 Nov 2018 04:38) (131/79 - 168/108)  BP(mean): --  RR: --  SpO2: 96% (16 Nov 2018 04:38) (96% - 98%)        PHYSICAL EXAM:    Constitutional: NAD, obese  HEENT: PERR, EOMI, Normal Hearing, MMM  Neck: Soft and supple  Respiratory: Breath sounds diminished  Cardiovascular: S1 and S2, regular rate and rhythm, no Murmurs, gallops or rubs  Gastrointestinal: Bowel Sounds present, soft, nontender, nondistended, no guarding, no rebound  Extremities: No peripheral edema  Neurological: A/O x 3, no focal deficits in my limited exam  Skin: No rashes    MEDICATIONS:    ARIPiprazole 15 milliGRAM(s) Oral daily  citalopram 40 milliGRAM(s) Oral daily  dextrose 5%. 1000 milliLiter(s) (50 mL/Hr) IV Continuous <Continuous>  dextrose 50% Injectable 12.5 Gram(s) IV Push once  dextrose 50% Injectable 25 Gram(s) IV Push once  dextrose 50% Injectable 25 Gram(s) IV Push once  insulin lispro (HumaLOG) corrective regimen sliding scale   SubCutaneous three times a day before meals  losartan 100 milliGRAM(s) Oral daily  oseltamivir 75 milliGRAM(s) Oral two times a day    MEDICATIONS  (PRN):  acetaminophen   Tablet .. 650 milliGRAM(s) Oral every 6 hours PRN Temp greater or equal to 38C (100.4F), Mild Pain (1 - 3)  ALPRAZolam 2 milliGRAM(s) Oral four times a day PRN Anxiety  benzonatate 100 milliGRAM(s) Oral three times a day PRN Cough  dextrose 40% Gel 15 Gram(s) Oral once PRN Blood Glucose LESS THAN 70 milliGRAM(s)/deciliter  docusate sodium 100 milliGRAM(s) Oral three times a day PRN Constipation  glucagon  Injectable 1 milliGRAM(s) IntraMuscular once PRN Glucose LESS THAN 70 milligrams/deciliter  ondansetron Injectable 4 milliGRAM(s) IV Push every 6 hours PRN Nausea  senna 2 Tablet(s) Oral at bedtime PRN Constipation      Labs:                                   11.9   3.91  )-----------( 165      ( 15 Nov 2018 05:41 )             38.9     15 Nov 2018 05:41    141    |  97     |  7      ----------------------------<  295    3.4     |  36     |  0.60     Ca    7.9        15 Nov 2018 05:41          CAPILLARY BLOOD GLUCOSE      POCT Blood Glucose.: 267 mg/dL (16 Nov 2018 12:10)  POCT Blood Glucose.: 290 mg/dL (16 Nov 2018 07:56)  POCT Blood Glucose.: 310 mg/dL (15 Nov 2018 18:29)          Assessment and Plan:  57 y/o F PMHx significant for hypertension, bipolar disorder, anxiety, ADHD, and diabetes mellitus type 2, who was referred to the ED by her PCP for further evaluation of a 1 week hx of progressive generalized weakness/malaise, subjective fevers, shortness of breath/hypoxia (SaO2 81%), and associated cough, found to have acute respiratory failure secondary to acute influenza B infection.    Sepsis with Acute Influenza B complicated by Acute Hypoxic Respiratory Failure:  -cultures NGTD  -s/p bipap now on nasal canula  -repeat ABG  improved  -pulm f/u appreciated, V/Q scan low probability PE.  -cont. Oseltamivir  -CT chest without consolidations, or thrombosis  --> d/c IV abx.   -Check O2 sats on ambulation    Pulmonary hypertension/cardiomegaly:  -Elevated BNP  -echo --> EF 55%, moderate pulmonary HTN  -trops neg x 2  -cardio eval appreciated --> Ischemic evaluation with stress, right heart cath when influenza and respiratory status improves as outpatient with Dr. Gipson.     Hypertension  -cont. Losartan 100mg po daily    Bipolar disorder/Anxiety/Depression w/ polypharmacy:   -c/w abilify, and citalopram and discharge on these meds.     Diabetes mellitus type 2  -FS qAC/HS  -A1c 9.2, likely psych meds contributing to hyperglycemia   -cont. ISS per protocol    Hypokalemia  -replaced, follow    obesity s/p gastric bypass, probable COLLEEN:  -use to be on home CPAP, f/u outpatient pulmonary.    Vte ppx  ~cont. SCDs for now    Dispo:  -Continue treatment for tamiflu.  Per pt and daughter/HCP wishes, DO NOT speak to  concerning any medical information, all information should be raised only with pt and daughter/HCP moving forward.

## 2018-11-16 NOTE — DISCHARGE NOTE ADULT - MEDICATION SUMMARY - MEDICATIONS TO STOP TAKING
I will STOP taking the medications listed below when I get home from the hospital:    risperiDONE 2 mg oral tablet  -- 1 tab(s) by mouth 2 times a day    Effexor XR 75 mg oral capsule, extended release  -- 3 cap(s) by mouth once a day    OLANZapine 5 mg oral tablet, disintegrating  -- 1 tab(s) by mouth once a day (at bedtime)    atomoxetine 100 mg oral capsule  -- 1 cap(s) by mouth once a day (in the morning)    levoFLOXacin 500 mg oral tablet  -- 1 tab(s) by mouth every 24 hours      ***Course Not Completed****    Azithromycin 5 Day Dose Pack 250 mg oral tablet  --       ***Course Completed*** I will STOP taking the medications listed below when I get home from the hospital:    risperiDONE 2 mg oral tablet  -- 1 tab(s) by mouth 2 times a day    Effexor XR 75 mg oral capsule, extended release  -- 3 cap(s) by mouth once a day    OLANZapine 5 mg oral tablet, disintegrating  -- 1 tab(s) by mouth once a day (at bedtime)    atomoxetine 100 mg oral capsule  -- 1 cap(s) by mouth once a day (in the morning)    levoFLOXacin 500 mg oral tablet  -- 1 tab(s) by mouth every 24 hours      ***Course Not Completed****    Azithromycin 5 Day Dose Pack 250 mg oral tablet  --       ***Course Completed***    Fiber Tabs 625 mg oral tablet  -- 1 tab(s) by mouth once a day

## 2018-11-16 NOTE — DISCHARGE NOTE ADULT - PLAN OF CARE
prevent further admission follow up with PMD  Follow up with Dr Lopez for sleep study follow up with PMD  Follow up with Dr Lopez for sleep study  She can go back to work on 11/26/18.

## 2018-11-16 NOTE — DISCHARGE NOTE ADULT - HOSPITAL COURSE
59 y/o F PMHx significant for hypertension, bipolar disorder, anxiety, ADHD, and diabetes mellitus type 2, who was referred to the ED by her PCP for further evaluation of a 1 week hx of progressive generalized weakness/malaise, subjective fevers, shortness of breath/hypoxia (SaO2 81%), and associated cough. The patient was initially evaluated at a local urgent care center earlier last week (11/6) and was prescribed a course of Azithromycin which she completed as prescribed without improvement prompting her second visit (11/10) to the local urgent care center where she was prescribed a 10day regimen. The patient's symptoms progressed further prompting her visit to her PCP today (11/12). The patient was evaluated/co-managed w/ Pulmonology who referred the patient to the ED. In the ED the patient was found to be hypoxic SaO2 87%. Labs=> Na 134, K 2.5, HCO3 37, , pro-BNP 5068, ABG 7.46/50/68/90. RVP (+). CTA Chest => No pulmonary embolism. No infiltrate. Cardiomegaly. Findings suggesting pulmonary hypertension. Mosaic attenuation of the lungs suggesting air trapping.    11/13: Pt breathing improving, weaned to nasal cannula. CT without consolidations.  Will continue supportive care for acute influenza B.     11/14: Pt feeling better, breathing improved.  Spoke to her about her psych meds, states she receives them from her psychiatrist.  She declined to meet with our inpatient psychiatrist for med management.  Pt currently being treated for flu.  I also spoke to her  via phone for verification of her meds.  He states he does not really know her meds but is concerned for her polypharmacy.    11/15: Spoke to pt at length regarding her diagnosis of pulmonary hypertension and her need for ongoing outpatient pulmonary and cardiac follow up.  We also spoke about her psych meds and she is comfortable with my discontinuation of many of her home meds.  Her mood is good on celexa, abilify and xanax PRN and is comfortable with this regimen moving forward.  I spoke to her daughter/HCP earlier about her living situation with her .  She clarified to me that nobody is to talk to him about her medical issues.  I acknowledged this and also verified this with the pt.  Currently pt is comfortable and offers no complaints.  11/16/18: Patient seen and examined. Feels better today. No new complaints.   11/17/18: Patient seen and examined. Feels much better today. Wants to go home.         PHYSICAL EXAM:    Constitutional: NAD, obese  HEENT: PERR, EOMI, Normal Hearing, MMM  Neck: Soft and supple  Respiratory: Breath sounds diminished  Cardiovascular: S1 and S2, regular rate and rhythm, no Murmurs, gallops or rubs  Gastrointestinal: Bowel Sounds present, soft, nontender, nondistended, no guarding, no rebound  Extremities: No peripheral edema  Neurological: A/O x 3, no focal deficits in my limited exam  Skin: No rashes        Assessment and Plan:  59 y/o F PMHx significant for hypertension, bipolar disorder, anxiety, ADHD, and diabetes mellitus type 2, who was referred to the ED by her PCP for further evaluation of a 1 week hx of progressive generalized weakness/malaise, subjective fevers, shortness of breath/hypoxia (SaO2 81%), and associated cough, found to have acute respiratory failure secondary to acute influenza B infection.    Sepsis with Acute Influenza B complicated by Acute Hypoxic Respiratory Failure:  -cultures NGTD  -s/p bipap now on nasal canula  -repeat ABG  improved  -pulm f/u appreciated, V/Q scan low probability PE.  -On Oseltamivir  -CT chest without consolidations, or thrombosis  --> d/c IV abx.     Pulmonary hypertension/cardiomegaly:  -Elevated BNP  -echo --> EF 55%, moderate pulmonary HTN  -trops neg x 2  -cardio eval appreciated --> Ischemic evaluation with stress, right heart cath when influenza and respiratory status improves as outpatient with Dr. Gipson.     Hypertension  -cont. Losartan 100mg po daily    Bipolar disorder/Anxiety/Depression w/ polypharmacy:   -c/w abilify, and citalopram and discharge on these meds.     Diabetes mellitus type 2  -FS qAC/HS  -A1c 9.2, likely psych meds contributing to hyperglycemia   -cont. ISS per protocol    Hypokalemia  -replaced,    obesity s/p gastric bypass, probable COLLEEN:  -use to be on home CPAP, f/u outpatient pulmonary.      O2 requirement: Patient O2 sats dropped to 85% on RA on ambulation. On 2L NC O2 sats 92%. Patient with chr stable CHF and pulmonary HTN. 59 y/o F PMHx significant for hypertension, bipolar disorder, anxiety, ADHD, and diabetes mellitus type 2, who was referred to the ED by her PCP for further evaluation of a 1 week hx of progressive generalized weakness/malaise, subjective fevers, shortness of breath/hypoxia (SaO2 81%), and associated cough. The patient was initially evaluated at a local urgent care center earlier last week (11/6) and was prescribed a course of Azithromycin which she completed as prescribed without improvement prompting her second visit (11/10) to the local urgent care center where she was prescribed a 10day regimen. The patient's symptoms progressed further prompting her visit to her PCP today (11/12). The patient was evaluated/co-managed w/ Pulmonology who referred the patient to the ED.     Hospital course:     11/13: Pt breathing improving, weaned to nasal cannula. CT without consolidations.  Will continue supportive care for acute influenza B.     11/14: Pt feeling better, breathing improved.  Spoke to her about her psych meds, states she receives them from her psychiatrist.  She declined to meet with our inpatient psychiatrist for med management.  Pt currently being treated for flu.  I also spoke to her  via phone for verification of her meds.  He states he does not really know her meds but is concerned for her polypharmacy.    11/15: Spoke to pt at length regarding her diagnosis of pulmonary hypertension and her need for ongoing outpatient pulmonary and cardiac follow up.  We also spoke about her psych meds and she is comfortable with my discontinuation of many of her home meds.  Her mood is good on celexa, abilify and xanax PRN and is comfortable with this regimen moving forward.  I spoke to her daughter/HCP earlier about her living situation with her .  She clarified to me that nobody is to talk to him about her medical issues.  I acknowledged this and also verified this with the pt.  Currently pt is comfortable and offers no complaints.  11/16/18: Patient seen and examined. Feels better today. No new complaints.   11/17/18: Patient seen and examined. Feels much better today. Wants to go home.   11/18/18: Patient seen and examined. waiting for home O2.      Vital Signs Last 24 Hrs  T(C): 36.6 (18 Nov 2018 11:00), Max: 36.7 (17 Nov 2018 17:48)  T(F): 97.8 (18 Nov 2018 11:00), Max: 98 (17 Nov 2018 17:48)  HR: 52 (18 Nov 2018 11:00) (47 - 63)  BP: 180/95 (18 Nov 2018 11:00) (158/80 - 180/95)  BP(mean): --  RR: 18 (18 Nov 2018 11:00) (18 - 18)  SpO2: 96% (18 Nov 2018 11:00) (96% - 98%)        PHYSICAL EXAM:    Constitutional: NAD, obese  HEENT: PERR, EOMI, Normal Hearing, MMM  Neck: Soft and supple  Respiratory: Breath sounds diminished  Cardiovascular: S1 and S2, regular rate and rhythm, no Murmurs, gallops or rubs  Gastrointestinal: Bowel Sounds present, soft, nontender, nondistended, no guarding, no rebound  Extremities: No peripheral edema  Neurological: A/O x 3, no focal deficits in my limited exam          Assessment and Plan:      Sepsis with Acute Influenza B complicated by Acute Hypoxic Respiratory Failure:  -s/p bipap now on nasal canula  -V/Q scan low probability PE.  -Completed Oseltamivir  -CT chest without consolidations, or thrombosis      Pulmonary hypertension/cardiomegaly:  -cardio eval appreciated --> Ischemic evaluation with stress, right heart cath when influenza and respiratory status improves as outpatient with Dr. Gipson.     Hypertension  -cont. Losartan     Bipolar disorder/Anxiety/Depression w/ polypharmacy:   -c/w abilify, and citalopram and discharge on these meds.     Diabetes mellitus type 2  -FS qAC/HS  -A1c 9.2, likely psych meds contributing to hyperglycemia   -cont. ISS per protocol    Hypokalemia  -replaced,    obesity s/p gastric bypass, probable COLLEEN:  -use to be on home CPAP, f/u outpatient pulmonary.      O2 requirement: Patient O2 sats dropped to 85% on RA on ambulation. On 2L NC O2 sats 92%. Patient with chr stable CHF and pulmonary HTN.     D/C home once home O2 available  PMD was notified  Spent more than 30 minutes to prepare the discharge.

## 2018-11-16 NOTE — DISCHARGE NOTE ADULT - CARE PROVIDER_API CALL
Jak Castillo), Hematology; Internal Medicine  [address] [place] [address]  Phone: [phone]  Fax: [phone]    Henry Lopez), Internal Medicine  [address] [place] [address]  Phone: [phone]  Fax: [phone]

## 2018-11-16 NOTE — DISCHARGE NOTE ADULT - PATIENT PORTAL LINK FT
You can access the [facility] Patient Portal, offered by [facility], by registering with the following website: [email or web address]

## 2018-11-16 NOTE — DISCHARGE NOTE ADULT - MEDICATION SUMMARY - MEDICATIONS TO TAKE
I will START or STAY ON the medications listed below when I get home from the hospital:    spironolactone 25 mg oral tablet  -- 1 tab(s) by mouth once a day  -- Indication: For Acute congestive heart failure, unspecified heart failure type    losartan 100 mg oral tablet  -- 1 tab(s) by mouth once a day  -- Indication: For Hypertension    citalopram 20 mg oral tablet  -- 1 tab(s) by mouth 2 times a day  -- Indication: For Bipolar 1 disorder    glimepiride 4 mg oral tablet  -- 2 tab(s) by mouth once a day  -- Indication: For Diabetes mellitus    metFORMIN 1000 mg oral tablet  -- 1 tab(s) by mouth 2 times a day  -- Indication: For Diabetes mellitus    pioglitazone 45 mg oral tablet  -- 1 tab(s) by mouth once a day  -- Indication: For Diabetes mellitus    triamterene-hydrochlorothiazide 37.5mg-25mg oral capsule  -- 1 cap(s) by mouth once a day  -- Indication: For Hypertension    ARIPiprazole 15 mg oral tablet  -- 1 tab(s) by mouth once a day  -- Indication: For Bipolar 1 disorder    Xanax 2 mg oral tablet  -- 1 tab(s) by mouth 4 times a day  -- Indication: For Bipolar 1 disorder    Fiber Tabs 625 mg oral tablet  -- 1 tab(s) by mouth once a day  -- Indication: For constipation    cyanocobalamin 500 mcg oral tablet  -- 1 tab(s) by mouth once a day  -- Indication: For supplement I will START or STAY ON the medications listed below when I get home from the hospital:    spironolactone 25 mg oral tablet  -- 1 tab(s) by mouth once a day  -- Indication: For Acute respiratory failure with hypoxia    losartan 100 mg oral tablet  -- 1 tab(s) by mouth once a day  -- Indication: For Hypertension    citalopram 20 mg oral tablet  -- 1 tab(s) by mouth 2 times a day  -- Indication: For Bipolar 1 disorder    glimepiride 4 mg oral tablet  -- 2 tab(s) by mouth once a day  -- Indication: For Diabetes mellitus    metFORMIN 1000 mg oral tablet  -- 1 tab(s) by mouth 2 times a day  -- Indication: For Diabetes mellitus    pioglitazone 45 mg oral tablet  -- 1 tab(s) by mouth once a day  -- Indication: For Diabetes mellitus    triamterene-hydrochlorothiazide 37.5mg-25mg oral capsule  -- 1 cap(s) by mouth once a day  -- Indication: For Hypertension    ARIPiprazole 15 mg oral tablet  -- 1 tab(s) by mouth once a day  -- Indication: For Bipolar 1 disorder    Xanax 2 mg oral tablet  -- 1 tab(s) by mouth 4 times a day  -- Indication: For Bipolar 1 disorder    Fiber Tabs 625 mg oral tablet  -- 1 tab(s) by mouth once a day  -- Indication: For GI    cyanocobalamin 500 mcg oral tablet  -- 1 tab(s) by mouth once a day  -- Indication: For supplement

## 2018-11-17 LAB
ANION GAP SERPL CALC-SCNC: 6 MMOL/L — SIGNIFICANT CHANGE UP (ref 5–17)
BUN SERPL-MCNC: 9 MG/DL — SIGNIFICANT CHANGE UP (ref 7–23)
CALCIUM SERPL-MCNC: 8.1 MG/DL — LOW (ref 8.5–10.1)
CHLORIDE SERPL-SCNC: 98 MMOL/L — SIGNIFICANT CHANGE UP (ref 96–108)
CO2 SERPL-SCNC: 34 MMOL/L — HIGH (ref 22–31)
CREAT SERPL-MCNC: 0.64 MG/DL — SIGNIFICANT CHANGE UP (ref 0.5–1.3)
CULTURE RESULTS: SIGNIFICANT CHANGE UP
CULTURE RESULTS: SIGNIFICANT CHANGE UP
GLUCOSE BLDC GLUCOMTR-MCNC: 266 MG/DL — HIGH (ref 70–99)
GLUCOSE BLDC GLUCOMTR-MCNC: 281 MG/DL — HIGH (ref 70–99)
GLUCOSE BLDC GLUCOMTR-MCNC: 288 MG/DL — HIGH (ref 70–99)
GLUCOSE BLDC GLUCOMTR-MCNC: 330 MG/DL — HIGH (ref 70–99)
GLUCOSE SERPL-MCNC: 318 MG/DL — HIGH (ref 70–99)
HCT VFR BLD CALC: 38.3 % — SIGNIFICANT CHANGE UP (ref 34.5–45)
HGB BLD-MCNC: 12 G/DL — SIGNIFICANT CHANGE UP (ref 11.5–15.5)
MCHC RBC-ENTMCNC: 28.6 PG — SIGNIFICANT CHANGE UP (ref 27–34)
MCHC RBC-ENTMCNC: 31.3 GM/DL — LOW (ref 32–36)
MCV RBC AUTO: 91.2 FL — SIGNIFICANT CHANGE UP (ref 80–100)
NRBC # BLD: 0 /100 WBCS — SIGNIFICANT CHANGE UP (ref 0–0)
PLATELET # BLD AUTO: 159 K/UL — SIGNIFICANT CHANGE UP (ref 150–400)
POTASSIUM SERPL-MCNC: 3.1 MMOL/L — LOW (ref 3.5–5.3)
POTASSIUM SERPL-SCNC: 3.1 MMOL/L — LOW (ref 3.5–5.3)
RBC # BLD: 4.2 M/UL — SIGNIFICANT CHANGE UP (ref 3.8–5.2)
RBC # FLD: 14.6 % — HIGH (ref 10.3–14.5)
SODIUM SERPL-SCNC: 138 MMOL/L — SIGNIFICANT CHANGE UP (ref 135–145)
SPECIMEN SOURCE: SIGNIFICANT CHANGE UP
SPECIMEN SOURCE: SIGNIFICANT CHANGE UP
WBC # BLD: 3.99 K/UL — SIGNIFICANT CHANGE UP (ref 3.8–10.5)
WBC # FLD AUTO: 3.99 K/UL — SIGNIFICANT CHANGE UP (ref 3.8–10.5)

## 2018-11-17 RX ORDER — ATOMOXETINE HYDROCHLORIDE 10 MG/1
1 CAPSULE ORAL
Qty: 0 | Refills: 0 | COMMUNITY

## 2018-11-17 RX ORDER — RISPERIDONE 4 MG/1
1 TABLET ORAL
Qty: 0 | Refills: 0 | COMMUNITY

## 2018-11-17 RX ORDER — OLANZAPINE 15 MG/1
1 TABLET, FILM COATED ORAL
Qty: 0 | Refills: 0 | COMMUNITY

## 2018-11-17 RX ORDER — VENLAFAXINE HCL 75 MG
3 CAPSULE, EXT RELEASE 24 HR ORAL
Qty: 0 | Refills: 0 | COMMUNITY

## 2018-11-17 RX ORDER — SPIRONOLACTONE 25 MG/1
1 TABLET, FILM COATED ORAL
Qty: 30 | Refills: 0
Start: 2018-11-17 | End: 2018-12-16

## 2018-11-17 RX ORDER — AZITHROMYCIN 500 MG/1
0 TABLET, FILM COATED ORAL
Qty: 0 | Refills: 0 | COMMUNITY

## 2018-11-17 RX ADMIN — Medication 75 MILLIGRAM(S): at 17:54

## 2018-11-17 RX ADMIN — Medication 650 MILLIGRAM(S): at 17:56

## 2018-11-17 RX ADMIN — Medication 2 MILLIGRAM(S): at 05:22

## 2018-11-17 RX ADMIN — Medication 2 MILLIGRAM(S): at 17:54

## 2018-11-17 RX ADMIN — Medication 650 MILLIGRAM(S): at 18:00

## 2018-11-17 RX ADMIN — Medication 650 MILLIGRAM(S): at 00:39

## 2018-11-17 RX ADMIN — Medication 3: at 12:03

## 2018-11-17 RX ADMIN — Medication 650 MILLIGRAM(S): at 06:11

## 2018-11-17 RX ADMIN — SPIRONOLACTONE 25 MILLIGRAM(S): 25 TABLET, FILM COATED ORAL at 05:23

## 2018-11-17 RX ADMIN — Medication 100 MILLIGRAM(S): at 00:26

## 2018-11-17 RX ADMIN — Medication 650 MILLIGRAM(S): at 08:04

## 2018-11-17 RX ADMIN — Medication 100 MILLIGRAM(S): at 06:12

## 2018-11-17 RX ADMIN — LOSARTAN POTASSIUM 100 MILLIGRAM(S): 100 TABLET, FILM COATED ORAL at 05:23

## 2018-11-17 RX ADMIN — Medication 4: at 17:54

## 2018-11-17 RX ADMIN — Medication 3: at 08:06

## 2018-11-17 RX ADMIN — Medication 75 MILLIGRAM(S): at 05:23

## 2018-11-17 RX ADMIN — Medication 2 MILLIGRAM(S): at 12:21

## 2018-11-17 RX ADMIN — ARIPIPRAZOLE 15 MILLIGRAM(S): 15 TABLET ORAL at 12:03

## 2018-11-17 RX ADMIN — Medication 2 MILLIGRAM(S): at 22:42

## 2018-11-17 RX ADMIN — CITALOPRAM 40 MILLIGRAM(S): 10 TABLET, FILM COATED ORAL at 12:03

## 2018-11-17 NOTE — PROGRESS NOTE ADULT - SUBJECTIVE AND OBJECTIVE BOX
Patient is a 58y old  Female who presents with a chief complaint of Cough, shortness of breath (2018 21:33)      HPI:  57 y/o F PMHx significant for hypertension, bipolar disorder, anxiety, ADHD, and diabetes mellitus type 2, who was referred to the ED by her PCP for further evaluation of a 1 week hx of progressive generalized weakness/malaise, subjective fevers, shortness of breath/hypoxia (SaO2 81%), and associated cough. The patient was initially evaluated at a local urgent care center earlier last week () and was prescribed a course of Azithromycin which she completed as prescribed without improvement prompting her second visit (11/10) to the local urgent care center where she was prescribed a 10day regimen. The patient's symptoms progressed further prompting her visit to her PCP today (). The patient was evaluated/co-managed w/ Pulmonology who referred the patient to the ED. In the ED the patient was found to be hypoxic SaO2 87%. Labs=> Na 134, K 2.5, HCO3 37, , pro-BNP 5068, ABG 7.46/50/68/90. RVP (+). CTA Chest => No pulmonary embolism. No infiltrate. Cardiomegaly. Findings suggesting pulmonary hypertension. Mosaic attenuation of the lungs suggesting air trapping. (2018 21:33)    11/15/18  Patient underwent a V/Q  Low probability  Dyspnea is improving     18  No acute pulmonary events occurred overnight       feels better and wants to go home  no distress  overall feels much better    PAST MEDICAL & SURGICAL HISTORY:  ADHD (attention deficit hyperactivity disorder)  COLLEEN (obstructive sleep apnea)  Hypertension  Diabetes mellitus: Type 2  Bipolar 1 disorder  Gastric bypass status for obesity   delivery delivered: x2      PREVIOUS DIAGNOSTIC TESTING:    MEDICATIONS  (STANDING):  ARIPiprazole 15 milliGRAM(s) Oral daily  citalopram 40 milliGRAM(s) Oral daily  dextrose 5%. 1000 milliLiter(s) (50 mL/Hr) IV Continuous <Continuous>  dextrose 50% Injectable 12.5 Gram(s) IV Push once  dextrose 50% Injectable 25 Gram(s) IV Push once  dextrose 50% Injectable 25 Gram(s) IV Push once  insulin lispro (HumaLOG) corrective regimen sliding scale   SubCutaneous three times a day before meals  losartan 100 milliGRAM(s) Oral daily  oseltamivir 75 milliGRAM(s) Oral two times a day  spironolactone 25 milliGRAM(s) Oral daily      MEDICATIONS  (PRN):  acetaminophen   Tablet .. 650 milliGRAM(s) Oral every 6 hours PRN Temp greater or equal to 38C (100.4F), Mild Pain (1 - 3)  ALPRAZolam 2 milliGRAM(s) Oral four times a day PRN Anxiety  benzonatate 100 milliGRAM(s) Oral three times a day PRN Cough  dextrose 40% Gel 15 Gram(s) Oral once PRN Blood Glucose LESS THAN 70 milliGRAM(s)/deciliter  docusate sodium 100 milliGRAM(s) Oral three times a day PRN Constipation  glucagon  Injectable 1 milliGRAM(s) IntraMuscular once PRN Glucose LESS THAN 70 milligrams/deciliter  ondansetron Injectable 4 milliGRAM(s) IV Push every 6 hours PRN Nausea  senna 2 Tablet(s) Oral at bedtime PRN Constipation        FAMILY HISTORY:  Family history of early CAD (Father)      SOCIAL HISTORY: lives ath ome     REVIEW OF SYSTEM:  NICOLE, cough, chest tightness, otherwise 12 point ROS negative     Vital Signs Last 24 Hrs  T(C): 36.7 (2018 04:59), Max: 36.9 (2018 10:22)  T(F): 98 (2018 04:59), Max: 98.5 (2018 10:22)  HR: 65 (2018 04:59) (65 - 112)  BP: 166/90 (2018 04:59) (151/83 - 166/90)  BP(mean): --  RR: 18 (2018 04:59) (18 - 18)  SpO2: 92% (2018 04:59) (92% - 97%)    I&O's Summary    PHYSICAL EXAM  General Appearance: cooperative, no acute distress,   HEENT: PERRL, conjunctiva clear, EOM's intact, non injected pharynx, no exudate, TM   normal  Neck: Supple, , no adenopathy, thyroid: not enlarged, no carotid bruit or JVD  Back: Symmetric, no  tenderness,no soft tissue tenderness  Lungs: Diminished at the bases   Heart: Regular rate and rhythm, S1, S2 normal, no murmur, rub or gallop  Abdomen: Soft, non-tender, bowel sounds active , no hepatosplenomegaly  Extremities: no cyanosis or edema, no joint swelling  Skin: Skin color, texture normal, no rashes   Neurologic: Alert and oriented X3 , cranial nerves intact, sensory and motor normal,    ECG:    LABS:                        12.0   3.99  )-----------( 159      ( 2018 05:51 )             38.3   11-    138  |  98  |  9   ----------------------------<  318<H>  3.1<L>   |  34<H>  |  0.64    Ca    8.1<L>      2018 05:51                            11.5   3.13  )-----------( 175      ( 2018 05:54 )             36.7     11-    140  |  95<L>  |  6<L>  ----------------------------<  284<H>  2.3<LL>   |  38<H>  |  0.68    Ca    7.7<L>      2018 05:54  Phos  2.7     11-  Mg     1.7         TPro  6.3  /  Alb  2.6<L>  /  TBili  0.6  /  DBili  x   /  AST  27  /  ALT  27  /  AlkPhos  87  -13    CARDIAC MARKERS ( 2018 03:19 )  0.025 ng/mL / x     / x     / x     / x      CARDIAC MARKERS ( 2018 15:32 )  0.037 ng/mL / x     / x     / x     / x            Pro BNP  5068  @ 15:32  D Dimer  --  @ 15:32    PT/INR - ( 2018 15:32 )   PT: 16.9 sec;   INR: 1.50 ratio         PTT - ( 2018 15:32 )  PTT:30.2 sec  Urinalysis Basic - ( 2018 17:16 )    Color: Yellow / Appearance: Clear / S.005 / pH: x  Gluc: x / Ketone: Negative  / Bili: Negative / Urobili: Negative mg/dL   Blood: x / Protein: Negative mg/dL / Nitrite: Negative   Leuk Esterase: Negative / RBC: x / WBC x   Sq Epi: x / Non Sq Epi: x / Bacteria: x      ABG - ( 2018 06:08 )  pH, Arterial: 7.40  pH, Blood: x     /  pCO2: 63    /  pO2: Unable to calc Instrument unable to calculate PO2A.  A-Gas Repeated on both instruments and also repeated using the capillary  tube. Confirmed there were no air bubbles and mixed the specimen well  before running on the instrument.  Informed ASHKAN Hoang in 3E.  18 06:31 / HCO3: 39    / Base Excess: 12.0  /  SaO2: 96                < from: Xray Chest 1 View- PORTABLE-Routine (18 @ 12:07) >  PROCEDURE DATE:  2018          INTERPRETATION:  Clinical information: Dyspnea    AP view of the chest from 1129 hours:     COMPARISON:  2018    The heart is enlarged.The lungs are clear. The osseous structures   demonstrate no acute abnormality.         IMPRESSION:    Cardiomegaly. Clear lungs.    < end of copied text >      RADIOLOGY & ADDITIONAL STUDIES:

## 2018-11-17 NOTE — PROGRESS NOTE ADULT - ASSESSMENT
Hypopxemia, OHV, Influenza  Vagal mediated bradycardia.  Hypokalemia  HTN  r/o primary hyperaldosteroni  DM  Possible PAH, sec to COLLEEN    Suggest:    Vagal mediated bradycardia. No symptoms. Avoid negative chronotropic agents.  Continue current care and pulm treatment.   Echo shows normal LV function. moderate PAH  Supplement K  Added Aldactone. May increase dose to 50 mg daily.  Continue current cardiac treatment.   Add Norvasc at low dose if BP needs further improvement.  BNP is high, can be sec to COLLEEN, PAH.   Eventually will get ischemia evaluation with stress when influenza and respiratory status improves - planning as out pt.

## 2018-11-17 NOTE — PROGRESS NOTE ADULT - ASSESSMENT
1) Hypoxemic Respiratory Failure with Hypercapnia  2) Dyspnea  3) +Influenza B  4) Abnormal CT Chest  5) Bronchiolitis  6) Right Ventricular Enlargement  7) Enlarged Pulmonary Artery   8) Uncontrolled Diabetes   9) Electrolyte Abnormalities   10) Pulmonary Hypertension    57 y/o F PMHx significant for hypertension, bipolar disorder, anxiety, ADHD, and diabetes mellitus type 2, who was referred to the ED by her PCP for further evaluation of a 1 week hx of progressive generalized weakness/malaise, subjective fevers, shortness of breath/hypoxia (SaO2 81%), and associated cough.  Patient was seen emergently in the office as a STAT Pulmonary consult and patient was noted to be persistently hypoxemic without improvement in hyperventilation.   The thought was possible OHS but patient needed to be ruled out for a PE.  CT Chest reviewed, shows enlarged pulmonary artery with enlarged RV, no pulmonary embolism.  Mosaic attenuation also noted that could be suggestive of small airway disease or small vessel disease (chronic thromboembolic pulmonary hypertension).  Patient has chronic hypercapnic respiratory failure, agree with BiPAP 10/5, will add nocturnal back up rate of 15.   Agree with holding diuretics, electrolyte repletion in light of abnormalities  Echocardiogram reviewed and shows findings suggestive of right ventricular overload/pulmonary hypertension  V/Q Scan is low probability, CTEPH ruled out.  Appreciate Cardiology recommendations - likely will receive outpatient cardiac cath  Continue Tamiflu  Will need outpatient BiPAP (patient states she is already on this but has not seen a sleep specialist/pulmonologist for a long time)  Nocturnal O2 as an outpatient  Will continue to monitor  she is informed regarding indication for repeat sleep testing and will require / resume CPAP or BIPAP treatment as outpt after repeat sleep testing done  she can call [phone] to schedule sleep testing as outpt  Thank you for the consult

## 2018-11-17 NOTE — PROGRESS NOTE ADULT - SUBJECTIVE AND OBJECTIVE BOX
57 y/o F PMHx significant for hypertension, bipolar disorder, anxiety, ADHD, and diabetes mellitus type 2, who was referred to the ED by her PCP for further evaluation of a 1 week hx of progressive generalized weakness/malaise, subjective fevers, shortness of breath/hypoxia (SaO2 81%), and associated cough. The patient was initially evaluated at a local urgent care center earlier last week (11/6) and was prescribed a course of Azithromycin which she completed as prescribed without improvement prompting her second visit (11/10) to the local urgent care center where she was prescribed a 10day regimen. The patient's symptoms progressed further prompting her visit to her PCP today (11/12). The patient was evaluated/co-managed w/ Pulmonology who referred the patient to the ED. In the ED the patient was found to be hypoxic SaO2 87%. Labs=> Na 134, K 2.5, HCO3 37, , pro-BNP 5068, ABG 7.46/50/68/90. RVP (+). CTA Chest => No pulmonary embolism. No infiltrate. Cardiomegaly. Findings suggesting pulmonary hypertension. Mosaic attenuation of the lungs suggesting air trapping. (12 Nov 2018 21:33). Found to be hypoxemic at primary care MD's office. No chest pain. No orthopnea. She has moderate NICOLE.     Today, noted with marked sinus bradycardia. No long pause. No symptoms.      CURRENT CARDIAC WORKUP:       Echo:  11/13/18 Nml EF, trace MR, Moderate TR, moderate PAH, RVH    Allergies:   celery allergy (Faint)  No Known Drug Allergies      MEDICATIONS  (STANDING):  ARIPiprazole 15 milliGRAM(s) Oral daily  citalopram 40 milliGRAM(s) Oral daily  dextrose 5%. 1000 milliLiter(s) (50 mL/Hr) IV Continuous <Continuous>  dextrose 50% Injectable 12.5 Gram(s) IV Push once  dextrose 50% Injectable 25 Gram(s) IV Push once  dextrose 50% Injectable 25 Gram(s) IV Push once  insulin lispro (HumaLOG) corrective regimen sliding scale   SubCutaneous three times a day before meals  losartan 100 milliGRAM(s) Oral daily  oseltamivir 75 milliGRAM(s) Oral two times a day  spironolactone 25 milliGRAM(s) Oral daily    MEDICATIONS  (PRN):  acetaminophen   Tablet .. 650 milliGRAM(s) Oral every 6 hours PRN Temp greater or equal to 38C (100.4F), Mild Pain (1 - 3)  ALPRAZolam 2 milliGRAM(s) Oral four times a day PRN Anxiety  benzonatate 100 milliGRAM(s) Oral three times a day PRN Cough  dextrose 40% Gel 15 Gram(s) Oral once PRN Blood Glucose LESS THAN 70 milliGRAM(s)/deciliter  docusate sodium 100 milliGRAM(s) Oral three times a day PRN Constipation  glucagon  Injectable 1 milliGRAM(s) IntraMuscular once PRN Glucose LESS THAN 70 milligrams/deciliter  guaiFENesin    Syrup 100 milliGRAM(s) Oral every 6 hours PRN Cough  ondansetron Injectable 4 milliGRAM(s) IV Push every 6 hours PRN Nausea  senna 2 Tablet(s) Oral at bedtime PRN Constipation      ROS:     Detailed ten system ROS was performed and was negative except for history as eluded to above.    no fever  no chills  no nausea  no vomiting  no diarrhea  no constipation  no melena  no hematochezia  no chest pain  no palpitations  no sob at rest  + dyspnea on exertion  no cough  no wheezing  no anorexia  no headache  no dizziness  no syncope  no weakness  no myalgia  no dysuria  no polyuria  no hematuria       Vital Signs Last 24 Hrs  T(C): 36.7 (17 Nov 2018 04:59), Max: 36.7 (16 Nov 2018 17:18)  T(F): 98 (17 Nov 2018 04:59), Max: 98.1 (16 Nov 2018 17:18)  HR: 65 (17 Nov 2018 04:59) (65 - 112)  BP: 166/90 (17 Nov 2018 04:59) (159/96 - 166/90)  RR: 18 (17 Nov 2018 04:59) (18 - 18)  SpO2: 92% (17 Nov 2018 04:59) (92% - 97%)    I&O's Summary      PHYSICAL EXAM:    General:                Comfortable, AAO X 3, in no distress. Obese  HEENT:                  Atraumatic, PERRLA, EOMI, conjunctiva clear.   Neck:                     Supple, no adenopathy, no thyromegaly, no JVD, no bruit.  Back:                     Symmetric, non tender.  Chest:                    Clear, B/L symmetric air entry, no tachypnea  Heart:                     S1, S2 normal, no gallop, no murmur.  Abdomen:              Soft, non-tender, bowel sounds active. No palpable masses.  Extremities:           no cyanosis, no edema. Peripheral pulses normal.  Skin:                      Skin color, texture normal. No rashes.  Neurologic:            Grossly nonfocal.       TELEMETRY:  Normal sinus rhythm with intermittent marked sinus bradycardia    LABS:                        12.0   3.99  )-----------( 159      ( 17 Nov 2018 05:51 )             38.3     11-17    138  |  98  |  9   ----------------------------<  318<H>  3.1<L>   |  34<H>  |  0.64    Ca    8.1<L>      17 Nov 2018 05:51      RADIOLOGY & ADDITIONAL STUDIES:

## 2018-11-18 VITALS
TEMPERATURE: 98 F | SYSTOLIC BLOOD PRESSURE: 168 MMHG | HEART RATE: 53 BPM | DIASTOLIC BLOOD PRESSURE: 89 MMHG | OXYGEN SATURATION: 98 %

## 2018-11-18 LAB
ANION GAP SERPL CALC-SCNC: 5 MMOL/L — SIGNIFICANT CHANGE UP (ref 5–17)
BUN SERPL-MCNC: 8 MG/DL — SIGNIFICANT CHANGE UP (ref 7–23)
CALCIUM SERPL-MCNC: 7.9 MG/DL — LOW (ref 8.5–10.1)
CHLORIDE SERPL-SCNC: 98 MMOL/L — SIGNIFICANT CHANGE UP (ref 96–108)
CO2 SERPL-SCNC: 38 MMOL/L — HIGH (ref 22–31)
CREAT SERPL-MCNC: 0.71 MG/DL — SIGNIFICANT CHANGE UP (ref 0.5–1.3)
GLUCOSE BLDC GLUCOMTR-MCNC: 343 MG/DL — HIGH (ref 70–99)
GLUCOSE BLDC GLUCOMTR-MCNC: 379 MG/DL — HIGH (ref 70–99)
GLUCOSE SERPL-MCNC: 322 MG/DL — HIGH (ref 70–99)
POTASSIUM SERPL-MCNC: 3.2 MMOL/L — LOW (ref 3.5–5.3)
POTASSIUM SERPL-SCNC: 3.2 MMOL/L — LOW (ref 3.5–5.3)
SODIUM SERPL-SCNC: 141 MMOL/L — SIGNIFICANT CHANGE UP (ref 135–145)

## 2018-11-18 RX ORDER — SPIRONOLACTONE 25 MG/1
50 TABLET, FILM COATED ORAL DAILY
Qty: 0 | Refills: 0 | Status: DISCONTINUED | OUTPATIENT
Start: 2018-11-18 | End: 2018-11-18

## 2018-11-18 RX ORDER — POTASSIUM CHLORIDE 20 MEQ
40 PACKET (EA) ORAL EVERY 4 HOURS
Qty: 0 | Refills: 0 | Status: DISCONTINUED | OUTPATIENT
Start: 2018-11-18 | End: 2018-11-18

## 2018-11-18 RX ORDER — AMLODIPINE BESYLATE 2.5 MG/1
5 TABLET ORAL DAILY
Qty: 0 | Refills: 0 | Status: DISCONTINUED | OUTPATIENT
Start: 2018-11-18 | End: 2018-11-18

## 2018-11-18 RX ADMIN — Medication 4: at 12:05

## 2018-11-18 RX ADMIN — Medication 100 MILLIGRAM(S): at 06:33

## 2018-11-18 RX ADMIN — Medication 5: at 08:24

## 2018-11-18 RX ADMIN — Medication 100 MILLIGRAM(S): at 13:34

## 2018-11-18 RX ADMIN — Medication 650 MILLIGRAM(S): at 06:33

## 2018-11-18 RX ADMIN — CITALOPRAM 40 MILLIGRAM(S): 10 TABLET, FILM COATED ORAL at 10:33

## 2018-11-18 RX ADMIN — ARIPIPRAZOLE 15 MILLIGRAM(S): 15 TABLET ORAL at 10:33

## 2018-11-18 RX ADMIN — Medication 2 MILLIGRAM(S): at 06:34

## 2018-11-18 RX ADMIN — Medication 40 MILLIEQUIVALENT(S): at 13:33

## 2018-11-18 RX ADMIN — AMLODIPINE BESYLATE 5 MILLIGRAM(S): 2.5 TABLET ORAL at 10:32

## 2018-11-18 RX ADMIN — SPIRONOLACTONE 50 MILLIGRAM(S): 25 TABLET, FILM COATED ORAL at 10:32

## 2018-11-18 RX ADMIN — SPIRONOLACTONE 25 MILLIGRAM(S): 25 TABLET, FILM COATED ORAL at 06:33

## 2018-11-18 RX ADMIN — LOSARTAN POTASSIUM 100 MILLIGRAM(S): 100 TABLET, FILM COATED ORAL at 06:32

## 2018-11-18 NOTE — PROGRESS NOTE ADULT - ASSESSMENT
Hypopxemia, OHV, Influenza  Possible Vagal mediated bradycardia. Asymptomatic  Hypokalemia  HTN  r/o primary hyperaldosteronism   DM  Possible PAH, sec to COLLEEN    Suggest:    Vagal mediated bradycardia. No symptoms. Avoid negative chronotropic agents. No need for PPM, will get EP input  Continue current care and pulm treatment.   Echo shows normal LV function. moderate PAH  Follow up BMP for electrolytes and renal function. Supplement K PRN  Added Aldactone. May increase dose to 50 mg daily if K still low and BP permitting.  Add Norvasc for BP control.  Continue other current cardiac treatment.   BNP improved with treatment.   Eventually will get ischemia evaluation with stress test as out pt. Hypopxemia, OHV, Influenza  Possible Vagal mediated bradycardia. Asymptomatic  Hypokalemia  HTN  r/o primary hyperaldosteronism   DM  Possible PAH, sec to COLLEEN    Suggest:    Vagal mediated bradycardia. No symptoms. Avoid negative chronotropic agents. No need for PPM, will get EP input  Continue current care and pulm treatment.   Echo shows normal LV function. moderate PAH  Follow up BMP for electrolytes and renal function. Supplement K PRN  Added Aldactone. May increase dose to 50 mg daily if K still low and BP permitting.  Add Norvasc for BP control.  Continue other current cardiac treatment.   BNP improved with treatment.   Eventually will get ischemia evaluation with stress test as out pt.   K Suplement  optimize diabetes control

## 2018-11-18 NOTE — PROGRESS NOTE ADULT - REASON FOR ADMISSION
Cough, shortness of breath

## 2018-11-18 NOTE — PROGRESS NOTE ADULT - SUBJECTIVE AND OBJECTIVE BOX
Patient is a 58y old  Female who presents with a chief complaint of Cough, shortness of breath (2018 21:33)      HPI:  57 y/o F PMHx significant for hypertension, bipolar disorder, anxiety, ADHD, and diabetes mellitus type 2, who was referred to the ED by her PCP for further evaluation of a 1 week hx of progressive generalized weakness/malaise, subjective fevers, shortness of breath/hypoxia (SaO2 81%), and associated cough. The patient was initially evaluated at a local urgent care center earlier last week () and was prescribed a course of Azithromycin which she completed as prescribed without improvement prompting her second visit (11/10) to the local urgent care center where she was prescribed a 10day regimen. The patient's symptoms progressed further prompting her visit to her PCP today (). The patient was evaluated/co-managed w/ Pulmonology who referred the patient to the ED. In the ED the patient was found to be hypoxic SaO2 87%. Labs=> Na 134, K 2.5, HCO3 37, , pro-BNP 5068, ABG 7.46/50/68/90. RVP (+). CTA Chest => No pulmonary embolism. No infiltrate. Cardiomegaly. Findings suggesting pulmonary hypertension. Mosaic attenuation of the lungs suggesting air trapping. (2018 21:33)    11/15/18  Patient underwent a V/Q  Low probability  Dyspnea is improving     18  No acute pulmonary events occurred overnight       feels better and wants to go home  no distress  overall feels much better        asks to go home  no distress    PAST MEDICAL & SURGICAL HISTORY:  ADHD (attention deficit hyperactivity disorder)  COLLEEN (obstructive sleep apnea)  Hypertension  Diabetes mellitus: Type 2  Bipolar 1 disorder  Gastric bypass status for obesity   delivery delivered: x2      PREVIOUS DIAGNOSTIC TESTING:    MEDICATIONS  (STANDING):  ARIPiprazole 15 milliGRAM(s) Oral daily  citalopram 40 milliGRAM(s) Oral daily  dextrose 5%. 1000 milliLiter(s) (50 mL/Hr) IV Continuous <Continuous>  dextrose 50% Injectable 12.5 Gram(s) IV Push once  dextrose 50% Injectable 25 Gram(s) IV Push once  dextrose 50% Injectable 25 Gram(s) IV Push once  insulin lispro (HumaLOG) corrective regimen sliding scale   SubCutaneous three times a day before meals  losartan 100 milliGRAM(s) Oral daily  oseltamivir 75 milliGRAM(s) Oral two times a day  spironolactone 25 milliGRAM(s) Oral daily      MEDICATIONS  (PRN):  acetaminophen   Tablet .. 650 milliGRAM(s) Oral every 6 hours PRN Temp greater or equal to 38C (100.4F), Mild Pain (1 - 3)  ALPRAZolam 2 milliGRAM(s) Oral four times a day PRN Anxiety  benzonatate 100 milliGRAM(s) Oral three times a day PRN Cough  dextrose 40% Gel 15 Gram(s) Oral once PRN Blood Glucose LESS THAN 70 milliGRAM(s)/deciliter  docusate sodium 100 milliGRAM(s) Oral three times a day PRN Constipation  glucagon  Injectable 1 milliGRAM(s) IntraMuscular once PRN Glucose LESS THAN 70 milligrams/deciliter  ondansetron Injectable 4 milliGRAM(s) IV Push every 6 hours PRN Nausea  senna 2 Tablet(s) Oral at bedtime PRN Constipation        FAMILY HISTORY:  Family history of early CAD (Father)      SOCIAL HISTORY: lives ath ome     REVIEW OF SYSTEM:  NICOLE, cough, chest tightness, otherwise 12 point ROS negative     Vital Signs Last 24 Hrs  T(C): 36.3 (2018 05:40), Max: 36.8 (2018 12:15)  T(F): 97.4 (2018 05:40), Max: 98.2 (2018 12:15)  HR: 47 (2018 05:40) (42 - 63)  BP: 161/91 (2018 05:40) (158/80 - 167/79)  BP(mean): --  RR: 18 (2018 17:48) (18 - 18)  SpO2: 96% (2018 05:40) (96% - 99%)  I&O's Summary    PHYSICAL EXAM  General Appearance: cooperative, no acute distress,   HEENT: PERRL, conjunctiva clear, EOM's intact, non injected pharynx, no exudate, TM   normal  Neck: Supple, , no adenopathy, thyroid: not enlarged, no carotid bruit or JVD  Back: Symmetric, no  tenderness,no soft tissue tenderness  Lungs: Diminished at the bases   Heart: Regular rate and rhythm, S1, S2 normal, no murmur, rub or gallop  Abdomen: Soft, non-tender, bowel sounds active , no hepatosplenomegaly  Extremities: no cyanosis or edema, no joint swelling  Skin: Skin color, texture normal, no rashes   Neurologic: Alert and oriented X3 , cranial nerves intact, sensory and motor normal,    EC.0   3.99  )-----------( 159      ( 2018 05:51 )             38.3     LABS:                        12.0   3.99  )-----------( 159      ( 2018 05:51 )             38.3   11-    138  |  98  |  9   ----------------------------<  318<H>  3.1<L>   |  34<H>  |  0.64    Ca    8.1<L>      2018 05:51                            11.5   3.13  )-----------( 175      ( 2018 05:54 )             36.7         140  |  95<L>  |  6<L>  ----------------------------<  284<H>  2.3<LL>   |  38<H>  |  0.68    Ca    7.7<L>      2018 05:54  Phos  2.7       Mg     1.7         TPro  6.3  /  Alb  2.6<L>  /  TBili  0.6  /  DBili  x   /  AST  27  /  ALT  27  /  AlkPhos  87      CARDIAC MARKERS ( 2018 03:19 )  0.025 ng/mL / x     / x     / x     / x      CARDIAC MARKERS ( 2018 15:32 )  0.037 ng/mL / x     / x     / x     / x            Pro BNP  5068  @ 15:32  D Dimer  --  @ 15:32    PT/INR - ( 2018 15:32 )   PT: 16.9 sec;   INR: 1.50 ratio         PTT - ( 2018 15:32 )  PTT:30.2 sec  Urinalysis Basic - ( 2018 17:16 )    Color: Yellow / Appearance: Clear / S.005 / pH: x  Gluc: x / Ketone: Negative  / Bili: Negative / Urobili: Negative mg/dL   Blood: x / Protein: Negative mg/dL / Nitrite: Negative   Leuk Esterase: Negative / RBC: x / WBC x   Sq Epi: x / Non Sq Epi: x / Bacteria: x      ABG - ( 2018 06:08 )  pH, Arterial: 7.40  pH, Blood: x     /  pCO2: 63    /  pO2: Unable to calc Instrument unable to calculate PO2A.  A-Gas Repeated on both instruments and also repeated using the capillary  tube. Confirmed there were no air bubbles and mixed the specimen well  before running on the instrument.  Informed ASHKAN Hoang in 3E.  18 06:31 / HCO3: 39    / Base Excess: 12.0  /  SaO2: 96                < from: Xray Chest 1 View- PORTABLE-Routine (18 @ 12:07) >  PROCEDURE DATE:  2018          INTERPRETATION:  Clinical information: Dyspnea    AP view of the chest from 1129 hours:     COMPARISON:  2018    The heart is enlarged.The lungs are clear. The osseous structures   demonstrate no acute abnormality.         IMPRESSION:    Cardiomegaly. Clear lungs.    < end of copied text >      RADIOLOGY & ADDITIONAL STUDIES:

## 2018-11-18 NOTE — PROGRESS NOTE ADULT - ASSESSMENT
1) Hypoxemic Respiratory Failure with Hypercapnia  2) Dyspnea  3) +Influenza B  4) Abnormal CT Chest  5) Bronchiolitis  6) Right Ventricular Enlargement  7) Enlarged Pulmonary Artery   8) Uncontrolled Diabetes   9) Electrolyte Abnormalities   10) Pulmonary Hypertension    59 y/o F PMHx significant for hypertension, bipolar disorder, anxiety, ADHD, and diabetes mellitus type 2, who was referred to the ED by her PCP for further evaluation of a 1 week hx of progressive generalized weakness/malaise, subjective fevers, shortness of breath/hypoxia (SaO2 81%), and associated cough.  Patient was seen emergently in the office as a STAT Pulmonary consult and patient was noted to be persistently hypoxemic without improvement in hyperventilation.   The thought was possible OHS but patient needed to be ruled out for a PE.  CT Chest reviewed, shows enlarged pulmonary artery with enlarged RV, no pulmonary embolism.  Mosaic attenuation also noted that could be suggestive of small airway disease or small vessel disease (chronic thromboembolic pulmonary hypertension).  Patient has chronic hypercapnic respiratory failure, agree with BiPAP 10/5, will add nocturnal back up rate of 15.   Agree with holding diuretics, electrolyte repletion in light of abnormalities  Echocardiogram reviewed and shows findings suggestive of right ventricular overload/pulmonary hypertension  V/Q Scan is low probability, CTEPH ruled out.  Appreciate Cardiology recommendations - likely will receive outpatient cardiac cath  Continue Tamiflu  Will need outpatient BiPAP (patient states she is already on this but has not seen a sleep specialist/pulmonologist for a long time)  Nocturnal O2 as an outpatient  Will continue to monitor  she is informed regarding indication for repeat sleep testing and will require / resume CPAP or BIPAP treatment as outpt after repeat sleep testing done  she can call [phone] to schedule sleep testing as outpt  Thank you for the consult

## 2018-11-18 NOTE — PROGRESS NOTE ADULT - SUBJECTIVE AND OBJECTIVE BOX
57 y/o F PMHx significant for hypertension, bipolar disorder, anxiety, ADHD, and diabetes mellitus type 2, who was referred to the ED by her PCP for further evaluation of a 1 week hx of progressive generalized weakness/malaise, subjective fevers, shortness of breath/hypoxia (SaO2 81%), and associated cough. The patient was initially evaluated at a local urgent care center earlier last week (11/6) and was prescribed a course of Azithromycin which she completed as prescribed without improvement prompting her second visit (11/10) to the local urgent care center where she was prescribed a 10day regimen. The patient's symptoms progressed further prompting her visit to her PCP today (11/12). The patient was evaluated/co-managed w/ Pulmonology who referred the patient to the ED. In the ED the patient was found to be hypoxic SaO2 87%. Labs=> Na 134, K 2.5, HCO3 37, , pro-BNP 5068, ABG 7.46/50/68/90. RVP (+). CTA Chest => No pulmonary embolism. No infiltrate. Cardiomegaly. Findings suggesting pulmonary hypertension. Mosaic attenuation of the lungs suggesting air trapping. (12 Nov 2018 21:33). Found to be hypoxemic at primary care MD's office. No chest pain. No orthopnea. She has moderate NICOLE.     Today, sinus bradycardia persists. No long pause. No symptoms. Mostly during sleep or resting.      CURRENT CARDIAC WORKUP:       Echo:  11/13/18 Nml EF, trace MR, Moderate TR, moderate PAH, RVH      Allergies:   celery allergy (Faint)  No Known Drug Allergies      MEDICATIONS  (STANDING):  ARIPiprazole 15 milliGRAM(s) Oral daily  citalopram 40 milliGRAM(s) Oral daily  dextrose 5%. 1000 milliLiter(s) (50 mL/Hr) IV Continuous <Continuous>  dextrose 50% Injectable 12.5 Gram(s) IV Push once  dextrose 50% Injectable 25 Gram(s) IV Push once  dextrose 50% Injectable 25 Gram(s) IV Push once  insulin lispro (HumaLOG) corrective regimen sliding scale   SubCutaneous three times a day before meals  losartan 100 milliGRAM(s) Oral daily  spironolactone 25 milliGRAM(s) Oral daily    MEDICATIONS  (PRN):  acetaminophen   Tablet .. 650 milliGRAM(s) Oral every 6 hours PRN Temp greater or equal to 38C (100.4F), Mild Pain (1 - 3)  ALPRAZolam 2 milliGRAM(s) Oral four times a day PRN Anxiety  benzonatate 100 milliGRAM(s) Oral three times a day PRN Cough  dextrose 40% Gel 15 Gram(s) Oral once PRN Blood Glucose LESS THAN 70 milliGRAM(s)/deciliter  docusate sodium 100 milliGRAM(s) Oral three times a day PRN Constipation  glucagon  Injectable 1 milliGRAM(s) IntraMuscular once PRN Glucose LESS THAN 70 milligrams/deciliter  guaiFENesin    Syrup 100 milliGRAM(s) Oral every 6 hours PRN Cough  ondansetron Injectable 4 milliGRAM(s) IV Push every 6 hours PRN Nausea  senna 2 Tablet(s) Oral at bedtime PRN Constipation      ROS:     Detailed ten system ROS was performed and was negative except for history as eluded to above.    no fever  no chills  no nausea  no vomiting  no diarrhea  no constipation  no melena  no hematochezia  no chest pain  no palpitations  no sob at rest  + dyspnea on exertion  no cough  no wheezing  no anorexia  no headache  no dizziness  no syncope  no weakness  no myalgia  no dysuria  no polyuria  no hematuria       Vital Signs Last 24 Hrs  T(C): 36.3 (18 Nov 2018 05:40), Max: 36.8 (17 Nov 2018 12:15)  T(F): 97.4 (18 Nov 2018 05:40), Max: 98.2 (17 Nov 2018 12:15)  HR: 47 (18 Nov 2018 05:40) (42 - 63)  BP: 161/91 (18 Nov 2018 05:40) (158/80 - 167/79)  BP(mean): --  RR: 18 (17 Nov 2018 17:48) (18 - 18)  SpO2: 96% (18 Nov 2018 05:40) (96% - 99%)    I&O's Summary      PHYSICAL EXAM:    General:                Comfortable, AAO X 3, in no distress. Obese  HEENT:                  Atraumatic, PERRLA, EOMI, conjunctiva clear.   Neck:                     Supple, no adenopathy, no thyromegaly, no JVD, no bruit.  Back:                     Symmetric, non tender.  Chest:                    Clear, B/L symmetric air entry, no tachypnea  Heart:                     S1, S2 normal, no gallop, + murmur.  Abdomen:              Soft, non-tender, bowel sounds active. No palpable masses.  Extremities:           no cyanosis, no edema. Peripheral pulses normal.  Skin:                      Skin color, texture normal. No rashes.  Neurologic:            Grossly nonfocal.       TELEMETRY:  Normal sinus rhythm with intermittent marked sinus bradycardia    LABS:                        12.0   3.99  )-----------( 159      ( 17 Nov 2018 05:51 )             38.3     11-17    138  |  98  |  9   ----------------------------<  318<H>  3.1<L>   |  34<H>  |  0.64    Ca    8.1<L>      17 Nov 2018 05:51 59 y/o F PMHx significant for hypertension, bipolar disorder, anxiety, ADHD, and diabetes mellitus type 2, who was referred to the ED by her PCP for further evaluation of a 1 week hx of progressive generalized weakness/malaise, subjective fevers, shortness of breath/hypoxia (SaO2 81%), and associated cough. The patient was initially evaluated at a local urgent care center earlier last week (11/6) and was prescribed a course of Azithromycin which she completed as prescribed without improvement prompting her second visit (11/10) to the local urgent care center where she was prescribed a 10day regimen. The patient's symptoms progressed further prompting her visit to her PCP today (11/12). The patient was evaluated/co-managed w/ Pulmonology who referred the patient to the ED. In the ED the patient was found to be hypoxic SaO2 87%. Labs=> Na 134, K 2.5, HCO3 37, , pro-BNP 5068, ABG 7.46/50/68/90. RVP (+). CTA Chest => No pulmonary embolism. No infiltrate. Cardiomegaly. Findings suggesting pulmonary hypertension. Mosaic attenuation of the lungs suggesting air trapping. (12 Nov 2018 21:33). Found to be hypoxemic at primary care MD's office. No chest pain. No orthopnea. She has moderate NIOCLE.     Today, sinus bradycardia persists. No long pause. No symptoms. Mostly during sleep or resting. She wants to go home.       CURRENT CARDIAC WORKUP:       Echo:  11/13/18 Nml EF, trace MR, Moderate TR, moderate PAH, RVH      Allergies:   celery allergy (Faint)  No Known Drug Allergies      MEDICATIONS  (STANDING):  ARIPiprazole 15 milliGRAM(s) Oral daily  citalopram 40 milliGRAM(s) Oral daily  dextrose 5%. 1000 milliLiter(s) (50 mL/Hr) IV Continuous <Continuous>  dextrose 50% Injectable 12.5 Gram(s) IV Push once  dextrose 50% Injectable 25 Gram(s) IV Push once  dextrose 50% Injectable 25 Gram(s) IV Push once  insulin lispro (HumaLOG) corrective regimen sliding scale   SubCutaneous three times a day before meals  losartan 100 milliGRAM(s) Oral daily  spironolactone 25 milliGRAM(s) Oral daily    MEDICATIONS  (PRN):  acetaminophen   Tablet .. 650 milliGRAM(s) Oral every 6 hours PRN Temp greater or equal to 38C (100.4F), Mild Pain (1 - 3)  ALPRAZolam 2 milliGRAM(s) Oral four times a day PRN Anxiety  benzonatate 100 milliGRAM(s) Oral three times a day PRN Cough  dextrose 40% Gel 15 Gram(s) Oral once PRN Blood Glucose LESS THAN 70 milliGRAM(s)/deciliter  docusate sodium 100 milliGRAM(s) Oral three times a day PRN Constipation  glucagon  Injectable 1 milliGRAM(s) IntraMuscular once PRN Glucose LESS THAN 70 milligrams/deciliter  guaiFENesin    Syrup 100 milliGRAM(s) Oral every 6 hours PRN Cough  ondansetron Injectable 4 milliGRAM(s) IV Push every 6 hours PRN Nausea  senna 2 Tablet(s) Oral at bedtime PRN Constipation      ROS:     Detailed ten system ROS was performed and was negative except for history as eluded to above.    no fever  no chills  no nausea  no vomiting  no diarrhea  no constipation  no melena  no hematochezia  no chest pain  no palpitations  no sob at rest  + dyspnea on exertion  no cough  no wheezing  no anorexia  no headache  no dizziness  no syncope  no weakness  no myalgia  no dysuria  no polyuria  no hematuria       Vital Signs Last 24 Hrs  T(C): 36.3 (18 Nov 2018 05:40), Max: 36.8 (17 Nov 2018 12:15)  T(F): 97.4 (18 Nov 2018 05:40), Max: 98.2 (17 Nov 2018 12:15)  HR: 47 (18 Nov 2018 05:40) (42 - 63)  BP: 161/91 (18 Nov 2018 05:40) (158/80 - 167/79)  BP(mean): --  RR: 18 (17 Nov 2018 17:48) (18 - 18)  SpO2: 96% (18 Nov 2018 05:40) (96% - 99%)    I&O's Summary      PHYSICAL EXAM:    General:                Comfortable, AAO X 3, in no distress. Obese  HEENT:                  Atraumatic, PERRLA, EOMI, conjunctiva clear.   Neck:                     Supple, no adenopathy, no thyromegaly, no JVD, no bruit.  Back:                     Symmetric, non tender.  Chest:                    Clear, B/L symmetric air entry, no tachypnea  Heart:                     S1, S2 normal, no gallop, + murmur.  Abdomen:              Soft, non-tender, bowel sounds active. No palpable masses.  Extremities:           no cyanosis, no edema. Peripheral pulses normal.  Skin:                      Skin color, texture normal. No rashes.  Neurologic:            Grossly nonfocal.       TELEMETRY:  Normal sinus rhythm with intermittent marked sinus bradycardia    LABS:                        12.0   3.99  )-----------( 159      ( 17 Nov 2018 05:51 )             38.3     11-17    138  |  98  |  9   ----------------------------<  318<H>  3.1<L>   |  34<H>  |  0.64    Ca    8.1<L>      17 Nov 2018 05:51

## 2018-11-18 NOTE — PROGRESS NOTE ADULT - PROVIDER SPECIALTY LIST ADULT
Cardiology
Hospitalist
Infectious Disease
Psychiatry
Pulmonology
Cardiology

## 2018-11-23 DIAGNOSIS — I10 ESSENTIAL (PRIMARY) HYPERTENSION: ICD-10-CM

## 2018-11-23 DIAGNOSIS — I51.7 CARDIOMEGALY: ICD-10-CM

## 2018-11-23 DIAGNOSIS — F41.9 ANXIETY DISORDER, UNSPECIFIED: ICD-10-CM

## 2018-11-23 DIAGNOSIS — A41.9 SEPSIS, UNSPECIFIED ORGANISM: ICD-10-CM

## 2018-11-23 DIAGNOSIS — J96.01 ACUTE RESPIRATORY FAILURE WITH HYPOXIA: ICD-10-CM

## 2018-11-23 DIAGNOSIS — F31.9 BIPOLAR DISORDER, UNSPECIFIED: ICD-10-CM

## 2018-11-23 DIAGNOSIS — F90.9 ATTENTION-DEFICIT HYPERACTIVITY DISORDER, UNSPECIFIED TYPE: ICD-10-CM

## 2018-11-23 DIAGNOSIS — I27.20 PULMONARY HYPERTENSION, UNSPECIFIED: ICD-10-CM

## 2018-11-23 DIAGNOSIS — E87.6 HYPOKALEMIA: ICD-10-CM

## 2018-11-23 DIAGNOSIS — E11.9 TYPE 2 DIABETES MELLITUS WITHOUT COMPLICATIONS: ICD-10-CM

## 2018-11-23 DIAGNOSIS — Z98.84 BARIATRIC SURGERY STATUS: ICD-10-CM

## 2018-11-23 DIAGNOSIS — G47.33 OBSTRUCTIVE SLEEP APNEA (ADULT) (PEDIATRIC): ICD-10-CM

## 2018-11-23 DIAGNOSIS — E66.9 OBESITY, UNSPECIFIED: ICD-10-CM

## 2018-11-23 DIAGNOSIS — J11.1 INFLUENZA DUE TO UNIDENTIFIED INFLUENZA VIRUS WITH OTHER RESPIRATORY MANIFESTATIONS: ICD-10-CM

## 2019-10-24 ENCOUNTER — INPATIENT (INPATIENT)
Facility: HOSPITAL | Age: 59
LOS: 2 days | Discharge: HOME | DRG: 189 | End: 2019-10-27
Attending: HOSPITALIST | Admitting: INTERNAL MEDICINE
Payer: COMMERCIAL

## 2019-10-24 VITALS — HEIGHT: 67 IN | WEIGHT: 253.09 LBS

## 2019-10-24 DIAGNOSIS — Z98.84 BARIATRIC SURGERY STATUS: Chronic | ICD-10-CM

## 2019-10-24 DIAGNOSIS — R06.00 DYSPNEA, UNSPECIFIED: ICD-10-CM

## 2019-10-24 DIAGNOSIS — I27.20 PULMONARY HYPERTENSION, UNSPECIFIED: ICD-10-CM

## 2019-10-24 PROBLEM — F90.9 ATTENTION-DEFICIT HYPERACTIVITY DISORDER, UNSPECIFIED TYPE: Chronic | Status: ACTIVE | Noted: 2018-11-13

## 2019-10-24 PROBLEM — G47.33 OBSTRUCTIVE SLEEP APNEA (ADULT) (PEDIATRIC): Chronic | Status: ACTIVE | Noted: 2018-11-13

## 2019-10-24 LAB
ALBUMIN SERPL ELPH-MCNC: 3.7 G/DL — SIGNIFICANT CHANGE UP (ref 3.3–5)
ALP SERPL-CCNC: 74 U/L — SIGNIFICANT CHANGE UP (ref 40–120)
ALT FLD-CCNC: 18 U/L — SIGNIFICANT CHANGE UP (ref 12–78)
ANION GAP SERPL CALC-SCNC: 8 MMOL/L — SIGNIFICANT CHANGE UP (ref 5–17)
AST SERPL-CCNC: 11 U/L — LOW (ref 15–37)
BASOPHILS # BLD AUTO: 0.04 K/UL — SIGNIFICANT CHANGE UP (ref 0–0.2)
BASOPHILS NFR BLD AUTO: 0.6 % — SIGNIFICANT CHANGE UP (ref 0–2)
BILIRUB SERPL-MCNC: 0.4 MG/DL — SIGNIFICANT CHANGE UP (ref 0.2–1.2)
BUN SERPL-MCNC: 22 MG/DL — SIGNIFICANT CHANGE UP (ref 7–23)
CALCIUM SERPL-MCNC: 8.6 MG/DL — SIGNIFICANT CHANGE UP (ref 8.5–10.1)
CHLORIDE SERPL-SCNC: 107 MMOL/L — SIGNIFICANT CHANGE UP (ref 96–108)
CO2 SERPL-SCNC: 23 MMOL/L — SIGNIFICANT CHANGE UP (ref 22–31)
CREAT SERPL-MCNC: 1.12 MG/DL — SIGNIFICANT CHANGE UP (ref 0.5–1.3)
EOSINOPHIL # BLD AUTO: 0.19 K/UL — SIGNIFICANT CHANGE UP (ref 0–0.5)
EOSINOPHIL NFR BLD AUTO: 2.6 % — SIGNIFICANT CHANGE UP (ref 0–6)
GLUCOSE SERPL-MCNC: 173 MG/DL — HIGH (ref 70–99)
HCT VFR BLD CALC: 33.3 % — LOW (ref 34.5–45)
HGB BLD-MCNC: 10.8 G/DL — LOW (ref 11.5–15.5)
IMM GRANULOCYTES NFR BLD AUTO: 0.3 % — SIGNIFICANT CHANGE UP (ref 0–1.5)
LYMPHOCYTES # BLD AUTO: 2.7 K/UL — SIGNIFICANT CHANGE UP (ref 1–3.3)
LYMPHOCYTES # BLD AUTO: 37.6 % — SIGNIFICANT CHANGE UP (ref 13–44)
MAGNESIUM SERPL-MCNC: 1.9 MG/DL — SIGNIFICANT CHANGE UP (ref 1.6–2.6)
MCHC RBC-ENTMCNC: 29.3 PG — SIGNIFICANT CHANGE UP (ref 27–34)
MCHC RBC-ENTMCNC: 32.4 GM/DL — SIGNIFICANT CHANGE UP (ref 32–36)
MCV RBC AUTO: 90.2 FL — SIGNIFICANT CHANGE UP (ref 80–100)
MONOCYTES # BLD AUTO: 0.7 K/UL — SIGNIFICANT CHANGE UP (ref 0–0.9)
MONOCYTES NFR BLD AUTO: 9.7 % — SIGNIFICANT CHANGE UP (ref 2–14)
NEUTROPHILS # BLD AUTO: 3.54 K/UL — SIGNIFICANT CHANGE UP (ref 1.8–7.4)
NEUTROPHILS NFR BLD AUTO: 49.2 % — SIGNIFICANT CHANGE UP (ref 43–77)
NT-PROBNP SERPL-SCNC: 31 PG/ML — SIGNIFICANT CHANGE UP (ref 0–125)
PLATELET # BLD AUTO: 216 K/UL — SIGNIFICANT CHANGE UP (ref 150–400)
POTASSIUM SERPL-MCNC: 4.5 MMOL/L — SIGNIFICANT CHANGE UP (ref 3.5–5.3)
POTASSIUM SERPL-SCNC: 4.5 MMOL/L — SIGNIFICANT CHANGE UP (ref 3.5–5.3)
PROT SERPL-MCNC: 7.2 GM/DL — SIGNIFICANT CHANGE UP (ref 6–8.3)
RBC # BLD: 3.69 M/UL — LOW (ref 3.8–5.2)
RBC # FLD: 12.3 % — SIGNIFICANT CHANGE UP (ref 10.3–14.5)
SODIUM SERPL-SCNC: 138 MMOL/L — SIGNIFICANT CHANGE UP (ref 135–145)
TROPONIN I SERPL-MCNC: <0.015 NG/ML — SIGNIFICANT CHANGE UP (ref 0.01–0.04)
WBC # BLD: 7.19 K/UL — SIGNIFICANT CHANGE UP (ref 3.8–10.5)
WBC # FLD AUTO: 7.19 K/UL — SIGNIFICANT CHANGE UP (ref 3.8–10.5)

## 2019-10-24 PROCEDURE — 86803 HEPATITIS C AB TEST: CPT

## 2019-10-24 PROCEDURE — 83735 ASSAY OF MAGNESIUM: CPT

## 2019-10-24 PROCEDURE — 85025 COMPLETE CBC W/AUTO DIFF WBC: CPT

## 2019-10-24 PROCEDURE — 82728 ASSAY OF FERRITIN: CPT

## 2019-10-24 PROCEDURE — 71045 X-RAY EXAM CHEST 1 VIEW: CPT | Mod: 26

## 2019-10-24 PROCEDURE — 81003 URINALYSIS AUTO W/O SCOPE: CPT

## 2019-10-24 PROCEDURE — 80053 COMPREHEN METABOLIC PANEL: CPT

## 2019-10-24 PROCEDURE — 94640 AIRWAY INHALATION TREATMENT: CPT

## 2019-10-24 PROCEDURE — 36415 COLL VENOUS BLD VENIPUNCTURE: CPT

## 2019-10-24 PROCEDURE — 71275 CT ANGIOGRAPHY CHEST: CPT

## 2019-10-24 PROCEDURE — G0378: CPT

## 2019-10-24 PROCEDURE — 83540 ASSAY OF IRON: CPT

## 2019-10-24 PROCEDURE — 83036 HEMOGLOBIN GLYCOSYLATED A1C: CPT

## 2019-10-24 PROCEDURE — 84466 ASSAY OF TRANSFERRIN: CPT

## 2019-10-24 PROCEDURE — 74176 CT ABD & PELVIS W/O CONTRAST: CPT

## 2019-10-24 PROCEDURE — 93010 ELECTROCARDIOGRAM REPORT: CPT

## 2019-10-24 PROCEDURE — 84484 ASSAY OF TROPONIN QUANT: CPT

## 2019-10-24 PROCEDURE — 83550 IRON BINDING TEST: CPT

## 2019-10-24 PROCEDURE — 71275 CT ANGIOGRAPHY CHEST: CPT | Mod: 26

## 2019-10-24 PROCEDURE — 82962 GLUCOSE BLOOD TEST: CPT

## 2019-10-24 RX ORDER — CALCIUM POLYCARBOPHIL 625 MG/1
1 TABLET, FILM COATED ORAL
Qty: 0 | Refills: 0 | DISCHARGE

## 2019-10-24 RX ORDER — ARIPIPRAZOLE 15 MG/1
1 TABLET ORAL
Qty: 0 | Refills: 0 | DISCHARGE

## 2019-10-24 NOTE — H&P ADULT - ASSESSMENT
60 y/o F PMHx significant for hypertension, bipolar disorder, anxiety, ADHD, and diabetes mellitus type 2, COPD on 5L home O2 was referred to  from her Pulmonologists' s office where she was being evaluated for progressive symptoms of shortness of breath over the last several weeks. The patient was reportedly found to have had symptoms of associated chest pain/pressures and low blood pressure at the time. Labs => Hgb/Hct 10.8/33.3, Glu 173. CTA PE Protocol => Suboptimal contrast bolus. No large central embolus. Prominent central pulmonary arteries suggestive of pulmonary hypertension. Bibasilar dependent atelectasis. Incidental left fluid distention of the stomach not fully evaluated. Follow-up with abdominal CT as clinically indicated. 58 y/o F PMHx significant for hypertension, bipolar disorder, anxiety, ADHD, and diabetes mellitus type 2, COPD on 5L home O2 was referred to  from her Pulmonologists' s office where she was being evaluated for progressive symptoms of shortness of breath over the last several weeks. The patient was reportedly found to have had symptoms of associated chest pain/pressures and low blood pressure at the time.     #Acute Hypoxemic Respiratory Failure  #Chest pain  ~admit to Telemetry  ~f/u PAN C+S  ~cont. trial BiPAP   ~repeat ABG in the am  ~f/u w/ Pulmonology in the am  ~serial Sendy/EKGs  ~f/u w/ Cardiology in the am    #Hypertension  ~cont. Losartan 100mg po daily    #Bipolar disorder/Anxiety/Depression  ~consider consulting Psychiatry (poly-pharmacy? Note: medications reconciled with ED pharmacist.)  ~takes Aripiprazole 30mg po daily, Risperidone 2mg po bid, Effexor XR 75mg 2tabs po daily, and 1tab po qhs, Citalopram 20mg po bid, and Xanax 2mg po qid.    #ADHD  ~takes Adderall 30 mg po bid    #Diabetes mellitus type 2  ~FS qAC/HS  ~cont. ISS per protocol    #Incidental left fluid distention of the stomach   ~not fully evaluated on CT Chest  ~consider abdominal CT as clinically indicated.    #Vte ppx  IMPROVE VTE Risk Score is 0  [  ] Previous VTE                                                  3  [  ] Thrombophilia                                               2  [  ] Lower limb paralysis                                      2        (unable to hold up >15 seconds)    [  ] Current Cancer                                              2         (within 6 months)  [  ] Immobilization > 24 hrs                                1  [  ] ICU/CCU stay > 24 hours                              1  [  ] Age > 60                                                      1  ~cont. SCDs for now 60 y/o F PMHx significant for hypertension, bipolar disorder, anxiety, ADHD, and diabetes mellitus type 2, COPD on 5L home O2 was referred to  from her Pulmonologists' s office where she was being evaluated for progressive symptoms of shortness of breath over the last several weeks. The patient was reportedly found to have had symptoms of associated chest pain/pressures and low blood pressure at the time.     #Acute Hypoxemic Respiratory Failure  #Chest pain  ~admit to Telemetry  ~f/u PAN C+S  ~cont. trial BiPAP   ~repeat ABG in the am  ~f/u w/ Pulmonology in the am  ~serial Sendy/EKGs  ~f/u w/ Cardiology in the am    #Hypertension  ~cont. Losartan 100mg po daily    #Bipolar disorder/Anxiety/Depression  ~consider consulting Psychiatry (poly-pharmacy? Note: medications reconciled with ED pharmacist.)  ~takes Aripiprazole 30mg po daily, Risperidone 2mg po bid, Effexor XR 75mg 2tabs po daily, and 1tab po qhs, Citalopram 20mg po bid, and Xanax 2mg po qid.    #ADHD  ~takes Adderall 30 mg po bid    #Diabetes mellitus type 2  ~FS qAC/HS  ~cont. ISS per protocol    #Vte ppx  IMPROVE VTE Risk Score is 0  [  ] Previous VTE                                                  3  [  ] Thrombophilia                                               2  [  ] Lower limb paralysis                                      2        (unable to hold up >15 seconds)    [  ] Current Cancer                                              2         (within 6 months)  [  ] Immobilization > 24 hrs                                1  [  ] ICU/CCU stay > 24 hours                              1  [  ] Age > 60                                                      1  ~cont. SCDs for now 58 y/o F PMHx significant for hypertension, bipolar disorder, anxiety, ADHD, and diabetes mellitus type 2, COPD on 5L home O2 was referred to HH from her Pulmonologists' s office where she was being evaluated for progressive symptoms of shortness of breath over the last several weeks. The patient was reportedly found to have had symptoms of associated chest pain/pressures and low blood pressure at the time.     #Acute Hypoxemic Respiratory Failure  #Chest pain  ~admit to Telemetry  ~f/u PAN C+S  ~cont. trial BiPAP   ~repeat ABG in the am  ~f/u w/ Pulmonology in the am  ~serial Sendy/EKGs  ~f/u w/ Cardiology in the am    #Orthostatic Hypertension  ~will hold Losartan 100mg po daily   ~will hold Triamterene/HCTZ for now  ~please reassess further in the am   ~will start IV hydration    #Bipolar disorder/Anxiety/Depression  ~f/u Psychiatry consultation (poly-pharmacy? Note: medications reconciled with ED pharmacist.)  ~takes Aripiprazole 30mg po daily, Risperidone 2mg po bid, Venlafaxine XR 75mg 2tabs po daily, and 1tab po qhs, Citalopram 20mg po bid, and Xanax 2mg po qid.    #ADHD  ~takes Adderall 30 mg po bid    #Diabetes mellitus type 2  ~FS qAC/HS  ~cont. ISS per protocol    #Vte ppx  IMPROVE VTE Risk Score is 0  [  ] Previous VTE                                                  3  [  ] Thrombophilia                                               2  [  ] Lower limb paralysis                                      2        (unable to hold up >15 seconds)    [  ] Current Cancer                                              2         (within 6 months)  [  ] Immobilization > 24 hrs                                1  [  ] ICU/CCU stay > 24 hours                              1  [  ] Age > 60                                                      1  ~cont. SCDs for now

## 2019-10-24 NOTE — H&P ADULT - HISTORY OF PRESENT ILLNESS
60 y/o F PMHx significant for hypertension, bipolar disorder, anxiety, ADHD, and diabetes mellitus type 2, COPD on 5L home O2 was referred to  from her Pulmonologists' s office where she was being evaluated for progressive symptoms of shortness of breath over the last several weeks. The patient was reportedly found to have had symptoms of associated chest pain/pressures and low blood pressure at the time. Labs => Hgb/Hct 10.8/33.3, Glu 173. CTA PE Protocol => Suboptimal contrast bolus. No large central embolus. Prominent central pulmonary arteries suggestive of pulmonary hypertension. Bibasilar dependent atelectasis. Incidental left fluid distention of the stomach not fully evaluated. Follow-up with abdominal CT as clinically indicated.

## 2019-10-24 NOTE — H&P ADULT - NSICDXPASTMEDICALHX_GEN_ALL_CORE_FT
PAST MEDICAL HISTORY:  ADHD (attention deficit hyperactivity disorder)     Anemia     Bipolar 1 disorder     COPD (chronic obstructive pulmonary disease)     Diabetes mellitus Type 2    Hypertension     COLLEEN (obstructive sleep apnea)

## 2019-10-24 NOTE — ED ADULT TRIAGE NOTE - CHIEF COMPLAINT QUOTE
pt aaox4, pt presents to er for sob x few weeks, oxygen dependent copd, wears 5L at home.  sent to main

## 2019-10-24 NOTE — ED PROVIDER NOTE - OBJECTIVE STATEMENT
58 y/o female with a PMHx of anemia, ADHD, bipolar, COPD on 5L home O2, DM, HTN, COLLEEN presents to the ED c/o SOB over the past few weeks. Last hemoglobin 7.9. Pt notes she receives IV iron treatments. Pt was sent to ED by Dr. Lopez for SOB, chest pressure, and hypotension. Denies dark/bloody stools, abd pain, fever, n/v/d. No other complaints at  this time. PCP: Dr. Castillo. Pulmonologist: Dr. Lopez.

## 2019-10-24 NOTE — ED PROVIDER NOTE - SECONDARY DIAGNOSIS.
COPD (chronic obstructive pulmonary disease) Diabetes mellitus COLLEEN (obstructive sleep apnea) Hypertension

## 2019-10-24 NOTE — ED PROVIDER NOTE - CARE PLAN
Principal Discharge DX:	Pulmonary hypertension  Secondary Diagnosis:	COPD (chronic obstructive pulmonary disease)  Secondary Diagnosis:	Diabetes mellitus  Secondary Diagnosis:	COLLEEN (obstructive sleep apnea)  Secondary Diagnosis:	Hypertension

## 2019-10-24 NOTE — ED PROVIDER NOTE - PMH
ADHD (attention deficit hyperactivity disorder)    Anemia    Bipolar 1 disorder    COPD (chronic obstructive pulmonary disease)    Diabetes mellitus  Type 2  Hypertension    COLLEEN (obstructive sleep apnea)

## 2019-10-24 NOTE — ED ADULT NURSE NOTE - OBJECTIVE STATEMENT
pt presents to ED c/o increasing SOV over last few weeks and periods of dizziness. states shes been wearing home O2 during the day now past couple days when she only used to wear it at night. last hgb 7.9 and pt is anemic and recieves iron txs. sent to ED by MD Lopez pulmonologist. pt currently in NAD, not tachypnic, 100% on 3L NC. Hx of COPD, denies smoking hx,. VS WNL otm. RN will ctm

## 2019-10-24 NOTE — H&P ADULT - NSHPPHYSICALEXAM_GEN_ALL_CORE
Vital Signs Last 24 Hrs  T(C): 36.4 (24 Oct 2019 22:30), Max: 36.5 (24 Oct 2019 16:00)  T(F): 97.6 (24 Oct 2019 22:30), Max: 97.7 (24 Oct 2019 16:00)  HR: 76 (24 Oct 2019 22:30) (75 - 89)  BP: 124/71 (24 Oct 2019 22:30) (101/56 - 124/71)  BP(mean): 81 (24 Oct 2019 16:00) (81 - 81)  RR: 18 (24 Oct 2019 22:30) (17 - 18)  SpO2: 100% (24 Oct 2019 22:30) (97% - 100%)

## 2019-10-24 NOTE — ED PROVIDER NOTE - PROGRESS NOTE DETAILS
discussed case with Dr Lopez.  will admit for worsening respiratory status.  states she sometimes retains.  will get ABG, CT and admit

## 2019-10-25 LAB
ALBUMIN SERPL ELPH-MCNC: 3.5 G/DL — SIGNIFICANT CHANGE UP (ref 3.3–5)
ALP SERPL-CCNC: 79 U/L — SIGNIFICANT CHANGE UP (ref 40–120)
ALT FLD-CCNC: 17 U/L — SIGNIFICANT CHANGE UP (ref 12–78)
ANION GAP SERPL CALC-SCNC: 9 MMOL/L — SIGNIFICANT CHANGE UP (ref 5–17)
APPEARANCE UR: CLEAR — SIGNIFICANT CHANGE UP
AST SERPL-CCNC: 8 U/L — LOW (ref 15–37)
BASOPHILS # BLD AUTO: 0.03 K/UL — SIGNIFICANT CHANGE UP (ref 0–0.2)
BASOPHILS NFR BLD AUTO: 0.5 % — SIGNIFICANT CHANGE UP (ref 0–2)
BILIRUB SERPL-MCNC: 0.5 MG/DL — SIGNIFICANT CHANGE UP (ref 0.2–1.2)
BILIRUB UR-MCNC: NEGATIVE — SIGNIFICANT CHANGE UP
BUN SERPL-MCNC: 18 MG/DL — SIGNIFICANT CHANGE UP (ref 7–23)
CALCIUM SERPL-MCNC: 8.6 MG/DL — SIGNIFICANT CHANGE UP (ref 8.5–10.1)
CHLORIDE SERPL-SCNC: 108 MMOL/L — SIGNIFICANT CHANGE UP (ref 96–108)
CO2 SERPL-SCNC: 24 MMOL/L — SIGNIFICANT CHANGE UP (ref 22–31)
COLOR SPEC: YELLOW — SIGNIFICANT CHANGE UP
CREAT SERPL-MCNC: 0.96 MG/DL — SIGNIFICANT CHANGE UP (ref 0.5–1.3)
DIFF PNL FLD: NEGATIVE — SIGNIFICANT CHANGE UP
EOSINOPHIL # BLD AUTO: 0.18 K/UL — SIGNIFICANT CHANGE UP (ref 0–0.5)
EOSINOPHIL NFR BLD AUTO: 3.2 % — SIGNIFICANT CHANGE UP (ref 0–6)
GLUCOSE SERPL-MCNC: 267 MG/DL — HIGH (ref 70–99)
GLUCOSE UR QL: 250 MG/DL
HBA1C BLD-MCNC: 7.5 % — HIGH (ref 4–5.6)
HCT VFR BLD CALC: 33.9 % — LOW (ref 34.5–45)
HCV AB S/CO SERPL IA: 0.16 S/CO — SIGNIFICANT CHANGE UP (ref 0–0.99)
HCV AB SERPL-IMP: SIGNIFICANT CHANGE UP
HGB BLD-MCNC: 10.8 G/DL — LOW (ref 11.5–15.5)
IMM GRANULOCYTES NFR BLD AUTO: 0.2 % — SIGNIFICANT CHANGE UP (ref 0–1.5)
KETONES UR-MCNC: NEGATIVE — SIGNIFICANT CHANGE UP
LEUKOCYTE ESTERASE UR-ACNC: NEGATIVE — SIGNIFICANT CHANGE UP
LYMPHOCYTES # BLD AUTO: 1.94 K/UL — SIGNIFICANT CHANGE UP (ref 1–3.3)
LYMPHOCYTES # BLD AUTO: 34.5 % — SIGNIFICANT CHANGE UP (ref 13–44)
MAGNESIUM SERPL-MCNC: 1.9 MG/DL — SIGNIFICANT CHANGE UP (ref 1.6–2.6)
MCHC RBC-ENTMCNC: 28.7 PG — SIGNIFICANT CHANGE UP (ref 27–34)
MCHC RBC-ENTMCNC: 31.9 GM/DL — LOW (ref 32–36)
MCV RBC AUTO: 90.2 FL — SIGNIFICANT CHANGE UP (ref 80–100)
MONOCYTES # BLD AUTO: 0.47 K/UL — SIGNIFICANT CHANGE UP (ref 0–0.9)
MONOCYTES NFR BLD AUTO: 8.3 % — SIGNIFICANT CHANGE UP (ref 2–14)
NEUTROPHILS # BLD AUTO: 3 K/UL — SIGNIFICANT CHANGE UP (ref 1.8–7.4)
NEUTROPHILS NFR BLD AUTO: 53.3 % — SIGNIFICANT CHANGE UP (ref 43–77)
NITRITE UR-MCNC: NEGATIVE — SIGNIFICANT CHANGE UP
PH UR: 5 — SIGNIFICANT CHANGE UP (ref 5–8)
PLATELET # BLD AUTO: 194 K/UL — SIGNIFICANT CHANGE UP (ref 150–400)
POTASSIUM SERPL-MCNC: 4 MMOL/L — SIGNIFICANT CHANGE UP (ref 3.5–5.3)
POTASSIUM SERPL-SCNC: 4 MMOL/L — SIGNIFICANT CHANGE UP (ref 3.5–5.3)
PROT SERPL-MCNC: 6.6 GM/DL — SIGNIFICANT CHANGE UP (ref 6–8.3)
PROT UR-MCNC: NEGATIVE MG/DL — SIGNIFICANT CHANGE UP
RBC # BLD: 3.76 M/UL — LOW (ref 3.8–5.2)
RBC # FLD: 12.4 % — SIGNIFICANT CHANGE UP (ref 10.3–14.5)
SODIUM SERPL-SCNC: 141 MMOL/L — SIGNIFICANT CHANGE UP (ref 135–145)
SP GR SPEC: 1.01 — SIGNIFICANT CHANGE UP (ref 1.01–1.02)
TROPONIN I SERPL-MCNC: <0.015 NG/ML — SIGNIFICANT CHANGE UP (ref 0.01–0.04)
UROBILINOGEN FLD QL: NEGATIVE MG/DL — SIGNIFICANT CHANGE UP
WBC # BLD: 5.63 K/UL — SIGNIFICANT CHANGE UP (ref 3.8–10.5)
WBC # FLD AUTO: 5.63 K/UL — SIGNIFICANT CHANGE UP (ref 3.8–10.5)

## 2019-10-25 PROCEDURE — 74176 CT ABD & PELVIS W/O CONTRAST: CPT | Mod: 26

## 2019-10-25 RX ORDER — ENOXAPARIN SODIUM 100 MG/ML
40 INJECTION SUBCUTANEOUS EVERY 12 HOURS
Refills: 0 | Status: DISCONTINUED | OUTPATIENT
Start: 2019-10-25 | End: 2019-10-25

## 2019-10-25 RX ORDER — SODIUM CHLORIDE 9 MG/ML
1000 INJECTION, SOLUTION INTRAVENOUS
Refills: 0 | Status: DISCONTINUED | OUTPATIENT
Start: 2019-10-25 | End: 2019-10-27

## 2019-10-25 RX ORDER — LOSARTAN POTASSIUM 100 MG/1
100 TABLET, FILM COATED ORAL DAILY
Refills: 0 | Status: DISCONTINUED | OUTPATIENT
Start: 2019-10-25 | End: 2019-10-27

## 2019-10-25 RX ORDER — SODIUM CHLORIDE 9 MG/ML
1000 INJECTION, SOLUTION INTRAVENOUS
Refills: 0 | Status: COMPLETED | OUTPATIENT
Start: 2019-10-25 | End: 2019-10-25

## 2019-10-25 RX ORDER — DEXTROSE 50 % IN WATER 50 %
15 SYRINGE (ML) INTRAVENOUS ONCE
Refills: 0 | Status: DISCONTINUED | OUTPATIENT
Start: 2019-10-25 | End: 2019-10-27

## 2019-10-25 RX ORDER — DEXTROSE 50 % IN WATER 50 %
25 SYRINGE (ML) INTRAVENOUS ONCE
Refills: 0 | Status: DISCONTINUED | OUTPATIENT
Start: 2019-10-25 | End: 2019-10-27

## 2019-10-25 RX ORDER — VENLAFAXINE HCL 75 MG
150 CAPSULE, EXT RELEASE 24 HR ORAL DAILY
Refills: 0 | Status: DISCONTINUED | OUTPATIENT
Start: 2019-10-25 | End: 2019-10-27

## 2019-10-25 RX ORDER — FERROUS SULFATE 325(65) MG
325 TABLET ORAL AT BEDTIME
Refills: 0 | Status: DISCONTINUED | OUTPATIENT
Start: 2019-10-25 | End: 2019-10-27

## 2019-10-25 RX ORDER — GLUCAGON INJECTION, SOLUTION 0.5 MG/.1ML
1 INJECTION, SOLUTION SUBCUTANEOUS ONCE
Refills: 0 | Status: DISCONTINUED | OUTPATIENT
Start: 2019-10-25 | End: 2019-10-27

## 2019-10-25 RX ORDER — RISPERIDONE 4 MG/1
2 TABLET ORAL
Refills: 0 | Status: DISCONTINUED | OUTPATIENT
Start: 2019-10-25 | End: 2019-10-27

## 2019-10-25 RX ORDER — ALPRAZOLAM 0.25 MG
2 TABLET ORAL
Refills: 0 | Status: DISCONTINUED | OUTPATIENT
Start: 2019-10-25 | End: 2019-10-27

## 2019-10-25 RX ORDER — IPRATROPIUM/ALBUTEROL SULFATE 18-103MCG
3 AEROSOL WITH ADAPTER (GRAM) INHALATION EVERY 6 HOURS
Refills: 0 | Status: DISCONTINUED | OUTPATIENT
Start: 2019-10-25 | End: 2019-10-27

## 2019-10-25 RX ORDER — VENLAFAXINE HCL 75 MG
75 CAPSULE, EXT RELEASE 24 HR ORAL AT BEDTIME
Refills: 0 | Status: DISCONTINUED | OUTPATIENT
Start: 2019-10-25 | End: 2019-10-27

## 2019-10-25 RX ORDER — CITALOPRAM 10 MG/1
20 TABLET, FILM COATED ORAL
Refills: 0 | Status: DISCONTINUED | OUTPATIENT
Start: 2019-10-25 | End: 2019-10-27

## 2019-10-25 RX ORDER — INSULIN LISPRO 100/ML
VIAL (ML) SUBCUTANEOUS
Refills: 0 | Status: DISCONTINUED | OUTPATIENT
Start: 2019-10-25 | End: 2019-10-27

## 2019-10-25 RX ORDER — DEXTROSE 50 % IN WATER 50 %
12.5 SYRINGE (ML) INTRAVENOUS ONCE
Refills: 0 | Status: DISCONTINUED | OUTPATIENT
Start: 2019-10-25 | End: 2019-10-27

## 2019-10-25 RX ORDER — ARIPIPRAZOLE 15 MG/1
30 TABLET ORAL DAILY
Refills: 0 | Status: DISCONTINUED | OUTPATIENT
Start: 2019-10-25 | End: 2019-10-27

## 2019-10-25 RX ORDER — FLUTICASONE FUROATE AND VILANTEROL TRIFENATATE 100; 25 UG/1; UG/1
1 POWDER RESPIRATORY (INHALATION) DAILY
Refills: 0 | Status: DISCONTINUED | OUTPATIENT
Start: 2019-10-25 | End: 2019-10-27

## 2019-10-25 RX ADMIN — Medication 4: at 16:57

## 2019-10-25 RX ADMIN — Medication 150 MILLIGRAM(S): at 11:51

## 2019-10-25 RX ADMIN — LOSARTAN POTASSIUM 100 MILLIGRAM(S): 100 TABLET, FILM COATED ORAL at 16:59

## 2019-10-25 RX ADMIN — Medication 2 MILLIGRAM(S): at 23:36

## 2019-10-25 RX ADMIN — Medication 2 MILLIGRAM(S): at 11:51

## 2019-10-25 RX ADMIN — CITALOPRAM 20 MILLIGRAM(S): 10 TABLET, FILM COATED ORAL at 11:50

## 2019-10-25 RX ADMIN — ARIPIPRAZOLE 30 MILLIGRAM(S): 15 TABLET ORAL at 11:51

## 2019-10-25 RX ADMIN — SODIUM CHLORIDE 75 MILLILITER(S): 9 INJECTION, SOLUTION INTRAVENOUS at 06:46

## 2019-10-25 RX ADMIN — Medication 2 MILLIGRAM(S): at 06:46

## 2019-10-25 RX ADMIN — Medication 75 MILLIGRAM(S): at 21:19

## 2019-10-25 RX ADMIN — RISPERIDONE 2 MILLIGRAM(S): 4 TABLET ORAL at 06:58

## 2019-10-25 RX ADMIN — Medication 2: at 12:05

## 2019-10-25 RX ADMIN — RISPERIDONE 2 MILLIGRAM(S): 4 TABLET ORAL at 16:59

## 2019-10-25 RX ADMIN — Medication 325 MILLIGRAM(S): at 21:19

## 2019-10-25 RX ADMIN — Medication 3: at 08:00

## 2019-10-25 RX ADMIN — Medication 2 MILLIGRAM(S): at 16:58

## 2019-10-25 NOTE — CONSULT NOTE ADULT - SUBJECTIVE AND OBJECTIVE BOX
Chief complaint of Shortness of breath (25 Oct 2019 08:32)      HPI:    58 y/o F PMHx significant for hypertension, bipolar disorder, anxiety, ADHD, and diabetes mellitus type 2, COPD on 5L home O2 was referred to HH from her Pulmonologists' s office where she was being evaluated for progressive symptoms of shortness of breath over the last several weeks. The patient was reportedly found to have had symptoms of associated chest pain/pressures and low blood pressure at the time. Labs => Hgb/Hct 10.8/33.3, Glu 173. CTA PE Protocol => Suboptimal contrast bolus. No large central embolus. Prominent central pulmonary arteries suggestive of pulmonary hypertension. Bibasilar dependent atelectasis. Incidental left fluid distention of the stomach not fully evaluated. Follow-up with abdominal CT as clinically indicated. (24 Oct 2019 23:23)      PAST MEDICAL & SURGICAL HISTORY:  Anemia  COPD (chronic obstructive pulmonary disease)  ADHD (attention deficit hyperactivity disorder)  COLLEEN (obstructive sleep apnea)  Hypertension  Diabetes mellitus: Type 2  Bipolar 1 disorder  Gastric bypass status for obesity   delivery delivered: x2  PAH      PREVIOUS CARDIAC WORKUP:      Echo:  18 Nml EF, trace MR, Moderate TR, moderate PAH, RVH  Cardiac Cath:  Normal cors. Moderate PAH, mildly elevated LVEDP, PCWP    ALLERGIES:    celery allergy (Faint)  No Known Drug Allergies       MEDICATIONS  (STANDING):  ALPRAZolam 2 milliGRAM(s) Oral four times a day  ARIPiprazole 30 milliGRAM(s) Oral daily  citalopram 20 milliGRAM(s) Oral <User Schedule>  dextrose 5%. 1000 milliLiter(s) (50 mL/Hr) IV Continuous <Continuous>  dextrose 50% Injectable 12.5 Gram(s) IV Push once  dextrose 50% Injectable 25 Gram(s) IV Push once  dextrose 50% Injectable 25 Gram(s) IV Push once  ferrous    sulfate 325 milliGRAM(s) Oral at bedtime  fluticasone furoate/vilanterol 100-25 MICROgram(s) Inhaler 1 Puff(s) Inhalation daily  insulin lispro (HumaLOG) corrective regimen sliding scale   SubCutaneous three times a day before meals  losartan 100 milliGRAM(s) Oral daily  risperiDONE   Tablet 2 milliGRAM(s) Oral two times a day  venlafaxine XR. 150 milliGRAM(s) Oral daily  venlafaxine XR. 75 milliGRAM(s) Oral at bedtime    MEDICATIONS  (PRN):  albuterol/ipratropium for Nebulization 3 milliLiter(s) Nebulizer every 6 hours PRN Bronchospasm  dextrose 40% Gel 15 Gram(s) Oral once PRN Blood Glucose LESS THAN 70 milliGRAM(s)/deciliter  glucagon  Injectable 1 milliGRAM(s) IntraMuscular once PRN Glucose LESS THAN 70 milligrams/deciliter      FAMILY HISTORY:  Family history of early CAD        SOCIAL HISTORY:  .scl     ROS:     .ros    Vital Signs Last 24 Hrs  T(C): 37 (25 Oct 2019 05:52), Max: 37 (25 Oct 2019 05:52)  T(F): 98.6 (25 Oct 2019 05:52), Max: 98.6 (25 Oct 2019 05:52)  HR: 76 (25 Oct 2019 05:52) (75 - 89)  BP: 126/76 (25 Oct 2019 05:52) (101/56 - 126/76)  BP(mean): 81 (24 Oct 2019 16:00) (81 - 81)  RR: 17 (25 Oct 2019 05:52) (17 - 18)  SpO2: 100% (25 Oct 2019 05:52) (97% - 100%)    I&O's Summary    24 Oct 2019 07:01  -  25 Oct 2019 07:00  --------------------------------------------------------  IN: 800 mL / OUT: 1000 mL / NET: -200 mL        PHYSICAL EXAM:    .phy      TELEMETRY:    ECG:    LABS:                          10.8   5.63  )-----------( 194      ( 25 Oct 2019 05:06 )             33.9     10-25    141  |  108  |  18  ----------------------------<  267<H>  4.0   |  24  |  0.96    Ca    8.6      25 Oct 2019 05:06  Mg     1.9     10-25    TPro  6.6  /  Alb  3.5  /  TBili  0.5  /  DBili  x   /  AST  8<L>  /  ALT  17  /  AlkPhos  79  10-25        10-25 @ 05:06  Trop-I  <0.015    10-25 @ 02:40  Trop-I  <0.015    10-24 @ 16:03  Trop-I  <0.015    Pro BNP  31 10-24 @ 16:03  D Dimer  -- 10-24 @ 16:03      Urinalysis Basic - ( 24 Oct 2019 02:50 )    Color: Yellow / Appearance: Clear / S.010 / pH: x  Gluc: x / Ketone: Negative  / Bili: Negative / Urobili: Negative mg/dL   Blood: x / Protein: Negative mg/dL / Nitrite: Negative   Leuk Esterase: Negative / RBC: x / WBC x   Sq Epi: x / Non Sq Epi: x / Bacteria: x            RADIOLOGY & ADDITIONAL STUDIES: Chief complaint of Shortness of breath (25 Oct 2019 08:32)      HPI:    58 y/o with progressive symptoms of shortness of breath over last few weeks. Also with c/o chest pain. Recent cardiac cath in 2019 was normal. Also has c/o dizziness and lightheadedness. BP has been low at times.       PAST MEDICAL & SURGICAL HISTORY:  Anemia  COPD (chronic obstructive pulmonary disease)  ADHD (attention deficit hyperactivity disorder)  COLLEEN (obstructive sleep apnea)  Hypertension  Diabetes mellitus: Type 2  Bipolar 1 disorder  Gastric bypass status for obesity   delivery delivered: x2  PAH      PREVIOUS CARDIAC WORKUP:      Echo:  18 Nml EF, trace MR, Moderate TR, moderate PAH, RVH  Cardiac Cath:  Normal cors. Moderate PAH, mildly elevated LVEDP, PCWP    ALLERGIES:    celery allergy (Faint)  No Known Drug Allergies       MEDICATIONS  (STANDING):  ALPRAZolam 2 milliGRAM(s) Oral four times a day  ARIPiprazole 30 milliGRAM(s) Oral daily  citalopram 20 milliGRAM(s) Oral <User Schedule>  dextrose 5%. 1000 milliLiter(s) (50 mL/Hr) IV Continuous <Continuous>  dextrose 50% Injectable 12.5 Gram(s) IV Push once  dextrose 50% Injectable 25 Gram(s) IV Push once  dextrose 50% Injectable 25 Gram(s) IV Push once  ferrous    sulfate 325 milliGRAM(s) Oral at bedtime  fluticasone furoate/vilanterol 100-25 MICROgram(s) Inhaler 1 Puff(s) Inhalation daily  insulin lispro (HumaLOG) corrective regimen sliding scale   SubCutaneous three times a day before meals  losartan 100 milliGRAM(s) Oral daily  risperiDONE   Tablet 2 milliGRAM(s) Oral two times a day  venlafaxine XR. 150 milliGRAM(s) Oral daily  venlafaxine XR. 75 milliGRAM(s) Oral at bedtime    MEDICATIONS  (PRN):  albuterol/ipratropium for Nebulization 3 milliLiter(s) Nebulizer every 6 hours PRN Bronchospasm  dextrose 40% Gel 15 Gram(s) Oral once PRN Blood Glucose LESS THAN 70 milliGRAM(s)/deciliter  glucagon  Injectable 1 milliGRAM(s) IntraMuscular once PRN Glucose LESS THAN 70 milligrams/deciliter      FAMILY HISTORY:  Family history of early CAD        SOCIAL HISTORY:  Nonsmoker. No ETOH abuse. No illicit drugs.     ROS:     Detailed ten system ROS was performed and was negative except for history as eluded to above.    no fever  no chills  no nausea  no vomiting  no diarrhea  no constipation  no melena  no hematochezia  no chest pain  no palpitations  + sob at rest  + dyspnea on exertion  no cough  no wheezing  no anorexia  no headache  no dizziness  no syncope  no weakness  no myalgia  no dysuria  no polyuria  no hematuria     Vital Signs Last 24 Hrs  T(C): 37 (25 Oct 2019 05:52), Max: 37 (25 Oct 2019 05:52)  T(F): 98.6 (25 Oct 2019 05:52), Max: 98.6 (25 Oct 2019 05:52)  HR: 76 (25 Oct 2019 05:52) (75 - 89)  BP: 126/76 (25 Oct 2019 05:52) (101/56 - 126/76)  BP(mean): 81 (24 Oct 2019 16:00) (81 - 81)  RR: 17 (25 Oct 2019 05:52) (17 - 18)  SpO2: 100% (25 Oct 2019 05:52) (97% - 100%)    I&O's Summary    24 Oct 2019 07:01  -  25 Oct 2019 07:00  --------------------------------------------------------  IN: 800 mL / OUT: 1000 mL / NET: -200 mL        PHYSICAL EXAM:    General:                Comfortable, AAO X 3, in no distress.  HEENT:                  Atraumatic, PERRLA, EOMI, conjunctiva clear.    Neck:                     Supple, no adenopathy, no thyromegaly, no JVD, no bruit.  Back:                     Symmetric, non tender.  Chest:                    Clear, B/L symmetric air entry, no tachypnea  Heart:                     S1, S2 normal, no gallop, no murmur.  Abdomen:              Soft, non-tender, bowel sounds active. No palpable masses.  Extremities:           no cyanosis, no edema. Peripheral pulses normal.  Skin:                      Skin color, texture normal. No rashes.  Neurologic:            Grossly nonfocal.       TELEMETRY:  Normal sinus rhythm with no tachy or scott events     ECG:  NSR, poor R wave progression, no ST T changes of ischemia or infarction.     LABS:                          10.8   5.63  )-----------( 194      ( 25 Oct 2019 05:06 )             33.9     10-25    141  |  108  |  18  ----------------------------<  267<H>  4.0   |  24  |  0.96    Ca    8.6      25 Oct 2019 05:06  Mg     1.9     10-25    TPro  6.6  /  Alb  3.5  /  TBili  0.5  /  DBili  x   /  AST  8<L>  /  ALT  17  /  AlkPhos  79  10-25        10-25 @ 05:06  Trop-I  <0.015    10-25 @ 02:40  Trop-I  <0.015    10-24 @ 16:03  Trop-I  <0.015    Pro BNP  31 10-24 @ 16:03  D Dimer  -- 10-24 @ 16:03      Urinalysis Basic - ( 24 Oct 2019 02:50 )    Color: Yellow / Appearance: Clear / S.010 / pH: x  Gluc: x / Ketone: Negative  / Bili: Negative / Urobili: Negative mg/dL   Blood: x / Protein: Negative mg/dL / Nitrite: Negative   Leuk Esterase: Negative / RBC: x / WBC x   Sq Epi: x / Non Sq Epi: x / Bacteria: x    RADIOLOGY & ADDITIONAL STUDIES:    CT Angio Chest PE Protocol w/ IV Cont (10.24.19 @ 19:53) >  Impression: Suboptimal contrast bolus. No large central embolus. Prominent central pulmonary arteries suggestive of pulmonary hypertension. Bibasilar dependent atelectasis. Incidental left fluid distention of the stomach not fully evaluated.     CT Abdomen and Pelvis No Cont (10.25.19 @ 11:48) >  Cholelithiasis and gallbladder distention. Correlate clinically for possible acute cholecystitis. HIDA scan for further evaluation as warranted. Hyperdense liver suggestive of iron deposition. Unusual distribution of the small bowel, most of which is located in the lateral right abdomen. This is felt to most likely represent a normal variant due to the abnormal location of the cecum and right colon. Non obstructing internal hernia is not completely excluded.

## 2019-10-25 NOTE — CONSULT NOTE ADULT - ASSESSMENT
1) Pulmonary Hypertension  2) Dyspnea  3) COPD  4) Chronic Hypoxemic Respiratory Failure  5) Oxygen Dependent  6) Incidental Left Fluid Distention in Stomach    60 y/o F PMHx significant for hypertension, bipolar disorder, anxiety, ADHD, and diabetes mellitus type 2, COPD on 3L home O2 was referred to  from her Pulmonologists' s office where she was being evaluated for progressive symptoms of shortness of breath over the last several weeks. The patient was reportedly found to have had symptoms of associated chest pain/pressures and low blood pressure at the time. Labs => Hgb/Hct 10.8/33.3, Glu 173. CTA PE Protocol => Suboptimal contrast bolus. No large central embolus. Prominent central pulmonary arteries suggestive of pulmonary hypertension. Bibasilar dependent atelectasis. Incidental left fluid distention of the stomach not fully evaluated. Follow-up with abdominal CT as clinically indicated.   History of Sleep apnea, dependent on CPAP (AHI is only 12.5)  COPD wise, she is on Breo, last PFT revealed Mild Obstruction, FEV1 80%, DLCO moderately reduced  RHC revealed PADP 19 mPAP 30, PCW 18, CO 4.52 CI 2.05  TPG 12, DPG 1, PVR 2.65  Continue current treatment  O2 with Goal SaO2 >90%  Hypotension improving  Reviewed CT Chest- will discuss abdominal findings with hospitalist   Follow up Cardiology recommendations    Thank you for the consult; will continue to follow.

## 2019-10-25 NOTE — PATIENT PROFILE ADULT - DO YOU FEEL LIKE HURTING OTHERS?
INTERVAL HPI/OVERNIGHT EVENTS: Patient had no acute events overnight, states " I feel better than when I came in" denies return of bowel function at this time      MEDICATIONS  (STANDING):  lactated ringers. 1000 milliLiter(s) (100 mL/Hr) IV Continuous <Continuous>  pantoprazole  Injectable 40 milliGRAM(s) IV Push two times a day    MEDICATIONS  (PRN):  acetaminophen    Suspension .. 650 milliGRAM(s) Oral every 6 hours PRN Temp greater or equal to 38C (100.4F)  benzocaine 20% Spray 1 Spray(s) Topical every 6 hours PRN throat irritation  metoclopramide Injectable 10 milliGRAM(s) IV Push every 6 hours PRN n/v  morphine  - Injectable 2 milliGRAM(s) IV Push every 6 hours PRN Mod-severe pain 4-10  morphine  - Injectable 4 milliGRAM(s) IV Push every 6 hours PRN breakthrough pain  ondansetron Injectable 4 milliGRAM(s) IV Push every 4 hours PRN Nausea and/or Vomiting      Vital Signs Last 24 Hrs  T(C): 36.2 (08 Aug 2019 00:01), Max: 37.6 (07 Aug 2019 14:13)  T(F): 97.1 (08 Aug 2019 00:01), Max: 99.6 (07 Aug 2019 14:13)  HR: 74 (08 Aug 2019 00:01) (74 - 87)  BP: 134/77 (08 Aug 2019 00:01) (112/68 - 145/63)  BP(mean): --  RR: 18 (08 Aug 2019 00:01) (16 - 22)  SpO2: 97% (08 Aug 2019 00:01) (93% - 97%)    PHYSICAL EXAM:      Constitutional: in no distress    Respiratory: no conversational dyspnea, no respiratory       Cardiovascular: NSR    Gastrointestinal: abdomen softly distended, no pain on palpation, ng tube well seated output appears green bile     Genitourinary: voiding spontaneously     Extremities: atraumatic    Neurological: A&Ox3    Skin: warm, dry and no rashes          I&O's Detail      LABS:                        12.7   13.65 )-----------( 252      ( 06 Aug 2019 22:53 )             40.2     08-06    140  |  100  |  16.0  ----------------------------<  120<H>  3.6   |  27.0  |  0.81    Ca    9.9      06 Aug 2019 22:53  Mg     1.9     08-06    TPro  7.8  /  Alb  4.5  /  TBili  0.2<L>  /  DBili  x   /  AST  21  /  ALT  18  /  AlkPhos  73  08-06    PT/INR - ( 06 Aug 2019 22:53 )   PT: 11.3 sec;   INR: 0.98 ratio         PTT - ( 06 Aug 2019 22:53 )  PTT:35.8 sec      RADIOLOGY & ADDITIONAL STUDIES: no

## 2019-10-25 NOTE — CONSULT NOTE ADULT - ASSESSMENT
Dyspnea - COPD. Doubt ACS. Pt with normal cors in Feb 2019. Doubt CHF, low BNP, clinically not fluid overloaded.  COPD  Anemia  Obesity  PAH  HTN    Suggest:    O2 supplement  Pulmonary evaluation and management  Non cardiac dyspnea  Drop in H/H noted compared to last admission. Needs work up  Weight loss  Management of PAH per pulmonary  Reg systemic hypertension. Currently BP is normal. But pt claims low BP at home. Will monitor BP over the next 24 hours, if low will reduce Losartan dose. Also can reduce diuretic dose and chose HCTZ only  Follow up electrolytes and renal function. Dyspnea - COPD. Doubt ACS. Pt with normal cors in Feb 2019. Doubt CHF, low BNP, clinically not fluid overloaded.  COPD  Anemia  Obesity  PAH  HTN    Suggest:    O2 supplement  Pulmonary evaluation and management  Non cardiac dyspnea  Drop in H/H noted compared to last admission. Needs work up  Weight loss  Management of PAH per pulmonary  Reg systemic hypertension. Currently BP is normal. + orthostasis and  pt claims low BP at home. Will monitor BP over the next 24 hours, if low will reduce Losartan dose. Also can reduce diuretic dose  Give IVF  Follow up electrolytes and renal function.  I shall f/u PRN now.
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
## 2019-10-25 NOTE — PROVIDER CONTACT NOTE (OTHER) - SITUATION
Pulmonary office notified of consult.  Also confirmed with office if Dr. Lopez is also partners as he is patient's primary pulmonologist.

## 2019-10-25 NOTE — CONSULT NOTE ADULT - SUBJECTIVE AND OBJECTIVE BOX
Patient is a 59y old  Female who presents with a chief complaint of Shortness of breath (24 Oct 2019 23:23)      HPI:  58 y/o F PMHx significant for hypertension, bipolar disorder, anxiety, ADHD, and diabetes mellitus type 2, COPD on 5L home O2 was referred to  from her Pulmonologists' s office where she was being evaluated for progressive symptoms of shortness of breath over the last several weeks. The patient was reportedly found to have had symptoms of associated chest pain/pressures and low blood pressure at the time. Labs => Hgb/Hct 10.8/33.3, Glu 173. CTA PE Protocol => Suboptimal contrast bolus. No large central embolus. Prominent central pulmonary arteries suggestive of pulmonary hypertension. Bibasilar dependent atelectasis. Incidental left fluid distention of the stomach not fully evaluated. Follow-up with abdominal CT as clinically indicated. (24 Oct 2019 23:23)      PAST MEDICAL & SURGICAL HISTORY:  Anemia  COPD (chronic obstructive pulmonary disease)  ADHD (attention deficit hyperactivity disorder)  COLLEEN (obstructive sleep apnea)  Hypertension  Diabetes mellitus: Type 2  Bipolar 1 disorder  Gastric bypass status for obesity   delivery delivered: x2      PREVIOUS DIAGNOSTIC TESTING:      MEDICATIONS  (STANDING):  ALPRAZolam 2 milliGRAM(s) Oral four times a day  ARIPiprazole 30 milliGRAM(s) Oral daily  citalopram 20 milliGRAM(s) Oral <User Schedule>  dextrose 5%. 1000 milliLiter(s) (50 mL/Hr) IV Continuous <Continuous>  dextrose 50% Injectable 12.5 Gram(s) IV Push once  dextrose 50% Injectable 25 Gram(s) IV Push once  dextrose 50% Injectable 25 Gram(s) IV Push once  ferrous    sulfate 325 milliGRAM(s) Oral at bedtime  fluticasone furoate/vilanterol 100-25 MICROgram(s) Inhaler 1 Puff(s) Inhalation daily  insulin lispro (HumaLOG) corrective regimen sliding scale   SubCutaneous three times a day before meals  losartan 100 milliGRAM(s) Oral daily  risperiDONE   Tablet 2 milliGRAM(s) Oral two times a day  venlafaxine XR. 150 milliGRAM(s) Oral daily  venlafaxine XR. 75 milliGRAM(s) Oral at bedtime    MEDICATIONS  (PRN):  albuterol/ipratropium for Nebulization 3 milliLiter(s) Nebulizer every 6 hours PRN Bronchospasm  dextrose 40% Gel 15 Gram(s) Oral once PRN Blood Glucose LESS THAN 70 milliGRAM(s)/deciliter  glucagon  Injectable 1 milliGRAM(s) IntraMuscular once PRN Glucose LESS THAN 70 milligrams/deciliter      FAMILY HISTORY:  Family history of early CAD      SOCIAL HISTORY:  ***    REVIEW OF SYSTEM:  dyspnea, fatigue, weakness, all other systems reviewed and are negative    Vital Signs Last 24 Hrs  T(C): 37 (25 Oct 2019 05:52), Max: 37 (25 Oct 2019 05:52)  T(F): 98.6 (25 Oct 2019 05:52), Max: 98.6 (25 Oct 2019 05:52)  HR: 76 (25 Oct 2019 05:52) (75 - 89)  BP: 126/76 (25 Oct 2019 05:52) (101/56 - 126/76)  BP(mean): 81 (24 Oct 2019 16:00) (81 - 81)  RR: 17 (25 Oct 2019 05:52) (17 - 18)  SpO2: 100% (25 Oct 2019 05:52) (97% - 100%)    I&O's Summary    24 Oct 2019 07:01  -  25 Oct 2019 07:00  --------------------------------------------------------  IN: 800 mL / OUT: 1000 mL / NET: -200 mL      PHYSICAL EXAM  General Appearance: cooperative, no acute distress,   HEENT: PERRL, conjunctiva clear, EOM's intact, non injected pharynx, no exudate, TM   normal  Neck: Supple, , no adenopathy, thyroid: not enlarged, no carotid bruit or JVD  Back: Symmetric, no  tenderness,no soft tissue tenderness  Lungs: Clear to auscultation bilateral,no adventitious breath sounds, normal   expiratory phase  Heart: Regular rate and rhythm, S1, S2 normal, no murmur, rub or gallop  Abdomen: Soft, non-tender, bowel sounds active , no hepatosplenomegaly  Extremities: no cyanosis or edema, no joint swelling  Skin: Skin color, texture normal, no rashes   Neurologic: Alert and oriented X3 , cranial nerves intact, sensory and motor normal,    ECG:    LABS:                          10.8   5.63  )-----------( 194      ( 25 Oct 2019 05:06 )             33.9     10    141  |  108  |  18  ----------------------------<  267<H>  4.0   |  24  |  0.96    Ca    8.6      25 Oct 2019 05:06  Mg     1.9     10-25    TPro  6.6  /  Alb  3.5  /  TBili  0.5  /  DBili  x   /  AST  8<L>  /  ALT  17  /  AlkPhos  79  10    CARDIAC MARKERS ( 25 Oct 2019 05:06 )  <0.015 ng/mL / x     / x     / x     / x      CARDIAC MARKERS ( 25 Oct 2019 02:40 )  <0.015 ng/mL / x     / x     / x     / x      CARDIAC MARKERS ( 24 Oct 2019 16:03 )  <0.015 ng/mL / x     / x     / x     / x            Pro BNP  31 10-24 @ 16:03  D Dimer  -- 10-24 @ 16:03      Urinalysis Basic - ( 24 Oct 2019 02:50 )    Color: Yellow / Appearance: Clear / S.010 / pH: x  Gluc: x / Ketone: Negative  / Bili: Negative / Urobili: Negative mg/dL   Blood: x / Protein: Negative mg/dL / Nitrite: Negative   Leuk Esterase: Negative / RBC: x / WBC x   Sq Epi: x / Non Sq Epi: x / Bacteria: x            RADIOLOGY & ADDITIONAL STUDIES:    CTA chest. Prior 2018.    Axial images augmented by coronal set reformats, 3-D MIP images.  90 cc Omnipaque 350 injected intravenously.    Suboptimal contrast bolus. Evaluation for small and peripheral emboli is   limited. No large central embolus.  Nonaneurysmal thoracic aorta. Central airways patent. Prominent central   pulmonary arteries suggestive of pulmonary hypertension.  Streaky bibasilar dependent atelectasis. No lobar consolidation or   pleural effusion.  Mild cardiomegaly. No pericardial abnormality.  Limited views of the upper abdomen demonstrate evidence of a prior   gastric surgery. There is a fluid distention of the stomach not fully   evaluated on these images. Recommend clinical correlation and follow-up   with CT of the abdomen as clinically indicated.  No acute skeletal abnormality.    Impression:    Suboptimal contrast bolus.  No large central embolus.  Prominent central pulmonary arteries suggestive of pulmonary hypertension.  Bibasilar dependent atelectasis.  Incidental left fluid distention of the stomach not fully evaluated.   Follow-up with abdominal CT as clinically indicated.

## 2019-10-26 LAB
IRON SATN MFR SERPL: 29 % — SIGNIFICANT CHANGE UP (ref 14–50)
IRON SATN MFR SERPL: 69 UG/DL — SIGNIFICANT CHANGE UP (ref 30–160)
TIBC SERPL-MCNC: 241 UG/DL — SIGNIFICANT CHANGE UP (ref 220–430)
UIBC SERPL-MCNC: 172 UG/DL — SIGNIFICANT CHANGE UP (ref 110–370)

## 2019-10-26 RX ADMIN — Medication 325 MILLIGRAM(S): at 21:00

## 2019-10-26 RX ADMIN — RISPERIDONE 2 MILLIGRAM(S): 4 TABLET ORAL at 18:04

## 2019-10-26 RX ADMIN — LOSARTAN POTASSIUM 100 MILLIGRAM(S): 100 TABLET, FILM COATED ORAL at 18:04

## 2019-10-26 RX ADMIN — CITALOPRAM 20 MILLIGRAM(S): 10 TABLET, FILM COATED ORAL at 08:56

## 2019-10-26 RX ADMIN — Medication 2 MILLIGRAM(S): at 23:13

## 2019-10-26 RX ADMIN — Medication 2 MILLIGRAM(S): at 05:36

## 2019-10-26 RX ADMIN — Medication 75 MILLIGRAM(S): at 21:00

## 2019-10-26 RX ADMIN — Medication 2 MILLIGRAM(S): at 18:04

## 2019-10-26 RX ADMIN — Medication 3: at 08:05

## 2019-10-26 RX ADMIN — Medication 150 MILLIGRAM(S): at 09:01

## 2019-10-26 RX ADMIN — Medication 2: at 17:02

## 2019-10-26 RX ADMIN — ARIPIPRAZOLE 30 MILLIGRAM(S): 15 TABLET ORAL at 12:05

## 2019-10-26 RX ADMIN — FLUTICASONE FUROATE AND VILANTEROL TRIFENATATE 1 PUFF(S): 100; 25 POWDER RESPIRATORY (INHALATION) at 08:52

## 2019-10-26 RX ADMIN — RISPERIDONE 2 MILLIGRAM(S): 4 TABLET ORAL at 05:36

## 2019-10-26 RX ADMIN — Medication 3: at 12:04

## 2019-10-26 RX ADMIN — Medication 2 MILLIGRAM(S): at 12:10

## 2019-10-26 NOTE — PROGRESS NOTE ADULT - ASSESSMENT
1) Pulmonary Hypertension  2) Dyspnea  3) COPD  4) Chronic Hypoxemic Respiratory Failure  5) Oxygen Dependent  6) Incidental Left Fluid Distention in Stomach    58 y/o F PMHx significant for hypertension, bipolar disorder, anxiety, ADHD, and diabetes mellitus type 2, COPD on 3L home O2 was referred to  from her Pulmonologists' s office where she was being evaluated for progressive symptoms of shortness of breath over the last several weeks. The patient was reportedly found to have had symptoms of associated chest pain/pressures and low blood pressure at the time. Labs => Hgb/Hct 10.8/33.3, Glu 173. CTA PE Protocol => Suboptimal contrast bolus. No large central embolus. Prominent central pulmonary arteries suggestive of pulmonary hypertension. Bibasilar dependent atelectasis. Incidental left fluid distention of the stomach not fully evaluated. Follow-up with abdominal CT as clinically indicated.   History of Sleep apnea, dependent on CPAP (AHI is only 12.5)  COPD wise, she is on Breo, last PFT revealed Mild Obstruction, FEV1 80%, DLCO moderately reduced  RHC revealed PADP 19 mPAP 30, PCW 18, CO 4.52 CI 2.05  TPG 12, DPG 1, PVR 2.65  Continue current treatment  O2 with Goal SaO2 >90%  CT Abdomen revealed Cholelithiasis and gallbladder distention. Correlate clinically for   possible acute cholecystitis. HIDA scan for further evaluation as   warranted. Hyperdense liver suggestive of iron deposition. Unusual distribution of the small bowel, most of which is located in the   lateral right abdomen (normal variant); Nonobstructing internal hernia is not completely excluded.  Follow up Cardiology recommendations    Thank you for the consult; will continue to follow.

## 2019-10-27 VITALS — RESPIRATION RATE: 18 BRPM | TEMPERATURE: 98 F | OXYGEN SATURATION: 97 %

## 2019-10-27 RX ORDER — LOSARTAN POTASSIUM 100 MG/1
1 TABLET, FILM COATED ORAL
Qty: 0 | Refills: 0 | DISCHARGE

## 2019-10-27 RX ORDER — LOSARTAN POTASSIUM 100 MG/1
1 TABLET, FILM COATED ORAL
Qty: 0 | Refills: 0 | DISCHARGE
Start: 2019-10-27

## 2019-10-27 RX ADMIN — RISPERIDONE 2 MILLIGRAM(S): 4 TABLET ORAL at 05:34

## 2019-10-27 RX ADMIN — Medication 2 MILLIGRAM(S): at 12:00

## 2019-10-27 RX ADMIN — LOSARTAN POTASSIUM 100 MILLIGRAM(S): 100 TABLET, FILM COATED ORAL at 05:34

## 2019-10-27 RX ADMIN — FLUTICASONE FUROATE AND VILANTEROL TRIFENATATE 1 PUFF(S): 100; 25 POWDER RESPIRATORY (INHALATION) at 07:41

## 2019-10-27 RX ADMIN — Medication 4: at 08:14

## 2019-10-27 RX ADMIN — CITALOPRAM 20 MILLIGRAM(S): 10 TABLET, FILM COATED ORAL at 08:42

## 2019-10-27 RX ADMIN — Medication 150 MILLIGRAM(S): at 11:58

## 2019-10-27 RX ADMIN — Medication 3: at 11:56

## 2019-10-27 RX ADMIN — ARIPIPRAZOLE 30 MILLIGRAM(S): 15 TABLET ORAL at 11:58

## 2019-10-27 RX ADMIN — Medication 2 MILLIGRAM(S): at 05:34

## 2019-10-27 NOTE — PROGRESS NOTE ADULT - ASSESSMENT
1) Pulmonary Hypertension  2) Dyspnea  3) COPD  4) Chronic Hypoxemic Respiratory Failure  5) Oxygen Dependent  6) Incidental Left Fluid Distention in Stomach    58 y/o F PMHx significant for hypertension, bipolar disorder, anxiety, ADHD, and diabetes mellitus type 2, COPD on 3L home O2 was referred to  from her Pulmonologists' s office where she was being evaluated for progressive symptoms of shortness of breath over the last several weeks. The patient was reportedly found to have had symptoms of associated chest pain/pressures and low blood pressure at the time. Labs => Hgb/Hct 10.8/33.3, Glu 173. CTA PE Protocol => Suboptimal contrast bolus. No large central embolus. Prominent central pulmonary arteries suggestive of pulmonary hypertension. Bibasilar dependent atelectasis. Incidental left fluid distention of the stomach not fully evaluated. Follow-up with abdominal CT as clinically indicated.   History of Sleep apnea, dependent on CPAP (AHI is only 12.5)  COPD wise, she is on Breo, last PFT revealed Mild Obstruction, FEV1 80%, DLCO moderately reduced  RHC revealed PADP 19 mPAP 30, PCW 18, CO 4.52 CI 2.05  TPG 12, DPG 1, PVR 2.65  Continue current treatment  O2 with Goal SaO2 >90%  CT Abdomen revealed Cholelithiasis and gallbladder distention. Correlate clinically for   possible acute cholecystitis. HIDA scan for further evaluation as   warranted. Hyperdense liver suggestive of iron deposition. Unusual distribution of the small bowel, most of which is located in the   lateral right abdomen (normal variant); Nonobstructing internal hernia is not completely excluded.  Will follow up as an outpatient   Thank you for the consult; will continue to follow.

## 2019-10-27 NOTE — DISCHARGE NOTE PROVIDER - HOSPITAL COURSE
Assessment and Plan:         1. dyspnea, chronic hypoxemic respiratory failure    in pt w/ hx pulm htn, copd, COLLEEN    - no documented drop in O2 sats here-  currently 95% on 3LNC--> discussed w/ pt needs to ambulate today 10/26     - continue O2 monitoring- goal > 90%    - no PE, no infiltrate on imaging; normal bnp    - continue home meds- Breo    - cpap     - s/p RHC 2/19 w/ normal coronaries; mod PAH    - Pulm, Cardio f/u appreciated     - CT chest noted      10/27- ambulating w/ her O2.  Continue at home.  f/u Pulm outpt        2. orthostatic hypotension    - hold triamterene/hctz and repeat vitals post gentle ivf    - will remain off this med - bp improved and stable        3. bipolar disorder/anxiety    - f/u Psych outpt        4. T2D    - ISS, fingersticks    - A1C 7.5        5. dvt px    - lovenox sc         6. anemia    - patient reports hx of iron deficiency (she thinks normal colonoscopy 1yr ago w/ Dr. Moon) and has appt next Friday w/ Dr. White, Heme/Onc    - h/h stable, denies blood in stool    - will f/u w/ docs outpatient        7. incidental CT abd findings    -  unusual distribution small bowel considered to be normal variant    - nonobstructing internal hernia     - cholelithiasis  - discussed w/ pt , normal lfts, no ruq pain, no fevers- outpatient GI f/u     - hyperdense liver iron deposition-  normal ferritin and normal iron sat- outpt GI and Heme/Onc

## 2019-10-27 NOTE — PROGRESS NOTE ADULT - SUBJECTIVE AND OBJECTIVE BOX
cc: sob  hpi: 59y female w/ pmh bipolar disorder, anxiety, htn, T2D, copd on 5L home O2 sent to ED w/ worsening sob.    patient seen early this morning- less sob.  O2 sats are 99% on 2-3LNC.   Denies cough, cp, palp, no abd pain, no n/v/d.    10/26- tired,  feels occasional chest discomfort and sob.  States she hasn't ambulated much but agrees to do so today.  Discussed CT abd- denies abd pain, no n/v/d.  Has appt w/ Dr. Ba, Heme/onc, Friday for her iron def. anemia.     10/27- feeling better overall,  discussed w/ pt and PUlm- d/c planning home today.  Will f/u in office.  Also discussed f/u w/ Heme/onc Friday (appt already scheduled) and she will make appt w/ her GI , Dr. Moon.     ros- as per hpi above, other 10 point ros negative    Vital Signs Last 24 Hrs  T(C): 36.6 (27 Oct 2019 05:10), Max: 36.9 (26 Oct 2019 12:19)  T(F): 97.8 (27 Oct 2019 05:10), Max: 98.5 (26 Oct 2019 12:19)  HR: 82 (27 Oct 2019 07:41) (65 - 82)  BP: 111/87 (27 Oct 2019 05:10) (111/87 - 127/74)  BP(mean): --  RR: 17 (27 Oct 2019 05:10) (17 - 18)  SpO2: 99% (27 Oct 2019 05:10) (97% - 99%) on NC      PHYSICAL EXAM:  General: pleasant female, NAD, comfortable  Neuro: AAOx3, no focal deficits  HEENT: NCAT, EOMI   Neck: Soft and supple  Respiratory: CTA b/l, no w/r/r; non labored breathing on 3-4LNC  Cardiovascular: S1 and S2, RRR  Gastrointestinal: non ttp   Extremities: No edema  Vascular: 2+ peripheral pulses  Musculoskeletal: 5/5 strength b/l UE and LE        LABS: All Labs Reviewed:                         10.8   5.63  )-----------( 194      ( 25 Oct 2019 05:06 )             33.9     10-25    141  |  108  |  18  ----------------------------<  267<H>  4.0   |  24  |  0.96    Ca    8.6      25 Oct 2019 05:06  Mg     1.9     10-25    TPro  6.6  /  Alb  3.5  /  TBili  0.5  /  DBili  x   /  AST  8<L>  /  ALT  17  /  AlkPhos  79  10-25      CARDIAC MARKERS ( 25 Oct 2019 05:06 )  <0.015 ng/mL / x     / x     / x     / x      CARDIAC MARKERS ( 25 Oct 2019 02:40 )  <0.015 ng/mL / x     / x     / x     / x      CARDIAC MARKERS ( 24 Oct 2019 16:03 )  <0.015 ng/mL / x     / x     / x     / x          MEDICATIONS  (STANDING):  ALPRAZolam 2 milliGRAM(s) Oral four times a day  ARIPiprazole 30 milliGRAM(s) Oral daily  citalopram 20 milliGRAM(s) Oral <User Schedule>  dextrose 5%. 1000 milliLiter(s) (50 mL/Hr) IV Continuous <Continuous>  dextrose 50% Injectable 12.5 Gram(s) IV Push once  dextrose 50% Injectable 25 Gram(s) IV Push once  dextrose 50% Injectable 25 Gram(s) IV Push once  ferrous    sulfate 325 milliGRAM(s) Oral at bedtime  fluticasone furoate/vilanterol 100-25 MICROgram(s) Inhaler 1 Puff(s) Inhalation daily  insulin lispro (HumaLOG) corrective regimen sliding scale   SubCutaneous three times a day before meals  losartan 100 milliGRAM(s) Oral daily  risperiDONE   Tablet 2 milliGRAM(s) Oral two times a day  venlafaxine XR. 150 milliGRAM(s) Oral daily  venlafaxine XR. 75 milliGRAM(s) Oral at bedtime    MEDICATIONS  (PRN):  albuterol/ipratropium for Nebulization 3 milliLiter(s) Nebulizer every 6 hours PRN Bronchospasm  dextrose 40% Gel 15 Gram(s) Oral once PRN Blood Glucose LESS THAN 70 milliGRAM(s)/deciliter  glucagon  Injectable 1 milliGRAM(s) IntraMuscular once PRN Glucose LESS THAN 70 milligrams/deciliter            Assessment and Plan:     1. dyspnea, chronic hypoxemic respiratory failure  in pt w/ hx pulm htn, copd, COLLEEN  - no documented drop in O2 sats here-  currently 95% on 3LNC--> discussed w/ pt needs to ambulate today 10/26   - continue O2 monitoring- goal > 90%  - no PE, no infiltrate on imaging; normal bnp  - continue home meds- Breo  - cpap   - s/p RHC 2/19 w/ normal coronaries; mod PAH  - Pulm, Cardio f/u appreciated   - CT chest noted    10/27- ambulating w/ her O2.  Continue at home.  f/u Pulm outpt    2. orthostatic hypotension  - hold triamterene/hctz and repeat vitals post gentle ivf  - will remain off this med - bp improved and stable    3. bipolar disorder/anxiety  - f/u Psych outpt    4. T2D  - ISS, fingersticks  - A1C 7.5    5. dvt px  - lovenox sc     6. anemia  - patient reports hx of iron deficiency (she thinks normal colonoscopy 1yr ago w/ Dr. Moon) and has appt next Friday w/ Dr. White, Heme/Onc  - h/h stable, denies blood in stool  - will f/u w/ docs outpatient    7. incidental CT abd findings  -  unusual distribution small bowel considered to be normal variant  - nonobstructing internal hernia   - cholelithiasis  - discussed w/ pt , normal lfts, no ruq pain, no fevers- outpatient GI f/u   - hyperdense liver iron deposition-  normal ferritin and normal iron sat- outpt GI and Heme/Onc      Ambulate, check O2 sats w/ exertion.  d/c planning likely [facility]  10/27- stable for d/c home.  Time 55min
cc: sob  hpi: 59y female w/ pmh bipolar disorder, anxiety, htn, T2D, copd on 5L home O2 sent to ED w/ worsening sob.    patient seen early this morning- less sob.  O2 sats are 99% on 2-3LNC.   Denies cough, cp, palp, no abd pain, no n/v/d.    10/26- tired,  feels occasional chest discomfort and sob.  States she hasn't ambulated much but agrees to do so today.  Discussed CT abd- denies abd pain, no n/v/d.  Has appt w/ Dr. Ba, Heme/onc, Friday for her iron def. anemia.     ros- as per hpi above, other 10 point ros negative    Vital Signs Last 24 Hrs  T(C): 36.9 (26 Oct 2019 12:19), Max: 37 (25 Oct 2019 16:13)  T(F): 98.5 (26 Oct 2019 12:19), Max: 98.6 (25 Oct 2019 16:13)  HR: 72 (26 Oct 2019 12:19) (68 - 92)  BP: 120/73 (26 Oct 2019 12:19) (116/59 - 124/59)  BP(mean): --  RR: 18 (26 Oct 2019 12:19) (18 - 18)  SpO2: 97% (26 Oct 2019 12:19) (97% - 99%) on 3LNC      PHYSICAL EXAM:  General: NAD, comfortable; lying flat in bed  Neuro: AAOx3, no focal deficits  HEENT: NCAT, EOMI   Neck: Soft and supple  Respiratory: CTA b/l, no w/r/r  Cardiovascular: S1 and S2, RRR  Gastrointestinal: +BS, soft, NTND; no rebound tenderness or guarding  Extremities: No edema  Vascular: 2+ peripheral pulses  Musculoskeletal: 5/5 strength b/l UE and LE        LABS: All Labs Reviewed:                         10.8   5.63  )-----------( 194      ( 25 Oct 2019 05:06 )             33.9     10-25    141  |  108  |  18  ----------------------------<  267<H>  4.0   |  24  |  0.96    Ca    8.6      25 Oct 2019 05:06  Mg     1.9     10-25    TPro  6.6  /  Alb  3.5  /  TBili  0.5  /  DBili  x   /  AST  8<L>  /  ALT  17  /  AlkPhos  79  10-25      CARDIAC MARKERS ( 25 Oct 2019 05:06 )  <0.015 ng/mL / x     / x     / x     / x      CARDIAC MARKERS ( 25 Oct 2019 02:40 )  <0.015 ng/mL / x     / x     / x     / x      CARDIAC MARKERS ( 24 Oct 2019 16:03 )  <0.015 ng/mL / x     / x     / x     / x        MEDICATIONS  (STANDING):  ALPRAZolam 2 milliGRAM(s) Oral four times a day  ARIPiprazole 30 milliGRAM(s) Oral daily  citalopram 20 milliGRAM(s) Oral <User Schedule>  dextrose 5%. 1000 milliLiter(s) (50 mL/Hr) IV Continuous <Continuous>  dextrose 50% Injectable 12.5 Gram(s) IV Push once  dextrose 50% Injectable 25 Gram(s) IV Push once  dextrose 50% Injectable 25 Gram(s) IV Push once  ferrous    sulfate 325 milliGRAM(s) Oral at bedtime  fluticasone furoate/vilanterol 100-25 MICROgram(s) Inhaler 1 Puff(s) Inhalation daily  insulin lispro (HumaLOG) corrective regimen sliding scale   SubCutaneous three times a day before meals  losartan 100 milliGRAM(s) Oral daily  risperiDONE   Tablet 2 milliGRAM(s) Oral two times a day  venlafaxine XR. 150 milliGRAM(s) Oral daily  venlafaxine XR. 75 milliGRAM(s) Oral at bedtime    MEDICATIONS  (PRN):  albuterol/ipratropium for Nebulization 3 milliLiter(s) Nebulizer every 6 hours PRN Bronchospasm  dextrose 40% Gel 15 Gram(s) Oral once PRN Blood Glucose LESS THAN 70 milliGRAM(s)/deciliter  glucagon  Injectable 1 milliGRAM(s) IntraMuscular once PRN Glucose LESS THAN 70 milligrams/deciliter          Assessment and Plan:     1. dyspnea, chronic hypoxemic respiratory failure  in pt w/ hx pulm htn, copd, COLLEEN  - no documented drop in O2 sats here-  currently 95% on 3LNC--> discussed w/ pt needs to ambulate today 10/26   - continue O2 monitoring- goal > 90%  - no PE, no infiltrate on imaging; normal bnp  - continue home meds- Breo  - cpap   - s/p RHC 2/19 w/ normal coronaries; mod PAH  - Pulm, Cardio f/u appreciated   - CT chest noted      2. orthostatic hypotension  - hold triamterene/hctz and repeat vitals post gentle ivf    3. bipolar disorder/anxiety  - f/u Psych     4. T2D  - ISS, fingersticks  - A1C 7.5    5. dvt px  - lovenox sc     6. anemia  - patient reports hx of iron deficiency (she thinks normal colonoscopy 1yr ago w/ Dr. Moon) and has appt next Friday w/ Dr. White, Heme/Onc  - h/h stable, denies blood in stool    7. incidental CT abd findings  -  unusual distribution small bowel considered to be normal variant  - nonobstructing internal hernia   - cholelithiasis  - discussed w/ pt , normal lfts, no ruq pain, no fevers- outpatient GI f/u   - hyperdense liver iron deposition-  normal ferritin and normal iron sat- outpt GI and Heme/Onc      Ambulate, check O2 sats w/ exertion.  d/c planning likely Sunday
Patient is a 59y old  Female who presents with a chief complaint of Shortness of breath (24 Oct 2019 23:23)      HPI:  58 y/o F PMHx significant for hypertension, bipolar disorder, anxiety, ADHD, and diabetes mellitus type 2, COPD on 5L home O2 was referred to HH from her Pulmonologists' s office where she was being evaluated for progressive symptoms of shortness of breath over the last several weeks. The patient was reportedly found to have had symptoms of associated chest pain/pressures and low blood pressure at the time. Labs => Hgb/Hct 10.8/33.3, Glu 173. CTA PE Protocol => Suboptimal contrast bolus. No large central embolus. Prominent central pulmonary arteries suggestive of pulmonary hypertension. Bibasilar dependent atelectasis. Incidental left fluid distention of the stomach not fully evaluated. Follow-up with abdominal CT as clinically indicated. (24 Oct 2019 23:23)    10/26/19  No acute pulmonary events occurred overnight  Still mildly dyspneic with minimal exertion  Sleeping  Underwent CT Abdomen     PAST MEDICAL & SURGICAL HISTORY:  Anemia  COPD (chronic obstructive pulmonary disease)  ADHD (attention deficit hyperactivity disorder)  COLLEEN (obstructive sleep apnea)  Hypertension  Diabetes mellitus: Type 2  Bipolar 1 disorder  Gastric bypass status for obesity   delivery delivered: x2      MEDICATIONS  (STANDING):  ALPRAZolam 2 milliGRAM(s) Oral four times a day  ARIPiprazole 30 milliGRAM(s) Oral daily  citalopram 20 milliGRAM(s) Oral <User Schedule>  dextrose 5%. 1000 milliLiter(s) (50 mL/Hr) IV Continuous <Continuous>  dextrose 50% Injectable 12.5 Gram(s) IV Push once  dextrose 50% Injectable 25 Gram(s) IV Push once  dextrose 50% Injectable 25 Gram(s) IV Push once  ferrous    sulfate 325 milliGRAM(s) Oral at bedtime  fluticasone furoate/vilanterol 100-25 MICROgram(s) Inhaler 1 Puff(s) Inhalation daily  insulin lispro (HumaLOG) corrective regimen sliding scale   SubCutaneous three times a day before meals  losartan 100 milliGRAM(s) Oral daily  risperiDONE   Tablet 2 milliGRAM(s) Oral two times a day  venlafaxine XR. 150 milliGRAM(s) Oral daily  venlafaxine XR. 75 milliGRAM(s) Oral at bedtime    MEDICATIONS  (PRN):  albuterol/ipratropium for Nebulization 3 milliLiter(s) Nebulizer every 6 hours PRN Bronchospasm  dextrose 40% Gel 15 Gram(s) Oral once PRN Blood Glucose LESS THAN 70 milliGRAM(s)/deciliter  glucagon  Injectable 1 milliGRAM(s) IntraMuscular once PRN Glucose LESS THAN 70 milligrams/deciliter    Vital Signs Last 24 Hrs  T(C): 36.5 (26 Oct 2019 06:00), Max: 37 (25 Oct 2019 16:13)  T(F): 97.7 (26 Oct 2019 06:00), Max: 98.6 (25 Oct 2019 16:13)  HR: 68 (26 Oct 2019 06:00) (58 - 92)  BP: 124/59 (26 Oct 2019 06:00) (116/59 - 126/58)  BP(mean): --  RR: 18 (26 Oct 2019 06:00) (18 - 18)  SpO2: 97% (26 Oct 2019 06:00) (95% - 99%)    I&O's Summary    24 Oct 2019 07:01  -  25 Oct 2019 07:00  --------------------------------------------------------  IN: 800 mL / OUT: 1000 mL / NET: -200 mL      PHYSICAL EXAM  General Appearance: cooperative, no acute distress,   HEENT: PERRL, conjunctiva clear, EOM's intact, non injected pharynx, no exudate, TM   normal  Neck: Supple, , no adenopathy, thyroid: not enlarged, no carotid bruit or JVD  Back: Symmetric, no  tenderness,no soft tissue tenderness  Lungs: Clear to auscultation bilateral,no adventitious breath sounds, normal   expiratory phase  Heart: Regular rate and rhythm, S1, S2 normal, no murmur, rub or gallop  Abdomen: Soft, non-tender, bowel sounds active , no hepatosplenomegaly  Extremities: no cyanosis or edema, no joint swelling  Skin: Skin color, texture normal, no rashes   Neurologic: Alert and oriented X3 , cranial nerves intact, sensory and motor normal,    ECG:    LABS:                          10.8   5.63  )-----------( 194      ( 25 Oct 2019 05:06 )             33.9     10-25    141  |  108  |  18  ----------------------------<  267<H>  4.0   |  24  |  0.96    Ca    8.6      25 Oct 2019 05:06  Mg     1.9     10-25    TPro  6.6  /  Alb  3.5  /  TBili  0.5  /  DBili  x   /  AST  8<L>  /  ALT  17  /  AlkPhos  79  10-25    CARDIAC MARKERS ( 25 Oct 2019 05:06 )  <0.015 ng/mL / x     / x     / x     / x      CARDIAC MARKERS ( 25 Oct 2019 02:40 )  <0.015 ng/mL / x     / x     / x     / x      CARDIAC MARKERS ( 24 Oct 2019 16:03 )  <0.015 ng/mL / x     / x     / x     / x            Pro BNP  31 10-24 @ 16:03  D Dimer  -- 10-24 @ 16:03      Urinalysis Basic - ( 24 Oct 2019 02:50 )    Color: Yellow / Appearance: Clear / S.010 / pH: x  Gluc: x / Ketone: Negative  / Bili: Negative / Urobili: Negative mg/dL   Blood: x / Protein: Negative mg/dL / Nitrite: Negative   Leuk Esterase: Negative / RBC: x / WBC x   Sq Epi: x / Non Sq Epi: x / Bacteria: x            RADIOLOGY & ADDITIONAL STUDIES:    CTA chest. Prior 2018.    Axial images augmented by coronal set reformats, 3-D MIP images.  90 cc Omnipaque 350 injected intravenously.    Suboptimal contrast bolus. Evaluation for small and peripheral emboli is   limited. No large central embolus.  Nonaneurysmal thoracic aorta. Central airways patent. Prominent central   pulmonary arteries suggestive of pulmonary hypertension.  Streaky bibasilar dependent atelectasis. No lobar consolidation or   pleural effusion.  Mild cardiomegaly. No pericardial abnormality.  Limited views of the upper abdomen demonstrate evidence of a prior   gastric surgery. There is a fluid distention of the stomach not fully   evaluated on these images. Recommend clinical correlation and follow-up   with CT of the abdomen as clinically indicated.  No acute skeletal abnormality.    Impression:    Suboptimal contrast bolus.  No large central embolus.  Prominent central pulmonary arteries suggestive of pulmonary hypertension.  Bibasilar dependent atelectasis.  Incidental left fluid distention of the stomach not fully evaluated.   Follow-up with abdominal CT as clinically indicated.
Patient is a 59y old  Female who presents with a chief complaint of Shortness of breath (24 Oct 2019 23:23)      HPI:  60 y/o F PMHx significant for hypertension, bipolar disorder, anxiety, ADHD, and diabetes mellitus type 2, COPD on 5L home O2 was referred to HH from her Pulmonologists' s office where she was being evaluated for progressive symptoms of shortness of breath over the last several weeks. The patient was reportedly found to have had symptoms of associated chest pain/pressures and low blood pressure at the time. Labs => Hgb/Hct 10.8/33.3, Glu 173. CTA PE Protocol => Suboptimal contrast bolus. No large central embolus. Prominent central pulmonary arteries suggestive of pulmonary hypertension. Bibasilar dependent atelectasis. Incidental left fluid distention of the stomach not fully evaluated. Follow-up with abdominal CT as clinically indicated. (24 Oct 2019 23:23)    10/26/19  No acute pulmonary events occurred overnight  Still mildly dyspneic with minimal exertion  Sleeping  Underwent CT Abdomen     10/27/19  Patient is feeling better  No acute pulmonary events occurred overnight     PAST MEDICAL & SURGICAL HISTORY:  Anemia  COPD (chronic obstructive pulmonary disease)  ADHD (attention deficit hyperactivity disorder)  COLLEEN (obstructive sleep apnea)  Hypertension  Diabetes mellitus: Type 2  Bipolar 1 disorder  Gastric bypass status for obesity   delivery delivered: x2      MEDICATIONS  (STANDING):  ALPRAZolam 2 milliGRAM(s) Oral four times a day  ARIPiprazole 30 milliGRAM(s) Oral daily  citalopram 20 milliGRAM(s) Oral <User Schedule>  dextrose 5%. 1000 milliLiter(s) (50 mL/Hr) IV Continuous <Continuous>  dextrose 50% Injectable 12.5 Gram(s) IV Push once  dextrose 50% Injectable 25 Gram(s) IV Push once  dextrose 50% Injectable 25 Gram(s) IV Push once  ferrous    sulfate 325 milliGRAM(s) Oral at bedtime  fluticasone furoate/vilanterol 100-25 MICROgram(s) Inhaler 1 Puff(s) Inhalation daily  insulin lispro (HumaLOG) corrective regimen sliding scale   SubCutaneous three times a day before meals  losartan 100 milliGRAM(s) Oral daily  risperiDONE   Tablet 2 milliGRAM(s) Oral two times a day  venlafaxine XR. 150 milliGRAM(s) Oral daily  venlafaxine XR. 75 milliGRAM(s) Oral at bedtime    MEDICATIONS  (PRN):  albuterol/ipratropium for Nebulization 3 milliLiter(s) Nebulizer every 6 hours PRN Bronchospasm  dextrose 40% Gel 15 Gram(s) Oral once PRN Blood Glucose LESS THAN 70 milliGRAM(s)/deciliter  glucagon  Injectable 1 milliGRAM(s) IntraMuscular once PRN Glucose LESS THAN 70 milligrams/deciliter    Vital Signs Last 24 Hrs  T(C): 36.6 (27 Oct 2019 05:10), Max: 36.9 (26 Oct 2019 12:19)  T(F): 97.8 (27 Oct 2019 05:10), Max: 98.5 (26 Oct 2019 12:19)  HR: 82 (27 Oct 2019 07:41) (65 - 82)  BP: 111/87 (27 Oct 2019 05:10) (111/87 - 127/74)  BP(mean): --  RR: 17 (27 Oct 2019 05:10) (17 - 18)  SpO2: 99% (27 Oct 2019 05:10) (97% - 99%)    I&O's Summary    24 Oct 2019 07:01  -  25 Oct 2019 07:00  --------------------------------------------------------  IN: 800 mL / OUT: 1000 mL / NET: -200 mL      PHYSICAL EXAM  General Appearance: cooperative, no acute distress,   HEENT: PERRL, conjunctiva clear, EOM's intact, non injected pharynx, no exudate, TM   normal  Neck: Supple, , no adenopathy, thyroid: not enlarged, no carotid bruit or JVD  Back: Symmetric, no  tenderness,no soft tissue tenderness  Lungs: Clear to auscultation bilateral,no adventitious breath sounds, normal   expiratory phase  Heart: Regular rate and rhythm, S1, S2 normal, no murmur, rub or gallop  Abdomen: Soft, non-tender, bowel sounds active , no hepatosplenomegaly  Extremities: no cyanosis or edema, no joint swelling  Skin: Skin color, texture normal, no rashes   Neurologic: Alert and oriented X3 , cranial nerves intact, sensory and motor normal,    ECG:    LABS:                          10.8   5.63  )-----------( 194      ( 25 Oct 2019 05:06 )             33.9     10-    141  |  108  |  18  ----------------------------<  267<H>  4.0   |  24  |  0.96    Ca    8.6      25 Oct 2019 05:06  Mg     1.9     10-    TPro  6.6  /  Alb  3.5  /  TBili  0.5  /  DBili  x   /  AST  8<L>  /  ALT  17  /  AlkPhos  79  10    CARDIAC MARKERS ( 25 Oct 2019 05:06 )  <0.015 ng/mL / x     / x     / x     / x      CARDIAC MARKERS ( 25 Oct 2019 02:40 )  <0.015 ng/mL / x     / x     / x     / x      CARDIAC MARKERS ( 24 Oct 2019 16:03 )  <0.015 ng/mL / x     / x     / x     / x            Pro BNP  31 10-24 @ 16:03  D Dimer  -- 10-24 @ 16:03      Urinalysis Basic - ( 24 Oct 2019 02:50 )    Color: Yellow / Appearance: Clear / S.010 / pH: x  Gluc: x / Ketone: Negative  / Bili: Negative / Urobili: Negative mg/dL   Blood: x / Protein: Negative mg/dL / Nitrite: Negative   Leuk Esterase: Negative / RBC: x / WBC x   Sq Epi: x / Non Sq Epi: x / Bacteria: x            RADIOLOGY & ADDITIONAL STUDIES:    CTA chest. Prior 2018.    Axial images augmented by coronal set reformats, 3-D MIP images.  90 cc Omnipaque 350 injected intravenously.    Suboptimal contrast bolus. Evaluation for small and peripheral emboli is   limited. No large central embolus.  Nonaneurysmal thoracic aorta. Central airways patent. Prominent central   pulmonary arteries suggestive of pulmonary hypertension.  Streaky bibasilar dependent atelectasis. No lobar consolidation or   pleural effusion.  Mild cardiomegaly. No pericardial abnormality.  Limited views of the upper abdomen demonstrate evidence of a prior   gastric surgery. There is a fluid distention of the stomach not fully   evaluated on these images. Recommend clinical correlation and follow-up   with CT of the abdomen as clinically indicated.  No acute skeletal abnormality.    Impression:    Suboptimal contrast bolus.  No large central embolus.  Prominent central pulmonary arteries suggestive of pulmonary hypertension.  Bibasilar dependent atelectasis.  Incidental left fluid distention of the stomach not fully evaluated.   Follow-up with abdominal CT as clinically indicated.
cc: sob  hpi: 59y female w/ pmh bipolar disorder, anxiety, htn, T2D, copd on 5L home O2 sent to ED w/ worsening sob.    patient seen early this morning- less sob.  O2 sats are 99% on 2-3LNC.   Denies cough, cp, palp, no abd pain, no n/v/d.      ros- as per hpi above, other 10 point ros negative    Vital Signs Last 24 Hrs  T(C): 36.8 (25 Oct 2019 10:34), Max: 37 (25 Oct 2019 05:52)  T(F): 98.2 (25 Oct 2019 10:34), Max: 98.6 (25 Oct 2019 05:52)  HR: 58 (25 Oct 2019 10:34) (58 - 89)  BP: 126/58 (25 Oct 2019 10:34) (101/56 - 126/76)  BP(mean): 81 (24 Oct 2019 16:00) (81 - 81)  RR: 18 (25 Oct 2019 10:34) (17 - 18)  SpO2: 95% (25 Oct 2019 10:34) (95% - 100%)      PHYSICAL EXAM:  General: NAD, comfortable; lying flat in bed  Neuro: AAOx3, no focal deficits  HEENT: NCAT, EOMI   Neck: Soft and supple  Respiratory: CTA b/l, no w/r/r  Cardiovascular: S1 and S2, RRR  Gastrointestinal: +BS, soft, NTND  Extremities: No edema  Vascular: 2+ peripheral pulses  Musculoskeletal: 5/5 strength b/l UE and LE          LABS: All Labs Reviewed:                        10.8   5.63  )-----------( 194      ( 25 Oct 2019 05:06 )             33.9     10-25    141  |  108  |  18  ----------------------------<  267<H>  4.0   |  24  |  0.96    Ca    8.6      25 Oct 2019 05:06  Mg     1.9     10-25    TPro  6.6  /  Alb  3.5  /  TBili  0.5  /  DBili  x   /  AST  8<L>  /  ALT  17  /  AlkPhos  79  10-25      CARDIAC MARKERS ( 25 Oct 2019 05:06 )  <0.015 ng/mL / x     / x     / x     / x      CARDIAC MARKERS ( 25 Oct 2019 02:40 )  <0.015 ng/mL / x     / x     / x     / x      CARDIAC MARKERS ( 24 Oct 2019 16:03 )  <0.015 ng/mL / x     / x     / x     / x        MEDICATIONS  (STANDING):  ALPRAZolam 2 milliGRAM(s) Oral four times a day  ARIPiprazole 30 milliGRAM(s) Oral daily  citalopram 20 milliGRAM(s) Oral <User Schedule>  dextrose 5%. 1000 milliLiter(s) (50 mL/Hr) IV Continuous <Continuous>  dextrose 50% Injectable 12.5 Gram(s) IV Push once  dextrose 50% Injectable 25 Gram(s) IV Push once  dextrose 50% Injectable 25 Gram(s) IV Push once  ferrous    sulfate 325 milliGRAM(s) Oral at bedtime  fluticasone furoate/vilanterol 100-25 MICROgram(s) Inhaler 1 Puff(s) Inhalation daily  insulin lispro (HumaLOG) corrective regimen sliding scale   SubCutaneous three times a day before meals  losartan 100 milliGRAM(s) Oral daily  risperiDONE   Tablet 2 milliGRAM(s) Oral two times a day  venlafaxine XR. 150 milliGRAM(s) Oral daily  venlafaxine XR. 75 milliGRAM(s) Oral at bedtime    MEDICATIONS  (PRN):  albuterol/ipratropium for Nebulization 3 milliLiter(s) Nebulizer every 6 hours PRN Bronchospasm  dextrose 40% Gel 15 Gram(s) Oral once PRN Blood Glucose LESS THAN 70 milliGRAM(s)/deciliter  glucagon  Injectable 1 milliGRAM(s) IntraMuscular once PRN Glucose LESS THAN 70 milligrams/deciliter        Assessment and Plan:     1. dyspnea, chronic hypoxemic respiratory failure  in pt w/ hx pulm htn, copd, COLLEEN  - no documented drop in O2 sats here-  currently 95% on 3LNC  - continue O2 monitoring- goal > 90%  - no PE, no infiltrate on imaging; normal bnp  - continue home meds- Breo  - cpap  - s/p RHC 2/19 w/ normal coronaries; mod PAH  - Pulm, Cardio f/u appreciated   - CT chest noted- f/u CT abd    2. orthostatic hypotension  - hold triamterene/hctz and repeat vitals post gentle ivf    3. bipolar disorder/anxiety  - f/u Psych    4. T2D  - ISS, fingersticks  - A1C 7.5    5. dvt px  -

## 2019-10-27 NOTE — DISCHARGE NOTE PROVIDER - NSDCCPCAREPLAN_GEN_ALL_CORE_FT
PRINCIPAL DISCHARGE DIAGNOSIS  Diagnosis: Pulmonary hypertension  Assessment and Plan of Treatment: continue your home oxygen for your pulm htn, copd, sleep apnea  - Follow up with Dr. Lopez      SECONDARY DISCHARGE DIAGNOSES  Diagnosis: Liver finding  Assessment and Plan of Treatment: CT of abdomen showed incidental findings.  i  - hyperdense liver possible iron deposition.  Your ferritin level and iron sat levels in hospital are normal.    - Schedule appt w/ Dr. Moon for further eval.    - nonobstructing internal hernia and stones in gallbladder seen on imaging but no inflammation.  Follow up with Dr. Moon    Diagnosis: Anemia  Assessment and Plan of Treatment: follow up with Dr. Ba on Friday as scheduled.    Diagnosis: Orthostatic hypotension  Assessment and Plan of Treatment: Do not take  triamterene-hctz.  Your blood pressure improved and is stable off of this med.  Follow up with your Cardiologist.

## 2019-10-27 NOTE — DISCHARGE NOTE PROVIDER - PROVIDER TOKENS
PROVIDER:[TOKEN:[[id number]]],PROVIDER:[TOKEN:[[id number]]],PROVIDER:[TOKEN:[[id number]]],PROVIDER:[TOKEN:[[id number]]]

## 2019-10-27 NOTE — DISCHARGE NOTE NURSING/CASE MANAGEMENT/SOCIAL WORK - PATIENT PORTAL LINK FT
You can access the FollowMyHealth Patient Portal offered by [facility] by registering at the following website: [email or web address]. By joining [facility]’s FollowMyHealth portal, you will also be able to view your health information using other applications (apps) compatible with our system.

## 2019-10-27 NOTE — DISCHARGE NOTE PROVIDER - CARE PROVIDER_API CALL
Henry Lopez)  Internal Medicine  [address] [place] [address]  Phone: [phone]  Fax: [phone]  Follow Up Time:     Narinder Moon)  Gastroenterology; Internal Medicine  [address] [place] [address]  Phone: [phone]  Fax: [phone]  Follow Up Time:     Lauren Ba)  Hematology; Internal Medicine; Medical Oncology  [address] [place] [address], [address] [place] [address]  Phone: [phone]  Fax: [phone]  Follow Up Time:     Taco Gipson)  Cardiology; Interventional Cardiology  [address] [facility], [place] [address]  Phone: [phone]  Fax: [phone]  Follow Up Time:

## 2019-10-31 DIAGNOSIS — E66.9 OBESITY, UNSPECIFIED: ICD-10-CM

## 2019-10-31 DIAGNOSIS — I27.20 PULMONARY HYPERTENSION, UNSPECIFIED: ICD-10-CM

## 2019-10-31 DIAGNOSIS — K80.20 CALCULUS OF GALLBLADDER WITHOUT CHOLECYSTITIS WITHOUT OBSTRUCTION: ICD-10-CM

## 2019-10-31 DIAGNOSIS — J96.11 CHRONIC RESPIRATORY FAILURE WITH HYPOXIA: ICD-10-CM

## 2019-10-31 DIAGNOSIS — I95.1 ORTHOSTATIC HYPOTENSION: ICD-10-CM

## 2019-10-31 DIAGNOSIS — Z99.81 DEPENDENCE ON SUPPLEMENTAL OXYGEN: ICD-10-CM

## 2019-10-31 DIAGNOSIS — F90.9 ATTENTION-DEFICIT HYPERACTIVITY DISORDER, UNSPECIFIED TYPE: ICD-10-CM

## 2019-10-31 DIAGNOSIS — E11.9 TYPE 2 DIABETES MELLITUS WITHOUT COMPLICATIONS: ICD-10-CM

## 2019-10-31 DIAGNOSIS — K46.9 UNSPECIFIED ABDOMINAL HERNIA WITHOUT OBSTRUCTION OR GANGRENE: ICD-10-CM

## 2019-10-31 DIAGNOSIS — Z98.84 BARIATRIC SURGERY STATUS: ICD-10-CM

## 2019-10-31 DIAGNOSIS — G47.33 OBSTRUCTIVE SLEEP APNEA (ADULT) (PEDIATRIC): ICD-10-CM

## 2019-10-31 DIAGNOSIS — R93.2 ABNORMAL FINDINGS ON DIAGNOSTIC IMAGING OF LIVER AND BILIARY TRACT: ICD-10-CM

## 2019-10-31 DIAGNOSIS — J44.9 CHRONIC OBSTRUCTIVE PULMONARY DISEASE, UNSPECIFIED: ICD-10-CM

## 2019-10-31 DIAGNOSIS — Z79.84 LONG TERM (CURRENT) USE OF ORAL HYPOGLYCEMIC DRUGS: ICD-10-CM

## 2019-10-31 DIAGNOSIS — K31.89 OTHER DISEASES OF STOMACH AND DUODENUM: ICD-10-CM

## 2019-10-31 DIAGNOSIS — F31.9 BIPOLAR DISORDER, UNSPECIFIED: ICD-10-CM

## 2019-10-31 DIAGNOSIS — D50.9 IRON DEFICIENCY ANEMIA, UNSPECIFIED: ICD-10-CM

## 2019-10-31 DIAGNOSIS — F41.9 ANXIETY DISORDER, UNSPECIFIED: ICD-10-CM

## 2019-10-31 DIAGNOSIS — R06.00 DYSPNEA, UNSPECIFIED: ICD-10-CM

## 2019-11-12 NOTE — ED ADULT NURSE NOTE - NEURO WDL
Alert and oriented to person, place and time, memory intact, behavior appropriate to situation, PERRL.
Patient is not pregnant (male or female)

## 2020-01-06 ENCOUNTER — INPATIENT (INPATIENT)
Facility: HOSPITAL | Age: 60
LOS: 2 days | Discharge: HOME | DRG: 683 | End: 2020-01-09
Attending: HOSPITALIST | Admitting: HOSPITALIST
Payer: COMMERCIAL

## 2020-01-06 VITALS
HEART RATE: 93 BPM | TEMPERATURE: 98 F | OXYGEN SATURATION: 100 % | SYSTOLIC BLOOD PRESSURE: 90 MMHG | DIASTOLIC BLOOD PRESSURE: 65 MMHG | RESPIRATION RATE: 18 BRPM

## 2020-01-06 DIAGNOSIS — I10 ESSENTIAL (PRIMARY) HYPERTENSION: ICD-10-CM

## 2020-01-06 DIAGNOSIS — E87.1 HYPO-OSMOLALITY AND HYPONATREMIA: ICD-10-CM

## 2020-01-06 DIAGNOSIS — J44.9 CHRONIC OBSTRUCTIVE PULMONARY DISEASE, UNSPECIFIED: ICD-10-CM

## 2020-01-06 DIAGNOSIS — N17.9 ACUTE KIDNEY FAILURE, UNSPECIFIED: ICD-10-CM

## 2020-01-06 DIAGNOSIS — Z98.84 BARIATRIC SURGERY STATUS: Chronic | ICD-10-CM

## 2020-01-06 DIAGNOSIS — G47.33 OBSTRUCTIVE SLEEP APNEA (ADULT) (PEDIATRIC): ICD-10-CM

## 2020-01-06 DIAGNOSIS — F31.9 BIPOLAR DISORDER, UNSPECIFIED: ICD-10-CM

## 2020-01-06 DIAGNOSIS — D64.9 ANEMIA, UNSPECIFIED: ICD-10-CM

## 2020-01-06 DIAGNOSIS — R19.7 DIARRHEA, UNSPECIFIED: ICD-10-CM

## 2020-01-06 DIAGNOSIS — E86.0 DEHYDRATION: ICD-10-CM

## 2020-01-06 DIAGNOSIS — E11.9 TYPE 2 DIABETES MELLITUS WITHOUT COMPLICATIONS: ICD-10-CM

## 2020-01-06 LAB
ALBUMIN SERPL ELPH-MCNC: 2.9 G/DL — LOW (ref 3.3–5)
ALP SERPL-CCNC: 83 U/L — SIGNIFICANT CHANGE UP (ref 40–120)
ALT FLD-CCNC: 12 U/L — SIGNIFICANT CHANGE UP (ref 12–78)
ANION GAP SERPL CALC-SCNC: 7 MMOL/L — SIGNIFICANT CHANGE UP (ref 5–17)
APTT BLD: 26.7 SEC — LOW (ref 27.5–36.3)
AST SERPL-CCNC: 7 U/L — LOW (ref 15–37)
BASOPHILS # BLD AUTO: 0 K/UL — SIGNIFICANT CHANGE UP (ref 0–0.2)
BASOPHILS NFR BLD AUTO: 0 % — SIGNIFICANT CHANGE UP (ref 0–2)
BILIRUB SERPL-MCNC: 0.4 MG/DL — SIGNIFICANT CHANGE UP (ref 0.2–1.2)
BUN SERPL-MCNC: 44 MG/DL — HIGH (ref 7–23)
CALCIUM SERPL-MCNC: 8.3 MG/DL — LOW (ref 8.5–10.1)
CHLORIDE SERPL-SCNC: 96 MMOL/L — SIGNIFICANT CHANGE UP (ref 96–108)
CO2 SERPL-SCNC: 25 MMOL/L — SIGNIFICANT CHANGE UP (ref 22–31)
CREAT SERPL-MCNC: 2.28 MG/DL — HIGH (ref 0.5–1.3)
EOSINOPHIL # BLD AUTO: 0.33 K/UL — SIGNIFICANT CHANGE UP (ref 0–0.5)
EOSINOPHIL NFR BLD AUTO: 3 % — SIGNIFICANT CHANGE UP (ref 0–6)
GLUCOSE SERPL-MCNC: 382 MG/DL — HIGH (ref 70–99)
HCT VFR BLD CALC: 33.4 % — LOW (ref 34.5–45)
HGB BLD-MCNC: 10.9 G/DL — LOW (ref 11.5–15.5)
INR BLD: 1.2 RATIO — HIGH (ref 0.88–1.16)
LYMPHOCYTES # BLD AUTO: 2.54 K/UL — SIGNIFICANT CHANGE UP (ref 1–3.3)
LYMPHOCYTES # BLD AUTO: 23 % — SIGNIFICANT CHANGE UP (ref 13–44)
MCHC RBC-ENTMCNC: 27.9 PG — SIGNIFICANT CHANGE UP (ref 27–34)
MCHC RBC-ENTMCNC: 32.6 GM/DL — SIGNIFICANT CHANGE UP (ref 32–36)
MCV RBC AUTO: 85.6 FL — SIGNIFICANT CHANGE UP (ref 80–100)
MONOCYTES # BLD AUTO: 0.77 K/UL — SIGNIFICANT CHANGE UP (ref 0–0.9)
MONOCYTES NFR BLD AUTO: 7 % — SIGNIFICANT CHANGE UP (ref 2–14)
NEUTROPHILS # BLD AUTO: 7.3 K/UL — SIGNIFICANT CHANGE UP (ref 1.8–7.4)
NEUTROPHILS NFR BLD AUTO: 66 % — SIGNIFICANT CHANGE UP (ref 43–77)
NRBC # BLD: SIGNIFICANT CHANGE UP /100 WBCS (ref 0–0)
PLATELET # BLD AUTO: 262 K/UL — SIGNIFICANT CHANGE UP (ref 150–400)
POTASSIUM SERPL-MCNC: 3.9 MMOL/L — SIGNIFICANT CHANGE UP (ref 3.5–5.3)
POTASSIUM SERPL-SCNC: 3.9 MMOL/L — SIGNIFICANT CHANGE UP (ref 3.5–5.3)
PROT SERPL-MCNC: 7.1 GM/DL — SIGNIFICANT CHANGE UP (ref 6–8.3)
PROTHROM AB SERPL-ACNC: 13.4 SEC — HIGH (ref 10–12.9)
RAPID RVP RESULT: DETECTED
RBC # BLD: 3.9 M/UL — SIGNIFICANT CHANGE UP (ref 3.8–5.2)
RBC # FLD: 11.9 % — SIGNIFICANT CHANGE UP (ref 10.3–14.5)
RV+EV RNA SPEC QL NAA+PROBE: DETECTED
SODIUM SERPL-SCNC: 128 MMOL/L — LOW (ref 135–145)
TROPONIN I SERPL-MCNC: <0.015 NG/ML — SIGNIFICANT CHANGE UP (ref 0.01–0.04)
WBC # BLD: 11.06 K/UL — HIGH (ref 3.8–10.5)
WBC # FLD AUTO: 11.06 K/UL — HIGH (ref 3.8–10.5)

## 2020-01-06 PROCEDURE — 85027 COMPLETE CBC AUTOMATED: CPT

## 2020-01-06 PROCEDURE — 94640 AIRWAY INHALATION TREATMENT: CPT

## 2020-01-06 PROCEDURE — 87581 M.PNEUMON DNA AMP PROBE: CPT

## 2020-01-06 PROCEDURE — 71045 X-RAY EXAM CHEST 1 VIEW: CPT | Mod: 26

## 2020-01-06 PROCEDURE — 83036 HEMOGLOBIN GLYCOSYLATED A1C: CPT

## 2020-01-06 PROCEDURE — 85025 COMPLETE CBC W/AUTO DIFF WBC: CPT

## 2020-01-06 PROCEDURE — 87798 DETECT AGENT NOS DNA AMP: CPT

## 2020-01-06 PROCEDURE — 93306 TTE W/DOPPLER COMPLETE: CPT

## 2020-01-06 PROCEDURE — 81001 URINALYSIS AUTO W/SCOPE: CPT

## 2020-01-06 PROCEDURE — 87486 CHLMYD PNEUM DNA AMP PROBE: CPT

## 2020-01-06 PROCEDURE — 82962 GLUCOSE BLOOD TEST: CPT

## 2020-01-06 PROCEDURE — 87507 IADNA-DNA/RNA PROBE TQ 12-25: CPT

## 2020-01-06 PROCEDURE — 93010 ELECTROCARDIOGRAM REPORT: CPT

## 2020-01-06 PROCEDURE — 87633 RESP VIRUS 12-25 TARGETS: CPT

## 2020-01-06 PROCEDURE — 74176 CT ABD & PELVIS W/O CONTRAST: CPT

## 2020-01-06 PROCEDURE — 87086 URINE CULTURE/COLONY COUNT: CPT

## 2020-01-06 PROCEDURE — 80048 BASIC METABOLIC PNL TOTAL CA: CPT

## 2020-01-06 PROCEDURE — 87493 C DIFF AMPLIFIED PROBE: CPT

## 2020-01-06 PROCEDURE — 36415 COLL VENOUS BLD VENIPUNCTURE: CPT

## 2020-01-06 RX ORDER — ONDANSETRON 8 MG/1
4 TABLET, FILM COATED ORAL EVERY 8 HOURS
Refills: 0 | Status: DISCONTINUED | OUTPATIENT
Start: 2020-01-06 | End: 2020-01-07

## 2020-01-06 RX ORDER — DEXTROSE 50 % IN WATER 50 %
12.5 SYRINGE (ML) INTRAVENOUS ONCE
Refills: 0 | Status: DISCONTINUED | OUTPATIENT
Start: 2020-01-06 | End: 2020-01-09

## 2020-01-06 RX ORDER — PANTOPRAZOLE SODIUM 20 MG/1
40 TABLET, DELAYED RELEASE ORAL
Refills: 0 | Status: DISCONTINUED | OUTPATIENT
Start: 2020-01-06 | End: 2020-01-09

## 2020-01-06 RX ORDER — GLUCAGON INJECTION, SOLUTION 0.5 MG/.1ML
1 INJECTION, SOLUTION SUBCUTANEOUS ONCE
Refills: 0 | Status: DISCONTINUED | OUTPATIENT
Start: 2020-01-06 | End: 2020-01-09

## 2020-01-06 RX ORDER — CITALOPRAM 10 MG/1
20 TABLET, FILM COATED ORAL
Refills: 0 | Status: COMPLETED | OUTPATIENT
Start: 2020-01-06 | End: 2020-01-06

## 2020-01-06 RX ORDER — PREGABALIN 225 MG/1
500 CAPSULE ORAL DAILY
Refills: 0 | Status: DISCONTINUED | OUTPATIENT
Start: 2020-01-06 | End: 2020-01-09

## 2020-01-06 RX ORDER — DEXTROSE 50 % IN WATER 50 %
25 SYRINGE (ML) INTRAVENOUS ONCE
Refills: 0 | Status: DISCONTINUED | OUTPATIENT
Start: 2020-01-06 | End: 2020-01-09

## 2020-01-06 RX ORDER — INSULIN LISPRO 100/ML
VIAL (ML) SUBCUTANEOUS
Refills: 0 | Status: DISCONTINUED | OUTPATIENT
Start: 2020-01-06 | End: 2020-01-09

## 2020-01-06 RX ORDER — SODIUM CHLORIDE 9 MG/ML
1000 INJECTION, SOLUTION INTRAVENOUS
Refills: 0 | Status: DISCONTINUED | OUTPATIENT
Start: 2020-01-06 | End: 2020-01-09

## 2020-01-06 RX ORDER — HYDROXYZINE HCL 10 MG
50 TABLET ORAL
Refills: 0 | Status: DISCONTINUED | OUTPATIENT
Start: 2020-01-06 | End: 2020-01-09

## 2020-01-06 RX ORDER — DEXTROSE 50 % IN WATER 50 %
15 SYRINGE (ML) INTRAVENOUS ONCE
Refills: 0 | Status: DISCONTINUED | OUTPATIENT
Start: 2020-01-06 | End: 2020-01-09

## 2020-01-06 RX ORDER — INSULIN GLARGINE 100 [IU]/ML
20 INJECTION, SOLUTION SUBCUTANEOUS AT BEDTIME
Refills: 0 | Status: DISCONTINUED | OUTPATIENT
Start: 2020-01-06 | End: 2020-01-08

## 2020-01-06 RX ORDER — ARIPIPRAZOLE 15 MG/1
30 TABLET ORAL AT BEDTIME
Refills: 0 | Status: DISCONTINUED | OUTPATIENT
Start: 2020-01-06 | End: 2020-01-09

## 2020-01-06 RX ORDER — DEXTROAMPHETAMINE SACCHARATE, AMPHETAMINE ASPARTATE, DEXTROAMPHETAMINE SULFATE AND AMPHETAMINE SULFATE 1.875; 1.875; 1.875; 1.875 MG/1; MG/1; MG/1; MG/1
1 TABLET ORAL
Qty: 0 | Refills: 0 | DISCHARGE

## 2020-01-06 RX ORDER — ALBUTEROL 90 UG/1
2 AEROSOL, METERED ORAL EVERY 6 HOURS
Refills: 0 | Status: DISCONTINUED | OUTPATIENT
Start: 2020-01-06 | End: 2020-01-09

## 2020-01-06 RX ORDER — SODIUM CHLORIDE 9 MG/ML
1000 INJECTION INTRAMUSCULAR; INTRAVENOUS; SUBCUTANEOUS ONCE
Refills: 0 | Status: COMPLETED | OUTPATIENT
Start: 2020-01-06 | End: 2020-01-06

## 2020-01-06 RX ORDER — FERROUS SULFATE 325(65) MG
325 TABLET ORAL AT BEDTIME
Refills: 0 | Status: DISCONTINUED | OUTPATIENT
Start: 2020-01-06 | End: 2020-01-09

## 2020-01-06 RX ORDER — ALPRAZOLAM 0.25 MG
2 TABLET ORAL
Refills: 0 | Status: DISCONTINUED | OUTPATIENT
Start: 2020-01-06 | End: 2020-01-07

## 2020-01-06 RX ORDER — VENLAFAXINE HCL 75 MG
150 CAPSULE, EXT RELEASE 24 HR ORAL DAILY
Refills: 0 | Status: DISCONTINUED | OUTPATIENT
Start: 2020-01-06 | End: 2020-01-09

## 2020-01-06 RX ORDER — BUDESONIDE AND FORMOTEROL FUMARATE DIHYDRATE 160; 4.5 UG/1; UG/1
2 AEROSOL RESPIRATORY (INHALATION)
Refills: 0 | Status: DISCONTINUED | OUTPATIENT
Start: 2020-01-06 | End: 2020-01-09

## 2020-01-06 RX ORDER — INSULIN LISPRO 100/ML
VIAL (ML) SUBCUTANEOUS AT BEDTIME
Refills: 0 | Status: DISCONTINUED | OUTPATIENT
Start: 2020-01-06 | End: 2020-01-09

## 2020-01-06 RX ORDER — BENZOCAINE AND MENTHOL 5; 1 G/100ML; G/100ML
1 LIQUID ORAL
Refills: 0 | Status: DISCONTINUED | OUTPATIENT
Start: 2020-01-06 | End: 2020-01-09

## 2020-01-06 RX ORDER — RISPERIDONE 4 MG/1
2 TABLET ORAL
Refills: 0 | Status: DISCONTINUED | OUTPATIENT
Start: 2020-01-06 | End: 2020-01-09

## 2020-01-06 RX ORDER — LOSARTAN POTASSIUM 100 MG/1
100 TABLET, FILM COATED ORAL DAILY
Refills: 0 | Status: DISCONTINUED | OUTPATIENT
Start: 2020-01-06 | End: 2020-01-06

## 2020-01-06 RX ORDER — HEPARIN SODIUM 5000 [USP'U]/ML
5000 INJECTION INTRAVENOUS; SUBCUTANEOUS THREE TIMES A DAY
Refills: 0 | Status: DISCONTINUED | OUTPATIENT
Start: 2020-01-06 | End: 2020-01-09

## 2020-01-06 RX ORDER — VENLAFAXINE HCL 75 MG
75 CAPSULE, EXT RELEASE 24 HR ORAL AT BEDTIME
Refills: 0 | Status: DISCONTINUED | OUTPATIENT
Start: 2020-01-06 | End: 2020-01-09

## 2020-01-06 RX ORDER — SODIUM CHLORIDE 9 MG/ML
1000 INJECTION INTRAMUSCULAR; INTRAVENOUS; SUBCUTANEOUS
Refills: 0 | Status: DISCONTINUED | OUTPATIENT
Start: 2020-01-06 | End: 2020-01-08

## 2020-01-06 RX ADMIN — SODIUM CHLORIDE 1000 MILLILITER(S): 9 INJECTION INTRAMUSCULAR; INTRAVENOUS; SUBCUTANEOUS at 19:53

## 2020-01-06 RX ADMIN — SODIUM CHLORIDE 1000 MILLILITER(S): 9 INJECTION INTRAMUSCULAR; INTRAVENOUS; SUBCUTANEOUS at 18:53

## 2020-01-06 NOTE — PHARMACOTHERAPY INTERVENTION NOTE - COMMENTS
med history complete, reviewed medications with patient and family confirmed with doctor first med profile

## 2020-01-06 NOTE — ED STATDOCS - PROGRESS NOTE DETAILS
Rj BARROSO for ED attending, Dr. Erickson: 58 y/o female with PMHx of COPD, HTN, DM 2, anemia, and bipolar presents ambulatory to the ED on home O2 4LPM s/p +syncope at work today. Was sitting when she felt +fatigue and syncopized. Notes she has been having +diarrhea since 12/27 and is on medication unable to recall which. Had vomiting that resolved, hasn't vomited since 12/30. Also notes +SOB more than usual. Denies chest pain. Will send pt to main ED for further evaluation.

## 2020-01-06 NOTE — ED ADULT NURSE NOTE - NSIMPLEMENTINTERV_GEN_ALL_ED
Implemented All Fall with Harm Risk Interventions:  [place] to call system. Call bell, personal items and telephone within reach. Instruct patient to call for assistance. Room bathroom lighting operational. Non-slip footwear when patient is off stretcher. Physically safe environment: no spills, clutter or unnecessary equipment. Stretcher in lowest position, wheels locked, appropriate side rails in place. Provide visual cue, wrist band, yellow gown, etc. Monitor gait and stability. Monitor for mental status changes and reorient to person, place, and time. Review medications for side effects contributing to fall risk. Reinforce activity limits and safety measures with patient and family. Provide visual clues: red socks.

## 2020-01-06 NOTE — ED ADULT NURSE NOTE - OBJECTIVE STATEMENT
Pt ambulatory to triage with home oxygen and states she syncopized at work. Pt reports having a "stomach bug" with vomiting and diarrhea since 12/27. To intake for EKG. pt on 3 LO2 at home. denies dizziness, states she just feels tired and weak

## 2020-01-06 NOTE — ED PROVIDER NOTE - OBJECTIVE STATEMENT
58 y/o female with PMHx of COPD, HTN, DM 2, anemia, and bipolar presents ambulatory to the ED on home O2 4LPM s/p syncope at work today. Was sitting when she felt fatigued and syncopized. Notes she has been having +diarrhea since 12/27 and is on medication unable to recall which. Had vomiting that resolved, hasn't vomited since 12/30 from recent stomach bug. Also notes +SOB more than usual. +Intermittent vomiting. Denies chest pain. NKDA. PMD: Jak Castillo.

## 2020-01-06 NOTE — H&P ADULT - NEGATIVE CARDIOVASCULAR SYMPTOMS
PT Evaluation     Today's date: 2019  Patient name: Steve Nichoel  : 1962  MRN: [id number]  Referring provider: Deb Menchaca MD  Dx:   Encounter Diagnosis     ICD-10-CM    1  Shoulder tendonitis, left M75 82    2  Acute pain of left shoulder M25 512               Pt treated by MNADO, JOAN under direct supervision of RS, DPT         Assessment  Assessment details: Rebecca Dexter is a 56 y o female reporting as an outpatient to [facility] with c/o L shoulder pain and dx of shoulder tendonitis  In addition to pain, patient presents with significantly decreased ROM, decreased strength, ADL intolerance, and hypertonicity of surrounding musculature significantly limiting max function  Pt would benefit from skilled therapy to address above limitations and reach desired goals in PT  Thank you for the referral of this patient  Impairments: abnormal muscle tone, abnormal or restricted ROM, impaired physical strength, lacks appropriate home exercise program, pain with function and poor posture   Barriers to therapy: High deductible- may limit duration and frequency of treatment  Understanding of Dx/Px/POC: good   Prognosis: good  Prognosis details: Short Term Goals:  1  Pt will be independent in HEP in 1-2 visits  2  Pain will be decreased to 5/10 worst severity in 4-6 weeks  3  Pt will increase AROM by 25% in 4-6 weeks  Long term Goals:   1  Pt will be able to achieve muscle strength of 5/5 by d/c   2  Pt will be able to tolerate overhead activity with 1-2/10 at worst pain by d/c   3  Pt will be able to put her seatbelt on and close car door with 1-2/10 at worst pain by d/c   4  Pt will achieve AROM WFL by d/c    5  Pain will be decreased to 1-2/10 worst severity by d/c         Plan  Patient would benefit from: skilled physical therapy  Planned modality interventions: ultrasound, thermotherapy: hydrocollator packs, cryotherapy and TENS  Planned therapy interventions: ADL training, manual therapy, joint mobilization, neuromuscular re-education, functional ROM exercises, home exercise program, therapeutic exercise, therapeutic activities, strengthening and postural training  Frequency: 1-2x/week  Duration in weeks: 8  Plan of Care beginning date: 2019  Plan of Care expiration date: 2019  Treatment plan discussed with: patient        Subjective Evaluation    History of Present Illness  Mechanism of injury: Pt reports onset of pain ~ 8 months ago, worsening within the last ~2 months   Pt states she has received 2 cortisone injections with mild improvement for short duration (days) before pain returned to previous severity  She reports the last injection was ~3 weeks ago  Pt states previous cortisone injections of other areas were beneficial for her pain, but most recent injections for her shoulder were not  Pt denies any hx of trauma/IOANA  Pt denies any numbness/tingling down her arms  She reports a hx of RA that she believes may be contributing to the pain  Pt reports that she utilizes her R UE to complete most activities and often avoids using her LUE  Pt reports difficulty with IADL's, including washing/styling her hair and reaching up  Pain  Pain scale: "0 5" up into neck  At worst pain ratin (with overhead activity and by end of the day  )  Location: Down lateral aspect of bicep/lat delt, anterior shoulder, posterior shoulder, L c/s, L UT    Relieving factors: medications, ice and support (Sleeping with arm support  Swimming   Analgesic topical cream  magnesium rub and frankensence 'takes the edge off')  Exacerbated by: Sleep (side sleeper), pulling seatbelt across body, closing car door, reaching/lifting up to eye level     Social Support  Lives with: young children    Employment status: not working  Hand dominance: right      Diagnostic Tests  No diagnostic tests performed  Treatments  Previous treatment: injection treatment (Pt states she received accupuncture with relief previously; Only ~3 day relief with injection  )  Patient Goals  Patient goals for therapy: decreased pain and increased motion  Patient's goals regarding treatment: Be able to reach over head, pull seatbelt on and close car door  Objective     Active Range of Motion   Left Shoulder   Flexion: 62 degrees with pain  Abduction: 49 degrees with pain  External rotation 45°: 5 degrees with pain  Internal rotation 45°: 52 degrees     Passive Range of Motion   Left Shoulder   Flexion: 62 degrees with pain  Abduction: 66 degrees with pain  External rotation 45°: 5 degrees with pain  Internal rotation 45°: 52 degrees with pain    Strength/Myotome Testing     Left Shoulder     Planes of Motion   Flexion: 4- (*pain in R ant/lat shoulder)   Abduction: 4- (*pain in R ant/lat shoulder)   External rotation at 0°: 4- (*pain)   External rotation at 45°: 3+ (*pain)   Internal rotation at 45°: 5 (*pain in R shoulder)     General Comments:      Shoulder Comments   Postural Observation:  Static posture: Forward head, rounded shoulders  Cervical Screen:  Special tests:  Spurling: Negative   Sharp Jill: Negative pain, negative laxity  VBI: Negative    Cervical ROM:  Flexion: 75%  Extension: 75%  R Rot: 75%  L Rot: 50% w/ pain/tightness in L Shoulder  Shoulder Special Tests:  Stefany Short (L): Positive for pain  Speed: Positive for pain  Yeargeson: positive for pain   AC shear: Negative for pain  Tenderness around palpation point  Brendan's: Positive left sided restriction with b/l rotation  Palpation:  Hypertonicity: B/L UT, Lat deltoid, biceps  Tender to palpation along L bicipital groove  Precautions: Asthma (has inhaler), RA   EPOC: 9/18/2019     Daily Treatment Diary      Manual  7/24          L shoulder PROM NV          L GHJ mobs NV          L Scapular PROM NV          L 1 rib mob  RS          TPR L UT/Lat delt/biceps NV                     Progress Note           Total time 3'              Exercise Diary  7/24      HEP instruct and review  5'      Arm Delvin's: flexion/scap  (may not be able to tolerate initially)       Pendulums m/l and a/p        Posterior shoulder rolls       scap squeeze       Table slides flex/scap       Supine AAROM flex/ER       Standing Cane IR/ext       Std flex/scap              S/l IR/ER w/ towel roll       S/l abd AROM        scap punches       Flexion centering       Prone mod row       Prone mod ext       Prone mod abd                                                              Modalities  7/24       CP 10'       consider pulsed ultrasound no chest pain

## 2020-01-06 NOTE — ED PROVIDER NOTE - NS_ ATTENDINGSCRIBEDETAILS _ED_A_ED_FT
I, Bal Guzman MD,  performed the initial face to face bedside interview with this patient regarding history of present illness, review of symptoms and relevant past medical, social and family history.  I completed an independent physical examination.    The history, relevant review of systems, past medical and surgical history, medical decision making, and physical examination was documented by the scribe in my presence and I attest to the accuracy of the documentation.

## 2020-01-06 NOTE — H&P ADULT - NSHPSOCIALHISTORY_GEN_ALL_CORE
Pt lives at home with her  and their youngest daughter. She works as a . She denies any tobacco, alcohol or illicit drug use.

## 2020-01-06 NOTE — ED ADULT TRIAGE NOTE - CHIEF COMPLAINT QUOTE
Pt ambulatory to triage with home oxygen and states she syncopized at work. Pt reports having a "stomach bug" with vomiting and diarrhea since 12/27. To intake for EKG

## 2020-01-06 NOTE — ED STATDOCS - NS_ATTENDINGSCRIBE_ED_ALL_ED
I personally performed the service described in the documentation recorded by the scribe in my presence, and it accurately and completely records my words and actions.
148

## 2020-01-06 NOTE — H&P ADULT - NSICDXFAMILYHX_GEN_ALL_CORE_FT
FAMILY HISTORY:  Family history of heart disease, father  Family history of leukemia, mother and two cousins, all now

## 2020-01-06 NOTE — H&P ADULT - HISTORY OF PRESENT ILLNESS
58 yo obese F with PMHx significant for COPD (dependent on 4L O2 via NC at home), pulmonary HTN, DM, COLLEEN on CPAP, and HTN presents with 2 day history of severe fatigue and weakness preceding a syncopal episode on morning of admission. 58 yo obese F with PMHx significant for COPD (dependent on 4L O2 via NC at home), pulmonary HTN, DM, COLLEEN on CPAP, and HTN presents with 2 day history of severe fatigue and weakness preceding a syncopal episode on morning of admission. Pt works as a . Pt was using the computer when she passed out with a  period of LOC for an unknown period. She recalls being awoken by her  and she was advised to leave work and be evaluated. She returned to work for the first time in over 2 weeks and describes feeling very tired. She was off from work initially for the holidays, but then became ill. She was unable to return to work this past Thursday 1/2/20 because she felt too weak and fatigued. Pt began to experience nausea and vomiting with associated chills on 12/28/19. She vomited intermittently, worsened if she had food. She had approx 8 diarrheal BMs daily. Symptoms subsided after about 4 days.  Vomiting has since resolved. Diarrhea still persists, but significantly reduced. She regained her appetite on day of admission. Pt denies any sick contacts or recent travel. She has had some myalgias but denies any abdominal pain, cough or chest pain. Pt has experienced some palpitations.    In the ED, pt was hypotensive to 90/65. She was bolused 1L NS. RVP panel tested positive for entero/rhinovirus.

## 2020-01-06 NOTE — H&P ADULT - RS GEN PE MLT RESP DETAILS PC
no rhonchi/no wheezes/clear to auscultation bilaterally/airway patent/breath sounds equal/good air movement/no rales/respirations non-labored

## 2020-01-06 NOTE — H&P ADULT - NSHPPHYSICALEXAM_GEN_ALL_CORE
Vital Signs Last 24 Hrs  T(C): 36.6 (06 Jan 2020 21:43), Max: 36.6 (06 Jan 2020 17:19)  T(F): 97.9 (06 Jan 2020 21:43), Max: 97.9 (06 Jan 2020 21:43)  HR: 87 (06 Jan 2020 21:43) (87 - 93)  BP: 116/58 (06 Jan 2020 21:43) (90/65 - 116/58)  BP(mean): 74 (06 Jan 2020 17:19) (74 - 74)  RR: 17 (06 Jan 2020 21:43) (17 - 18)  SpO2: 100% (06 Jan 2020 21:43) (100% - 100%)

## 2020-01-06 NOTE — H&P ADULT - ATTENDING COMMENTS
59 year old female  patient with syncopal episode    -Admit to tele    #syncope  -DDX: vasovagal, orthostatic, mechanical(cardiac) arrhythmias  -orthostatic syncope likely possibility  -get orthostatic vitals  -IVF hydration  -monitor on tele  -get morning echo  -cardiology consult    #Leukocytosis   -likely reactive  -no focal signs of infection  #Anemia  -stable and around baseline  -trend cbc    #Hyponatremia  -pt currently asymptomatic  -IVF hydration    #ADINA  -likely pre-renal  -IVF hydration  -trend bmp    #HTN  -on losartan    #DM2  -hold oral hypoglycemic  -ISS    #Bipolar  -on abilify and celexa    #DVT ppx  -ambulation

## 2020-01-06 NOTE — H&P ADULT - ASSESSMENT
58 yo F as described above presenting with severe fatigue preceding syncopal event admitted for ADINA secondary to severe dehydration.    - Admit to Med- Surg    #ADINA  #Dehydration  - Hypovolemic on exam  - Severe dehydration in setting of GI losses and poor PO intake  - Cr 2.28 on admission (baseline 0.8-1.1)  - IV hydration  - Encourage increased PO intake  - Trend Cr    #Hyponatremia  - Hypovolemic hyponatremia  - Na 128 on admission, asymptomatic  - Likely due to dehydration in setting of elevated glucose (382)  - IVF with NS@75cc/hr  - Tighter glucose control  - Trend Na    #Upper Respiratory Infection  - RVP panel positive for entero/rhinovirus 60 yo F as described above presenting with severe fatigue preceding syncopal event admitted for ADINA secondary to severe dehydration.    - Admit to Med- Surg    #ADINA  #Dehydration  - Hypovolemic on exam  - Prerenal azotemia   - Severe dehydration in setting of GI losses and poor PO intake  - Cr 2.28 on admission (baseline 0.8-1.1)  - IV hydration  - Encourage increased PO intake  - Zofran prn  - Avoid nephrotoxic agents  - Trend Cr    #S/P Syncope  - Likely related to hypovolemic state  - EKG reviewed  - Trop x 1 neg    #Hyponatremia  - Hypovolemic hyponatremia  - Na 128 on admission, asymptomatic  - Likely due to dehydration in setting of elevated glucose (382)  - IVF with NS@75cc/hr  - Tighter glucose control  - Trend Na    #Upper Respiratory Infection  - RVP panel positive for entero/rhinovirus  - Supportive therapy  - Droplet precautions  - Cepacol prn    #Diarrhea  - Likely related to viral syndrome   - F/U GI PCR  - Trend electrolytes  - IVF    #Leukocytosis  - WBC mildly elevated to 11.06K  - Likely related to acute viral syndrome  - F/U CXR  - F/U UA  - F/U AM CBC    #Normocytic anemia  - Chronic, possibly related to hx of gastric bypass surgery (2007)  - On iron and B12 supplementation    #Diabetes mellitus  - Gluc 382 on admission  - Off of oral anti DM meds for several days due to poor PO intake  - Hold oral anti-DM meds while inpatient  - Lantus 20 U qHS  - ISS  - BGMs  - Carb consistent diet  - F/U AM HgbA1c. Titrate above regimen accordingly    #HTN  - Normotensive  - Hold losartan for now in light of ADINA  - Monitor BP    #COLLEEN  - On CPAP at bedtime    #COPD  - stable  - Supplemental O2  - Continue inhalers  - Duonebs prn    #Pulmonary HTN  - moderate pulm HTN noted on ECHO (Nov 2018)  - Likely related to underlying COPD, COLLEEN    #Bipolar disorder  - Stable   - Cont home meds    #DVT PPx  - Heparin SQ    #Advanced Directives  - Discussed with patient at bedside  - FULL CODE  - HCP : Daughter Ronit 58 yo F as described above presenting with severe fatigue preceding syncopal event admitted for ADINA secondary to severe dehydration.    - Admit to Med- Surg    #ADINA  #Dehydration  - Hypovolemic on exam  - Prerenal azotemia   - Severe dehydration in setting of GI losses and poor PO intake  - Cr 2.28 on admission (baseline 0.8-1.1)  - IV hydration  - Encourage increased PO intake  - Zofran prn  - Avoid nephrotoxic agents  - Trend Cr    #S/P Syncope  - Likely related to hypovolemic state  - EKG reviewed  - Trop x 1 neg    #Hyponatremia  - Hypovolemic hyponatremia  - Na 128 on admission, asymptomatic  - Likely due to dehydration in setting of elevated glucose (382)  - IVF with NS@75cc/hr  - Tighter glucose control  - Trend Na    #Upper Respiratory Infection  - RVP panel positive for entero/rhinovirus  - Supportive therapy  - Droplet precautions  - Cepacol prn    #Diarrhea  - Likely related to viral syndrome   - F/U GI PCR  - Trend electrolytes  - IVF    #Leukocytosis  - WBC mildly elevated to 11.06K  - Likely related to acute viral syndrome  - F/U CXR  - F/U UA  - F/U AM CBC    #Normocytic anemia  - Chronic, possibly related to hx of gastric bypass surgery (2007)  - On iron and B12 supplementation  - Last colonoscopy was last year, normal as per patient    #Diabetes mellitus  - Gluc 382 on admission  - Off of oral anti DM meds for several days due to poor PO intake  - Hold oral anti-DM meds while inpatient  - Lantus 20 U qHS  - ISS  - BGMs  - Carb consistent diet  - F/U AM HgbA1c. Titrate above regimen accordingly    #HTN  - Normotensive  - Hold losartan for now in light of ADINA  - Monitor BP    #COLLEEN  - On CPAP at bedtime    #COPD  - stable  - Supplemental O2  - Continue inhalers  - Duonebs prn    #Pulmonary HTN  - moderate pulm HTN noted on ECHO (Nov 2018)  - Likely related to underlying COPD, COLLEEN    #Bipolar disorder  - Stable   - Cont home meds    #DVT PPx  - Heparin SQ    #Advanced Directives  - Discussed with patient at bedside  - FULL CODE  - HCP : Daughter Ronit 58 yo F as described above presenting with severe fatigue preceding syncopal event admitted for ADINA secondary to severe dehydration.    - Admit to TELE    #ADINA  #Dehydration  - Hypovolemic on exam  - Prerenal azotemia   - Severe dehydration in setting of GI losses and poor PO intake  - Cr 2.28 on admission (baseline 0.8-1.1)  - IV hydration  - Encourage increased PO intake  - Zofran prn  - Avoid nephrotoxic agents  - Trend Cr    #S/P Syncope  - Likely related to hypovolemic state  - EKG reviewed  - Telemetry monitoring  - F/U Cardio  - Trop x 1 neg    #Hyponatremia  - Hypovolemic hyponatremia  - Na 128 on admission, asymptomatic  - Likely due to dehydration in setting of elevated glucose (382)  - IVF with NS@75cc/hr  - Tighter glucose control  - Trend Na    #Upper Respiratory Infection  - RVP panel positive for entero/rhinovirus  - Supportive therapy  - Droplet precautions  - Cepacol prn    #Diarrhea  - Likely related to viral syndrome   - F/U GI PCR  - Trend electrolytes  - IVF    #Leukocytosis  - WBC mildly elevated to 11.06K  - Likely related to acute viral syndrome  - F/U CXR  - F/U UA  - F/U AM CBC    #Normocytic anemia  - Chronic, possibly related to hx of gastric bypass surgery (2007)  - On iron and B12 supplementation  - Last colonoscopy was last year, normal as per patient    #Diabetes mellitus  - Gluc 382 on admission  - Off of oral anti DM meds for several days due to poor PO intake  - Hold oral anti-DM meds while inpatient  - Lantus 20 U qHS  - ISS  - BGMs  - Carb consistent diet  - F/U AM HgbA1c. Titrate above regimen accordingly    #HTN  - Normotensive  - Hold losartan for now in light of ADINA  - Monitor BP    #COLLEEN  - On CPAP at bedtime    #COPD  - stable  - Supplemental O2  - Continue inhalers  - Duonebs prn    #Pulmonary HTN  - moderate pulm HTN noted on ECHO (Nov 2018)  - Likely related to underlying COPD, COLLEEN    #Bipolar disorder  - Stable   - Cont home meds    #DVT PPx  - Heparin SQ    #Advanced Directives  - Discussed with patient at bedside  - FULL CODE  - HCP : Daughter Ronit 58 yo F as described above presenting with severe fatigue preceding syncopal event admitted for ADINA secondary to severe dehydration.    - Admit to TELE    #ADINA  #Dehydration  - Hypovolemic on exam  - Prerenal azotemia   - Severe dehydration in setting of GI losses and poor PO intake  - Cr 2.28 on admission (baseline 0.8-1.1)  - IV hydration  - Encourage increased PO intake  - Zofran prn  - Avoid nephrotoxic agents  - Trend Cr    #S/P Syncope  - Likely related to hypovolemic state  - EKG reviewed  - Telemetry monitoring  - F/U AM ECHO  - F/U Cardio (Dr. Gipson)  - Trop x 1 neg    #Hyponatremia  - Hypovolemic hyponatremia  - Na 128 on admission, asymptomatic  - Likely due to dehydration in setting of elevated glucose (382)  - IVF with NS@75cc/hr  - Tighter glucose control  - Trend Na    #Upper Respiratory Infection  - RVP panel positive for entero/rhinovirus  - Supportive therapy  - Droplet precautions  - Cepacol prn    #Diarrhea  - Likely related to viral syndrome   - F/U GI PCR  - Trend electrolytes  - IVF    #Leukocytosis  - WBC mildly elevated to 11.06K  - Likely related to acute viral syndrome  - F/U CXR  - F/U UA  - F/U AM CBC    #Normocytic anemia  - Chronic, possibly related to hx of gastric bypass surgery (2007)  - On iron and B12 supplementation  - Last colonoscopy was last year, normal as per patient    #Diabetes mellitus  - Gluc 382 on admission  - Off of oral anti DM meds for several days due to poor PO intake  - Hold oral anti-DM meds while inpatient  - Lantus 20 U qHS  - ISS  - BGMs  - Carb consistent diet  - F/U AM HgbA1c. Titrate above regimen accordingly    #HTN  - Normotensive  - Hold losartan for now in light of ADINA  - Monitor BP    #COLLEEN  - On CPAP at bedtime    #COPD  - stable  - Supplemental O2  - Continue inhalers  - Duonebs prn    #Pulmonary HTN  - moderate pulm HTN noted on ECHO (Nov 2018)  - Likely related to underlying COPD, COLLEEN    #Bipolar disorder  - Stable   - Cont home meds    #DVT PPx  - Heparin SQ    #Advanced Directives  - Discussed with patient at bedside  - FULL CODE  - HCP : Daughter Ronit 60 yo F as described above presenting with severe fatigue preceding syncopal event admitted for ADINA secondary to severe dehydration.    - Admit to TELE    #ADINA  #Dehydration  - Hypovolemic on exam  - Prerenal azotemia   - Severe dehydration in setting of GI losses and poor PO intake  - Cr 2.28 on admission (baseline 0.8-1.1)  - IV hydration  - Encourage increased PO intake  - Zofran prn  - Avoid nephrotoxic agents  - Trend Cr    #S/P Syncope  - Likely related to hypovolemic state  - EKG reviewed  - Telemetry monitoring  - F/U AM ECHO  - F/U Cardio (Dr. Gipson)  - Trop x 1 neg    #Hyponatremia  - Hypovolemic hyponatremia  - Na 128 on admission, asymptomatic  - Likely due to dehydration in setting of elevated glucose (382)  - IVF with NS@75cc/hr  - Tighter glucose control  - Trend Na    #Upper Respiratory Infection  - RVP panel positive for entero/rhinovirus  - Supportive therapy  - Cepacol prn    #Diarrhea  - Likely related to viral syndrome   - F/U GI PCR  - Trend electrolytes  - IVF    #Leukocytosis  - WBC mildly elevated to 11.06K  - Likely related to acute viral syndrome  - F/U CXR  - F/U UA  - F/U AM CBC    #Normocytic anemia  - Chronic, possibly related to hx of gastric bypass surgery (2007)  - On iron and B12 supplementation  - Last colonoscopy was last year, normal as per patient    #Diabetes mellitus  - Gluc 382 on admission  - Off of oral anti DM meds for several days due to poor PO intake  - Hold oral anti-DM meds while inpatient  - Lantus 20 U qHS  - ISS  - BGMs  - Carb consistent diet  - F/U AM HgbA1c. Titrate above regimen accordingly    #HTN  - Normotensive  - Hold losartan for now in light of ADINA  - Monitor BP    #COLLEEN  - On CPAP at bedtime    #COPD  - stable  - Supplemental O2  - Continue inhalers  - Duonebs prn    #Pulmonary HTN  - moderate pulm HTN noted on ECHO (Nov 2018)  - Likely related to underlying COPD, COLLEEN    #Bipolar disorder  - Stable   - Cont home meds    #DVT PPx  - Heparin SQ    #Advanced Directives  - Discussed with patient at bedside  - FULL CODE  - HCP : Daughter Ronit

## 2020-01-06 NOTE — ED PROVIDER NOTE - ENMT, MLM
Airway patent, Nasal mucosa clear. Throat has no vesicles, no oropharyngeal exudates and uvula is midline. +Dry mucous membranes. +lips scabbed over

## 2020-01-07 PROBLEM — D64.9 ANEMIA, UNSPECIFIED: Chronic | Status: ACTIVE | Noted: 2019-10-24

## 2020-01-07 PROBLEM — J44.9 CHRONIC OBSTRUCTIVE PULMONARY DISEASE, UNSPECIFIED: Chronic | Status: ACTIVE | Noted: 2019-10-24

## 2020-01-07 LAB
ANION GAP SERPL CALC-SCNC: 8 MMOL/L — SIGNIFICANT CHANGE UP (ref 5–17)
APPEARANCE UR: CLEAR — SIGNIFICANT CHANGE UP
BASOPHILS # BLD AUTO: 0.09 K/UL — SIGNIFICANT CHANGE UP (ref 0–0.2)
BASOPHILS NFR BLD AUTO: 1 % — SIGNIFICANT CHANGE UP (ref 0–2)
BILIRUB UR-MCNC: NEGATIVE — SIGNIFICANT CHANGE UP
BUN SERPL-MCNC: 42 MG/DL — HIGH (ref 7–23)
CALCIUM SERPL-MCNC: 8.2 MG/DL — LOW (ref 8.5–10.1)
CHLORIDE SERPL-SCNC: 102 MMOL/L — SIGNIFICANT CHANGE UP (ref 96–108)
CO2 SERPL-SCNC: 23 MMOL/L — SIGNIFICANT CHANGE UP (ref 22–31)
COLOR SPEC: YELLOW — SIGNIFICANT CHANGE UP
CREAT SERPL-MCNC: 1.76 MG/DL — HIGH (ref 0.5–1.3)
CULTURE RESULTS: SIGNIFICANT CHANGE UP
DIFF PNL FLD: ABNORMAL
EOSINOPHIL # BLD AUTO: 0.09 K/UL — SIGNIFICANT CHANGE UP (ref 0–0.5)
EOSINOPHIL NFR BLD AUTO: 1 % — SIGNIFICANT CHANGE UP (ref 0–6)
GLUCOSE SERPL-MCNC: 363 MG/DL — HIGH (ref 70–99)
GLUCOSE UR QL: 1000 MG/DL
HBA1C BLD-MCNC: 9.8 % — HIGH (ref 4–5.6)
HCT VFR BLD CALC: 31.6 % — LOW (ref 34.5–45)
HGB BLD-MCNC: 10.3 G/DL — LOW (ref 11.5–15.5)
IMM GRANULOCYTES NFR BLD AUTO: 2 % — HIGH (ref 0–1.5)
KETONES UR-MCNC: NEGATIVE — SIGNIFICANT CHANGE UP
LEUKOCYTE ESTERASE UR-ACNC: NEGATIVE — SIGNIFICANT CHANGE UP
LYMPHOCYTES # BLD AUTO: 1.6 K/UL — SIGNIFICANT CHANGE UP (ref 1–3.3)
LYMPHOCYTES # BLD AUTO: 18.2 % — SIGNIFICANT CHANGE UP (ref 13–44)
MCHC RBC-ENTMCNC: 27.9 PG — SIGNIFICANT CHANGE UP (ref 27–34)
MCHC RBC-ENTMCNC: 32.6 GM/DL — SIGNIFICANT CHANGE UP (ref 32–36)
MCV RBC AUTO: 85.6 FL — SIGNIFICANT CHANGE UP (ref 80–100)
MONOCYTES # BLD AUTO: 0.56 K/UL — SIGNIFICANT CHANGE UP (ref 0–0.9)
MONOCYTES NFR BLD AUTO: 6.4 % — SIGNIFICANT CHANGE UP (ref 2–14)
NEUTROPHILS # BLD AUTO: 6.27 K/UL — SIGNIFICANT CHANGE UP (ref 1.8–7.4)
NEUTROPHILS NFR BLD AUTO: 71.4 % — SIGNIFICANT CHANGE UP (ref 43–77)
NITRITE UR-MCNC: NEGATIVE — SIGNIFICANT CHANGE UP
PH UR: 5 — SIGNIFICANT CHANGE UP (ref 5–8)
PLATELET # BLD AUTO: 225 K/UL — SIGNIFICANT CHANGE UP (ref 150–400)
POTASSIUM SERPL-MCNC: 3.6 MMOL/L — SIGNIFICANT CHANGE UP (ref 3.5–5.3)
POTASSIUM SERPL-SCNC: 3.6 MMOL/L — SIGNIFICANT CHANGE UP (ref 3.5–5.3)
PROT UR-MCNC: 15 MG/DL
RBC # BLD: 3.69 M/UL — LOW (ref 3.8–5.2)
RBC # FLD: 11.9 % — SIGNIFICANT CHANGE UP (ref 10.3–14.5)
SODIUM SERPL-SCNC: 133 MMOL/L — LOW (ref 135–145)
SP GR SPEC: 1.01 — SIGNIFICANT CHANGE UP (ref 1.01–1.02)
SPECIMEN SOURCE: SIGNIFICANT CHANGE UP
UROBILINOGEN FLD QL: NEGATIVE MG/DL — SIGNIFICANT CHANGE UP
WBC # BLD: 8.79 K/UL — SIGNIFICANT CHANGE UP (ref 3.8–10.5)
WBC # FLD AUTO: 8.79 K/UL — SIGNIFICANT CHANGE UP (ref 3.8–10.5)

## 2020-01-07 PROCEDURE — 74176 CT ABD & PELVIS W/O CONTRAST: CPT | Mod: 26

## 2020-01-07 PROCEDURE — 93306 TTE W/DOPPLER COMPLETE: CPT | Mod: 26

## 2020-01-07 RX ORDER — ACETAMINOPHEN 500 MG
650 TABLET ORAL EVERY 6 HOURS
Refills: 0 | Status: DISCONTINUED | OUTPATIENT
Start: 2020-01-07 | End: 2020-01-09

## 2020-01-07 RX ORDER — ONDANSETRON 8 MG/1
4 TABLET, FILM COATED ORAL EVERY 8 HOURS
Refills: 0 | Status: DISCONTINUED | OUTPATIENT
Start: 2020-01-07 | End: 2020-01-09

## 2020-01-07 RX ORDER — INSULIN GLARGINE 100 [IU]/ML
10 INJECTION, SOLUTION SUBCUTANEOUS ONCE
Refills: 0 | Status: COMPLETED | OUTPATIENT
Start: 2020-01-07 | End: 2020-01-07

## 2020-01-07 RX ORDER — ALPRAZOLAM 0.25 MG
1 TABLET ORAL AT BEDTIME
Refills: 0 | Status: DISCONTINUED | OUTPATIENT
Start: 2020-01-07 | End: 2020-01-09

## 2020-01-07 RX ORDER — AZITHROMYCIN 500 MG/1
500 TABLET, FILM COATED ORAL DAILY
Refills: 0 | Status: COMPLETED | OUTPATIENT
Start: 2020-01-07 | End: 2020-01-09

## 2020-01-07 RX ADMIN — Medication 2 MILLIGRAM(S): at 09:19

## 2020-01-07 RX ADMIN — RISPERIDONE 2 MILLIGRAM(S): 4 TABLET ORAL at 05:52

## 2020-01-07 RX ADMIN — RISPERIDONE 2 MILLIGRAM(S): 4 TABLET ORAL at 17:04

## 2020-01-07 RX ADMIN — Medication 150 MILLIGRAM(S): at 14:19

## 2020-01-07 RX ADMIN — HEPARIN SODIUM 5000 UNIT(S): 5000 INJECTION INTRAVENOUS; SUBCUTANEOUS at 14:19

## 2020-01-07 RX ADMIN — Medication 2 MILLIGRAM(S): at 01:33

## 2020-01-07 RX ADMIN — SODIUM CHLORIDE 75 MILLILITER(S): 9 INJECTION INTRAMUSCULAR; INTRAVENOUS; SUBCUTANEOUS at 03:02

## 2020-01-07 RX ADMIN — Medication 650 MILLIGRAM(S): at 18:52

## 2020-01-07 RX ADMIN — AZITHROMYCIN 500 MILLIGRAM(S): 500 TABLET, FILM COATED ORAL at 17:06

## 2020-01-07 RX ADMIN — Medication 50 MILLIGRAM(S): at 17:04

## 2020-01-07 RX ADMIN — PANTOPRAZOLE SODIUM 40 MILLIGRAM(S): 20 TABLET, DELAYED RELEASE ORAL at 05:52

## 2020-01-07 RX ADMIN — Medication 50 MILLIGRAM(S): at 05:52

## 2020-01-07 RX ADMIN — PREGABALIN 500 MICROGRAM(S): 225 CAPSULE ORAL at 14:19

## 2020-01-07 RX ADMIN — Medication 4: at 12:44

## 2020-01-07 RX ADMIN — HEPARIN SODIUM 5000 UNIT(S): 5000 INJECTION INTRAVENOUS; SUBCUTANEOUS at 22:54

## 2020-01-07 RX ADMIN — Medication 75 MILLIGRAM(S): at 22:56

## 2020-01-07 RX ADMIN — Medication 4: at 17:04

## 2020-01-07 RX ADMIN — HEPARIN SODIUM 5000 UNIT(S): 5000 INJECTION INTRAVENOUS; SUBCUTANEOUS at 05:52

## 2020-01-07 RX ADMIN — Medication 4: at 08:40

## 2020-01-07 RX ADMIN — INSULIN GLARGINE 10 UNIT(S): 100 INJECTION, SOLUTION SUBCUTANEOUS at 14:21

## 2020-01-07 NOTE — DIETITIAN INITIAL EVALUATION ADULT. - OTHER INFO
58yo female with PMH significant for COPD, pulm HTN, DM, COLLEEN on CPAP, HTN p/w 2 day h/o severe fatigue and weakness with syncopal episode.  Pt reported ~8 diarrheal BM's daily with vomiting intermittently since December 27.  Pt admitted with diarrhea pending C.Diff and ARF. In v/o labs, rec'd liberalize diet to consistent carb with halal if patient follows (due to lethargy from meds, was unable to confirm during visit).

## 2020-01-07 NOTE — CONSULT NOTE ADULT - ASSESSMENT
Syncope  ADINA  Viral gastroenteritis  Viral syndrome  Anemia  COPD    Suggest: Syncope sec to hypovolemia / dehydration  ADINA  Viral gastroenteritis  Viral syndrome  Anemia  COPD    Suggest:    Hydration  Follow up BP  Follow up renal function and lytes.  Monitor H/H   Cardiac status is stable.   I shall f/u PRN now.

## 2020-01-07 NOTE — PROGRESS NOTE ADULT - SUBJECTIVE AND OBJECTIVE BOX
Pt has been seen and examined with FP resident, resident supervised agree with a/p       Patient is a 59y old  Female who presents with a chief complaint of ADINA/Dehydration/Syncope (07 Jan 2020 07:11)    PHYSICAL EXAM:  Vital Signs Last 24 Hrs  T(C): 36.4 (07 Jan 2020 09:16), Max: 36.8 (07 Jan 2020 03:35)  T(F): 97.6 (07 Jan 2020 09:16), Max: 98.2 (07 Jan 2020 03:35)  HR: 99 (07 Jan 2020 13:00) (80 - 107)  BP: 153/90 (07 Jan 2020 12:00) (90/65 - 153/90)  BP(mean): 109 (07 Jan 2020 12:00) (74 - 109)  RR: 21 (07 Jan 2020 13:00) (17 - 25)  SpO2: 98% (07 Jan 2020 13:00) (98% - 100%)  general- comfortable, appears tired   -rs-aeeb,cta  -cvs-s1s2 normal   -p/a-soft,bs+  -extremity- no asymmetrical swelling noted   -cns- non focal     #Diarrhea -etiology ?- get stool PCR, C diff, get abdominal CT   -iv fluids     #ARF- iv fluids and monitoring     #DM- start lantus in am as well to better control her blood sugar             A/P

## 2020-01-07 NOTE — DIETITIAN INITIAL EVALUATION ADULT. - PERTINENT MEDS FT
MEDICATIONS  (STANDING):  ALPRAZolam 1 milliGRAM(s) Oral at bedtime  ARIPiprazole 30 milliGRAM(s) Oral at bedtime  budesonide 160 MICROgram(s)/formoterol 4.5 MICROgram(s) Inhaler 2 Puff(s) Inhalation two times a day  cyanocobalamin 500 MICROGram(s) Oral daily  dextrose 5%. 1000 milliLiter(s) (50 mL/Hr) IV Continuous <Continuous>  dextrose 50% Injectable 12.5 Gram(s) IV Push once  dextrose 50% Injectable 25 Gram(s) IV Push once  dextrose 50% Injectable 25 Gram(s) IV Push once  ferrous    sulfate 325 milliGRAM(s) Oral at bedtime  heparin  Injectable 5000 Unit(s) SubCutaneous three times a day  hydrOXYzine hydrochloride 50 milliGRAM(s) Oral two times a day  insulin glargine Injectable (LANTUS) 10 Unit(s) SubCutaneous once  insulin glargine Injectable (LANTUS) 20 Unit(s) SubCutaneous at bedtime  insulin lispro (HumaLOG) corrective regimen sliding scale   SubCutaneous three times a day before meals  insulin lispro (HumaLOG) corrective regimen sliding scale   SubCutaneous at bedtime  pantoprazole    Tablet 40 milliGRAM(s) Oral before breakfast  risperiDONE   Tablet 2 milliGRAM(s) Oral two times a day  sodium chloride 0.9%. 1000 milliLiter(s) (75 mL/Hr) IV Continuous <Continuous>  venlafaxine XR. 75 milliGRAM(s) Oral at bedtime  venlafaxine XR. 150 milliGRAM(s) Oral daily    MEDICATIONS  (PRN):  acetaminophen   Tablet .. 650 milliGRAM(s) Oral every 6 hours PRN Temp greater or equal to 38C (100.4F), Mild Pain (1 - 3)  ALBUTerol    90 MICROgram(s) HFA Inhaler 2 Puff(s) Inhalation every 6 hours PRN for shortness of breath and/or wheezing  benzocaine 15 mG/menthol 3.6 mG (Sugar-Free) Lozenge 1 Lozenge Oral four times a day PRN Sore Throat  dextrose 40% Gel 15 Gram(s) Oral once PRN Blood Glucose LESS THAN 70 milliGRAM(s)/deciliter  glucagon  Injectable 1 milliGRAM(s) IntraMuscular once PRN Glucose LESS THAN 70 milligrams/deciliter  ondansetron   Disintegrating Tablet 4 milliGRAM(s) Oral every 8 hours PRN Nausea and/or Vomiting

## 2020-01-07 NOTE — DIETITIAN INITIAL EVALUATION ADULT. - NAME AND PHONE
Devi Szymanski MA, RDN, CDN, [facility]  [phone] (office number)  [phone] (pager number)

## 2020-01-07 NOTE — DIETITIAN INITIAL EVALUATION ADULT. - FACTORS AFF FOOD INTAKE
pt reports poor PO intake x 10 days 2/2 diarrhea and nausea/vomiting.  pt was limited with information 2/2 medication making her sleepy./other (specify)

## 2020-01-07 NOTE — DIETITIAN INITIAL EVALUATION ADULT. - PERTINENT LABORATORY DATA
01-07 Na133 mmol/L<L> Glu 363 mg/dL<H> K+ 3.6 mmol/L Cr  1.76 mg/dL<H> BUN 42 mg/dL<H> Phos n/a   Alb n/a   PAB n/a

## 2020-01-07 NOTE — PROGRESS NOTE ADULT - SUBJECTIVE AND OBJECTIVE BOX
HPI:  60 yo obese F with PMHx significant for COPD (dependent on 4L O2 via NC at home), pulmonary HTN, DM, COLLEEN on CPAP, and HTN presents with 2 day history of severe fatigue and weakness preceding a syncopal episode on morning of admission. Pt works as a . Pt was using the computer when she passed out with a  period of LOC for an unknown period. She recalls being awoken by her  and she was advised to leave work and be evaluated. She returned to work for the first time in over 2 weeks and describes feeling very tired. She was off from work initially for the holidays, but then became ill. She was unable to return to work this past 20 because she felt too weak and fatigued. Pt began to experience nausea and vomiting with associated chills on 19. She vomited intermittently, worsened if she had food. She had approx 8 diarrheal BMs daily. Symptoms subsided after about 4 days.  Vomiting has since resolved. Diarrhea still persists, but significantly reduced. She regained her appetite on day of admission. Pt denies any sick contacts or recent travel. She has had some myalgias but denies any abdominal pain, cough or chest pain. Pt has experienced some palpitations.    In the ED, pt was hypotensive to 90/65. She was bolused 1L NS. RVP panel tested positive for entero/rhinovirus. (2020 23:11)     SUBJECTIVE:   Pt is evaluated at bedside and found somnolent but in no acute distress. Pt use xanax 2mg 4 times at day at home making the suspicion of  increased amount of xanax this morning. Xanax 2 mg PO will be discontinued and xanax 1 mg at bedtime will be ordered.  GI PCR remarkable for infection with campylobacter species. Pt will be started in antibiotic treatment with Azithromycin 500 mg for 3 doses. H1Ac 9.8 and elevating fasting  and received lantus 10 U this morning. Will continue with Lantus 20 U at bedtime. She reportescontinue with abdominal pain and diarrhea. No other symptoms reported.     Review of Symptoms  Gen: no fevers, chills, sweats, weight loss, fatigue  Visual: no recent changes in vision, no blurriness, no seeing spots  Cardiovascular: no chest pain, no palpitations, no orthopnea, no leg swelling  Respiratory: no shortness of breath, no exertional dyspnea, no cough, no rhinorrhea, no nasal congestion  GI: + abdominal pain, diarrhea. No difficulty swallowing, no nausea, no vomiting, no constipation, no melena  : no dysuria, no increased freq, no hematuria, no malodorous urine  Derm: no wounds, no rashes  Heme: no easy bleeding or bruising  MSK: no joint pain, no joint swelling or redness, no extremity pain   Neuro: no headache, no numbness, no weakness, no memory loss  Psych: no depression, no anxiety, no SI    MEDICATIONS  (STANDING):  ALPRAZolam 1 milliGRAM(s) Oral at bedtime  ARIPiprazole 30 milliGRAM(s) Oral at bedtime  azithromycin   Tablet 500 milliGRAM(s) Oral daily  budesonide 160 MICROgram(s)/formoterol 4.5 MICROgram(s) Inhaler 2 Puff(s) Inhalation two times a day  cyanocobalamin 500 MICROGram(s) Oral daily  dextrose 5%. 1000 milliLiter(s) (50 mL/Hr) IV Continuous <Continuous>  dextrose 50% Injectable 12.5 Gram(s) IV Push once  dextrose 50% Injectable 25 Gram(s) IV Push once  dextrose 50% Injectable 25 Gram(s) IV Push once  ferrous    sulfate 325 milliGRAM(s) Oral at bedtime  heparin  Injectable 5000 Unit(s) SubCutaneous three times a day  hydrOXYzine hydrochloride 50 milliGRAM(s) Oral two times a day  insulin glargine Injectable (LANTUS) 20 Unit(s) SubCutaneous at bedtime  insulin lispro (HumaLOG) corrective regimen sliding scale   SubCutaneous three times a day before meals  insulin lispro (HumaLOG) corrective regimen sliding scale   SubCutaneous at bedtime  pantoprazole    Tablet 40 milliGRAM(s) Oral before breakfast  risperiDONE   Tablet 2 milliGRAM(s) Oral two times a day  sodium chloride 0.9%. 1000 milliLiter(s) (75 mL/Hr) IV Continuous <Continuous>  venlafaxine XR. 75 milliGRAM(s) Oral at bedtime  venlafaxine XR. 150 milliGRAM(s) Oral daily    MEDICATIONS  (PRN):  acetaminophen   Tablet .. 650 milliGRAM(s) Oral every 6 hours PRN Temp greater or equal to 38C (100.4F), Mild Pain (1 - 3)  ALBUTerol    90 MICROgram(s) HFA Inhaler 2 Puff(s) Inhalation every 6 hours PRN for shortness of breath and/or wheezing  benzocaine 15 mG/menthol 3.6 mG (Sugar-Free) Lozenge 1 Lozenge Oral four times a day PRN Sore Throat  dextrose 40% Gel 15 Gram(s) Oral once PRN Blood Glucose LESS THAN 70 milliGRAM(s)/deciliter  glucagon  Injectable 1 milliGRAM(s) IntraMuscular once PRN Glucose LESS THAN 70 milligrams/deciliter  ondansetron   Disintegrating Tablet 4 milliGRAM(s) Oral every 8 hours PRN Nausea and/or Vomiting      Vital Signs Last 24 Hrs  T(C): 36.3 (2020 16:15), Max: 36.8 (2020 03:35)  T(F): 97.3 (2020 16:15), Max: 98.2 (2020 03:35)  HR: 91 (2020 17:00) (80 - 107)  BP: 154/98 (2020 16:12) (110/58 - 154/98)  BP(mean): 116 (2020 16:12) (84 - 116)  RR: 18 (2020 17:00) (17 - 25)  SpO2: 100% (2020 17:00) (98% - 100%)    GEN: NAD, comfortable, resting in bed  HEENT: NC/AT, EOMI, PERRLA, MMM, clear conjunctiva and sclera, normal hearing, no nasal discharge, dry mucosa,  throat clear, no thrush, normal dentition.   NECK: supple, no JVD, no LAD, no thyromegaly  BACK:  ROM intact, no spinal/paraspinal tenderness  CV: S1S2, RRR, no mumur  RESP: good air movement, CTABL, no rales, rhonchi or wheezing, respirations unlabored  ABD: +BS, distended but soft, NT, no guarding, no rigidity, no HSM  EXT: +2 radial and pedal pulses, no edema, no calve tenderness  SKIN: No visible Rashes or Ulcers  MSK: ROM intact in all extremities  NEURO:  sensory and CN 2-12 Grossly intact, no motor deficits, no, saddle anesthesia, AOx3  PSYCH: normal behavior         LABS:                          10.3   8.79  )-----------( 225      ( 2020 07:26 )             31.6     2020 07:26    133    |  102    |  42     ----------------------------<  363    3.6     |  23     |  1.76     Ca    8.2        2020 07:26    TPro  7.1    /  Alb  2.9    /  TBili  0.4    /  DBili  x      /  AST  7      /  ALT  12     /  AlkPhos  83     2020 18:30    LIVER FUNCTIONS - ( 2020 18:30 )  Alb: 2.9 g/dL / Pro: 7.1 gm/dL / ALK PHOS: 83 U/L / ALT: 12 U/L / AST: 7 U/L / GGT: x           PT/INR - ( 2020 18:30 )   PT: 13.4 sec;   INR: 1.20 ratio         PTT - ( 2020 18:30 )  PTT:26.7 sec  CAPILLARY BLOOD GLUCOSE      POCT Blood Glucose.: 303 mg/dL (2020 16:59)  POCT Blood Glucose.: 320 mg/dL (2020 12:39)  POCT Blood Glucose.: 349 mg/dL (2020 08:29)  POCT Blood Glucose.: 376 mg/dL (2020 03:00)  POCT Blood Glucose.: 334 mg/dL (2020 18:34)    CARDIAC MARKERS ( 2020 18:30 )  <0.015 ng/mL / x     / x     / x     / x          Urinalysis Basic - ( 2020 01:46 )    Color: Yellow / Appearance: Clear / S.010 / pH: x  Gluc: x / Ketone: Negative  / Bili: Negative / Urobili: Negative mg/dL   Blood: x / Protein: 15 mg/dL / Nitrite: Negative   Leuk Esterase: Negative / RBC: 0-2 /HPF / WBC 0-2   Sq Epi: x / Non Sq Epi: Few / Bacteria: Few    Culture Results:   Campylobacter species  DETECTED by PCR  *******Please Note:*******  GI panel PCR evaluates for:  Campylobacter, Plesiomonas shigelloides, Salmonella,  Vibrio, Yersinia enterocolitica, Enteroaggregative  Escherichia coli (EAEC), Enteropathogenic E.coli (EPEC),  Enterotoxigenic E. coli (ETEC) lt/st, Shiga-like  toxin-producing E. coli (STEC) stx1/stx2,  Shigella/ Enteroinvasive E. coli (EIEC), Cryptosporidium,  Cyclospora cayetanensis, Entamoeba histolytica,  Giardia lamblia, Adenovirus F 40/41, Astrovirus,  Norovirus GI/GII, Rotavirus A, Sapovirus (20 @ 05:45)        RADIOLOGY:     CT Abdomen and Pelvis No Cont (20 @ 14:10) >  IMPRESSION:     Postoperative changes status post gastric bypass. No evidence of bowel obstruction.    Fecalization of distal small bowel contents, without evidence of abnormally dilated bowel loops, suggesting a component of slow transit.    Cholelithiasis with prominent distention of the gallbladder. Correlate with clinical and laboratory findings.       Transthoracic Echocardiogram (18 @ 09:58) >   Impression     Summary     Trace pericardial effusion is present.   Fibrocalcific changes noted to the mitral valve leaflets with preserved   leaflet excursion.   Trace mitral regurgitation is present.   EA reversal of the mitral inflow consistent with reduced compliance of   the   left ventricle.   Normal aortic valve structure and function.   Moderate (2+) tricuspid valve regurgitation is present.   Moderate pulmonary hypertension.   Normal appearing pulmonic valve structure and function.   The left atrium is mildly dilated.   Mild concentric left ventricular hypertrophy is present.   Estimated left ventricular ejection fraction is 55 %.   The interventricular septum appears flattened consistent with an RV   pressure/volume overload   Normal appearing right atrium.   Moderate, concentric, right ventricular hypertrophy is present.   The right ventricle exhibits mild mild depression of contractility.   All visualized extra cardiac structures appears to be normal.      < end of copied text >

## 2020-01-07 NOTE — DIETITIAN INITIAL EVALUATION ADULT. - ADD RECOMMEND
1) change diet Rx to consistent carb halal 2) monitor PO intake/tolerance 3) weekly wt checks to track/trend changes 4) add MVI with minerals daily to ensure 100% RDI met

## 2020-01-07 NOTE — PROVIDER CONTACT NOTE (OTHER) - SITUATION
spoke with Paulette in office to inform dr that patient Is in [facility].  Please fax discharge papers to [phone]

## 2020-01-07 NOTE — DIETITIAN INITIAL EVALUATION ADULT. - PHYSICAL APPEARANCE
other (specify)/obese NFPE revealed no significant muscle/fat wasting.  no edema.  howard score of 20, no PU noted.

## 2020-01-07 NOTE — PROGRESS NOTE ADULT - ASSESSMENT
60 yo F as described above presenting with severe fatigue preceding syncopal event admitted for ADINA secondary to severe dehydration.      #ADINA  #Dehydration  - Hypovolemic on exam  - Prerenal azotemia   - Severe dehydration in setting of GI losses and poor PO intake  - Cr 2.28 on admission (baseline 0.8-1.1)  - Today Cr 1.76  - Continue IV hydration with NS 75ml/hr   - Encourage increased PO intake  - Zofran prn  - Avoid nephrotoxic agents  - Continue trending Cr    # Gastroenteritis  - GI PCR detected campylobacter species  - Start Azithromycin 500 mg PO for 3 doses   - Continue IV hydration   - CT abdomen remarkable for  fecalization of distal small bowel content w/o evidence of dilated bowel loops. Cholelithiasis with prominent distention of the gallbladder.     #S/P Syncope  - Likely related to hypovolemic state  - EKG w/o changes   - Telemetry monitoring  -  ECHO remarkable for  LVEF 55%, mild concentric left ventricular hypertrophy. Moderate TVR  - Cardiology consult appreciated   - Trop x 1 neg    #Hyponatremia  - Hypovolemic hyponatremia  - Na 128 on admission, asymptomatic  - Improved to 133, continue monitoring   - Likely due to dehydration in setting of elevated glucose (382)  - Continue IVF with NS@75cc/hr  - Tighter glucose control  - Trend Na    #Upper Respiratory Infection  - RVP panel positive for entero/rhinovirus  - Supportive therapy  - Cepacol prn    #Diarrhea   -GI PCR detected campylobacter species   - Trend electrolytes  - Continue IVF    #Leukocytosis  - WBC mildly elevated to   - Likely related to acute viral syndrome  -  CXR  - F/U UA  - F/U AM CBC    #Normocytic anemia  - Chronic, possibly related to hx of gastric bypass surgery (2007)  - On iron and B12 supplementation  - Last colonoscopy was last year, normal as per patient    #Diabetes mellitus  - Gluc 382 on admission  - Off of oral anti DM meds for several days due to poor PO intake  - Hold oral anti-DM meds while inpatient  - Lantus 20 U qHS  - ISS  - BGMs  - Carb consistent diet  - F/U AM HgbA1c. Titrate above regimen accordingly    #HTN  - Normotensive  - Hold losartan for now in light of ADINA  - Monitor BP    #COLLEEN  - On CPAP at bedtime    #COPD  - stable  - Supplemental O2  - Continue inhalers  - Duonebs prn    #Pulmonary HTN  - moderate pulm HTN noted on ECHO (Nov 2018)  - Likely related to underlying COPD, COLLEEN    #Bipolar disorder  - Stable   - Cont home meds    #DVT PPx  - Heparin SQ    #Advanced Directives  - Discussed with patient at bedside  - FULL CODE  - HCP : Daughter Ronit 60 yo F as described above presenting with severe fatigue preceding syncopal event admitted for ADINA secondary to severe dehydration.      #ADINA  #Dehydration  - Hypovolemic on exam  - Prerenal azotemia   - Severe dehydration in setting of GI losses and poor PO intake  - Cr 2.28 on admission (baseline 0.8-1.1)  - Today Cr 1.76  - Continue IV hydration with NS 75ml/hr   - Encourage increased PO intake  - Zofran prn  - Avoid nephrotoxic agents  - Continue trending Cr    # Gastroenteritis  - GI PCR detected campylobacter species  - Start Azithromycin 500 mg PO for 3 doses   - Continue IV hydration   - CT abdomen remarkable for  fecalization of distal small bowel content w/o evidence of dilated bowel loops. Cholelithiasis with prominent distention of the gallbladder.     #S/P Syncope  - Likely related to hypovolemic state  - EKG w/o changes   - Telemetry monitoring  -  ECHO remarkable for  LVEF 55%, mild concentric left ventricular hypertrophy. Moderate TVR  - Cardiology consult appreciated   - Trop x 1 neg    #Hyponatremia  - Hypovolemic hyponatremia  - Na 128 on admission, asymptomatic  - Improved to 133, continue monitoring   - Likely due to dehydration in setting of elevated glucose (382)  - Continue IVF with NS@75cc/hr  - Tighter glucose control  - Trend Na    #Upper Respiratory Infection  - RVP panel positive for entero/rhinovirus  - Supportive therapy  - Cepacol prn    #Diarrhea   -GI PCR detected campylobacter species   - Trend electrolytes  - Continue IVF    #Leukocytosis  - WBC in decreased trend from 11.06 to 8.79   - Likely related to acute viral syndrome  -  CXR- without abnomalities   - F/U UA  - F/U AM CBC    #Normocytic anemia  - Chronic, possibly related to hx of gastric bypass surgery (2007)  - On iron and B12 supplementation  - Last colonoscopy was last year, normal as per patient    #Diabetes mellitus  - Gluc 382 on admission  - Off of oral anti DM meds for several days due to poor PO intake  - Hold oral anti-DM meds while inpatient  - Lantus 10 U given at morning   - Lantus 20 U qHS at bedside   - ISS  - BGMs  - Carb consistent diet  -  HgbA1c 9.8     #HTN  - Normotensive  - Hold losartan for now in light of ADINA  - Monitor BP    #COLLEEN  - On CPAP at bedtime    #COPD  - stable  - Supplemental O2  - Continue inhalers  - Duonebs prn    #Pulmonary HTN  - moderate pulm HTN noted on ECHO (Nov 2018)  - Likely related to underlying COPD, COLLEEN    #Bipolar disorder  - Stable   - Cont home meds    #DVT PPx  - Continue Heparin SQ    #Advanced Directives  - Discussed with patient at bedside  - FULL CODE  - HCP : Celi Cottrell    Case discussed with Dr Rojas

## 2020-01-07 NOTE — CONSULT NOTE ADULT - SUBJECTIVE AND OBJECTIVE BOX
Patient is a 59y old  Female who presents with a chief complaint of ADINA/Dehydration/Syncope (2020 23:11)      HPI:  60 yo obese F with PMHx significant for COPD (dependent on 4L O2 via NC at home), pulmonary HTN, DM, COLLEEN on CPAP, and HTN presents with 2 day history of severe fatigue and weakness preceding a syncopal episode on morning of admission. Pt works as a . Pt was using the computer when she passed out with a  period of LOC for an unknown period. She recalls being awoken by her  and she was advised to leave work and be evaluated. She returned to work for the first time in over 2 weeks and describes feeling very tired. She was off from work initially for the holidays, but then became ill. She was unable to return to work this past 20 because she felt too weak and fatigued. Pt began to experience nausea and vomiting with associated chills on 19. She vomited intermittently, worsened if she had food. She had approx 8 diarrheal BMs daily. Symptoms subsided after about 4 days.  Vomiting has since resolved. Diarrhea still persists, but significantly reduced. She regained her appetite on day of admission. Pt denies any sick contacts or recent travel. She has had some myalgias but denies any abdominal pain, cough or chest pain. Pt has experienced some palpitations.    In the ED, pt was hypotensive to 90/65. She was bolused 1L NS. RVP panel tested positive for entero/rhinovirus. (2020 23:11)      PAST MEDICAL & SURGICAL HISTORY:  Anemia  COPD (chronic obstructive pulmonary disease)  ADHD (attention deficit hyperactivity disorder)  COLLEEN (obstructive sleep apnea)  Hypertension  Diabetes mellitus: Type 2  Bipolar 1 disorder  Gastric bypass status for obesity   delivery delivered: x2      PREVIOUS CARDIAC WORKUP:      Echo:  18 Nml EF, trace MR, Moderate TR, moderate PAH, RVH  Cardiac Cath:  Normal cors. Moderate PAH, mildly elevated LVEDP, PCWP    ALLERGIES:    celery allergy (Faint)  No Known Drug Allergies       MEDICATIONS  (STANDING):  ALPRAZolam 2 milliGRAM(s) Oral four times a day  ARIPiprazole 30 milliGRAM(s) Oral at bedtime  budesonide 160 MICROgram(s)/formoterol 4.5 MICROgram(s) Inhaler 2 Puff(s) Inhalation two times a day  cyanocobalamin 500 MICROGram(s) Oral daily  dextrose 5%. 1000 milliLiter(s) (50 mL/Hr) IV Continuous <Continuous>  dextrose 50% Injectable 12.5 Gram(s) IV Push once  dextrose 50% Injectable 25 Gram(s) IV Push once  dextrose 50% Injectable 25 Gram(s) IV Push once  ferrous    sulfate 325 milliGRAM(s) Oral at bedtime  heparin  Injectable 5000 Unit(s) SubCutaneous three times a day  hydrOXYzine hydrochloride 50 milliGRAM(s) Oral two times a day  insulin glargine Injectable (LANTUS) 20 Unit(s) SubCutaneous at bedtime  insulin lispro (HumaLOG) corrective regimen sliding scale   SubCutaneous three times a day before meals  insulin lispro (HumaLOG) corrective regimen sliding scale   SubCutaneous at bedtime  pantoprazole    Tablet 40 milliGRAM(s) Oral before breakfast  risperiDONE   Tablet 2 milliGRAM(s) Oral two times a day  sodium chloride 0.9%. 1000 milliLiter(s) (75 mL/Hr) IV Continuous <Continuous>  venlafaxine XR. 75 milliGRAM(s) Oral at bedtime  venlafaxine XR. 150 milliGRAM(s) Oral daily    MEDICATIONS  (PRN):  acetaminophen   Tablet .. 650 milliGRAM(s) Oral every 6 hours PRN Temp greater or equal to 38C (100.4F), Mild Pain (1 - 3)  ALBUTerol    90 MICROgram(s) HFA Inhaler 2 Puff(s) Inhalation every 6 hours PRN for shortness of breath and/or wheezing  benzocaine 15 mG/menthol 3.6 mG (Sugar-Free) Lozenge 1 Lozenge Oral four times a day PRN Sore Throat  dextrose 40% Gel 15 Gram(s) Oral once PRN Blood Glucose LESS THAN 70 milliGRAM(s)/deciliter  glucagon  Injectable 1 milliGRAM(s) IntraMuscular once PRN Glucose LESS THAN 70 milligrams/deciliter  ondansetron    Tablet 4 milliGRAM(s) Oral every 8 hours PRN Nausea and/or Vomiting      FAMILY HISTORY:  Family history of heart disease: father  Family history of leukemia: mother and two cousins, all now         SOCIAL HISTORY:  Nonsmoker. No ETOH abuse. No illicit drugs.     ROS:     Detailed ten system ROS was performed and was negative except for history as eluded to above.    no fever  no chills  no nausea  no vomiting  + diarrhea  no constipation  no melena  no hematochezia  no chest pain  no palpitations  no sob at rest  no dyspnea on exertion  no cough  no wheezing  no anorexia  no headache  no dizziness  + syncope  + weakness  no myalgia  no dysuria  no polyuria  no hematuria     Vital Signs Last 24 Hrs  T(C): 36.8 (2020 03:35), Max: 36.8 (2020 03:35)  T(F): 98.2 (2020 03:35), Max: 98.2 (2020 03:35)  HR: 90 (2020 04:00) (80 - 93)  BP: 116/77 (2020 04:00) (90/65 - 124/72)  BP(mean): 89 (2020 04:00) (74 - 89)  RR: 18 (2020 04:00) (17 - 18)  SpO2: 100% (2020 04:00) (98% - 100%)    I&O's Summary    2020 07:01  -  2020 07:00  --------------------------------------------------------  IN: 769 mL / OUT: 200 mL / NET: 569 mL        PHYSICAL EXAM:    General:                Comfortable, AAO X 3, in no distress.  HEENT:                  Atraumatic, PERRLA, EOMI, conjunctiva clear.   Neck:                     Supple, no adenopathy, no thyromegaly, no JVD, no bruit.  Back:                     Symmetric, non tender.  Chest:                    Clear, B/L symmetric air entry, no tachypnea  Heart:                     S1, S2 normal, no gallop, no murmur.  Abdomen:              Soft, non-tender, bowel sounds active. No palpable masses.  Extremities:           no cyanosis, no edema. Peripheral pulses normal.  Skin:                      Skin color, texture normal. No rashes.  Neurologic:            Grossly nonfocal.      TELEMETRY:    ECG:    LABS:                          10.9   11.06 )-----------( 262      ( 2020 18:30 )             33.4     01-06    128<L>  |  96  |  44<H>  ----------------------------<  382<H>  3.9   |  25  |  2.28<H>    Ca    8.3<L>      2020 18:30    TPro  7.1  /  Alb  2.9<L>  /  TBili  0.4  /  DBili  x   /  AST  7<L>  /  ALT  12  /  AlkPhos  83   @ 18:30  Trop-I  <0.015      PT/INR - ( 2020 18:30 )   PT: 13.4 sec;   INR: 1.20 ratio         PTT - ( 2020 18:30 )  PTT:26.7 sec  Urinalysis Basic - ( 2020 01:46 )    Color: Yellow / Appearance: Clear / S.010 / pH: x  Gluc: x / Ketone: Negative  / Bili: Negative / Urobili: Negative mg/dL   Blood: x / Protein: 15 mg/dL / Nitrite: Negative   Leuk Esterase: Negative / RBC: 0-2 /HPF / WBC 0-2   Sq Epi: x / Non Sq Epi: Few / Bacteria: Few            RADIOLOGY & ADDITIONAL STUDIES: Patient is a 59y old  Female who presents with a chief complaint of ADINA/Dehydration/Syncope (2020 23:11)      HPI:  59-year-old female admitted with complaints of syncopal episode while at work. Patient has had Few episodes of syncope in the last few days. She has not been feeling well. Has had multiple bouts of diarrhea on a daily basis. She has been complaining of fatigue. Over the last few days has been having complains of nausea and vomiting intermittently. For the last 5-6 days her oral intake has been very poor including fluids. Noted to be hypotensive in the ER and with new renal failure.      PAST MEDICAL & SURGICAL HISTORY:  Anemia  COPD (chronic obstructive pulmonary disease)  ADHD (attention deficit hyperactivity disorder)  COLLEEN (obstructive sleep apnea)  Hypertension  Diabetes mellitus: Type 2  Bipolar 1 disorder  Gastric bypass status for obesity   delivery delivered: x2      PREVIOUS CARDIAC WORKUP:      Echo:  18 Nml EF, trace MR, Moderate TR, moderate PAH, RVH  Cardiac Cath:  Normal cors. Moderate PAH, mildly elevated LVEDP, PCWP    ALLERGIES:    celery allergy (Faint)  No Known Drug Allergies       MEDICATIONS  (STANDING):  ALPRAZolam 2 milliGRAM(s) Oral four times a day  ARIPiprazole 30 milliGRAM(s) Oral at bedtime  budesonide 160 MICROgram(s)/formoterol 4.5 MICROgram(s) Inhaler 2 Puff(s) Inhalation two times a day  cyanocobalamin 500 MICROGram(s) Oral daily  dextrose 5%. 1000 milliLiter(s) (50 mL/Hr) IV Continuous <Continuous>  dextrose 50% Injectable 12.5 Gram(s) IV Push once  dextrose 50% Injectable 25 Gram(s) IV Push once  dextrose 50% Injectable 25 Gram(s) IV Push once  ferrous    sulfate 325 milliGRAM(s) Oral at bedtime  heparin  Injectable 5000 Unit(s) SubCutaneous three times a day  hydrOXYzine hydrochloride 50 milliGRAM(s) Oral two times a day  insulin glargine Injectable (LANTUS) 20 Unit(s) SubCutaneous at bedtime  insulin lispro (HumaLOG) corrective regimen sliding scale   SubCutaneous three times a day before meals  insulin lispro (HumaLOG) corrective regimen sliding scale   SubCutaneous at bedtime  pantoprazole    Tablet 40 milliGRAM(s) Oral before breakfast  risperiDONE   Tablet 2 milliGRAM(s) Oral two times a day  sodium chloride 0.9%. 1000 milliLiter(s) (75 mL/Hr) IV Continuous <Continuous>  venlafaxine XR. 75 milliGRAM(s) Oral at bedtime  venlafaxine XR. 150 milliGRAM(s) Oral daily    MEDICATIONS  (PRN):  acetaminophen   Tablet .. 650 milliGRAM(s) Oral every 6 hours PRN Temp greater or equal to 38C (100.4F), Mild Pain (1 - 3)  ALBUTerol    90 MICROgram(s) HFA Inhaler 2 Puff(s) Inhalation every 6 hours PRN for shortness of breath and/or wheezing  benzocaine 15 mG/menthol 3.6 mG (Sugar-Free) Lozenge 1 Lozenge Oral four times a day PRN Sore Throat  dextrose 40% Gel 15 Gram(s) Oral once PRN Blood Glucose LESS THAN 70 milliGRAM(s)/deciliter  glucagon  Injectable 1 milliGRAM(s) IntraMuscular once PRN Glucose LESS THAN 70 milligrams/deciliter  ondansetron    Tablet 4 milliGRAM(s) Oral every 8 hours PRN Nausea and/or Vomiting      FAMILY HISTORY:  Family history of heart disease: father  Family history of leukemia: mother and two cousins, all now         SOCIAL HISTORY:  Nonsmoker. No ETOH abuse. No illicit drugs.     ROS:     Detailed ten system ROS was performed and was negative except for history as eluded to above.    no fever  no chills  no nausea  no vomiting  + diarrhea  no constipation  no melena  no hematochezia  no chest pain  no palpitations  no sob at rest  no dyspnea on exertion  no cough  no wheezing  no anorexia  no headache  no dizziness  + syncope  + weakness  no myalgia  no dysuria  no polyuria  no hematuria     Vital Signs Last 24 Hrs  T(C): 36.8 (2020 03:35), Max: 36.8 (2020 03:35)  T(F): 98.2 (2020 03:35), Max: 98.2 (2020 03:35)  HR: 90 (2020 04:00) (80 - 93)  BP: 116/77 (2020 04:00) (90/65 - 124/72)  BP(mean): 89 (2020 04:00) (74 - 89)  RR: 18 (2020 04:00) (17 - 18)  SpO2: 100% (2020 04:00) (98% - 100%)    I&O's Summary    2020 07:01  -  2020 07:00  --------------------------------------------------------  IN: 769 mL / OUT: 200 mL / NET: 569 mL        PHYSICAL EXAM:    General:                Comfortable, AAO X 3, in no distress.  HEENT:                  Atraumatic, PERRLA, EOMI, conjunctiva clear.   Neck:                     Supple, no adenopathy, no thyromegaly, no JVD, no bruit.  Back:                     Symmetric, non tender.  Chest:                    Clear, B/L symmetric air entry, no tachypnea  Heart:                     S1, S2 normal, no gallop, no murmur.  Abdomen:              Soft, non-tender, bowel sounds active. No palpable masses.  Extremities:           no cyanosis, no edema. Peripheral pulses normal.  Skin:                      Skin color, texture normal. No rashes.  Neurologic:            Grossly nonfocal.      TELEMETRY:  Normal sinus rhythm with no tachy or scott events     ECG:  NSR, incomplete RBBB, PRWP, no ST T changes of ischemia or infarction.     LABS:                          10.9   11.06 )-----------( 262      ( 2020 18:30 )             33.4     -    128<L>  |  96  |  44<H>  ----------------------------<  382<H>  3.9   |  25  |  2.28<H>    Ca    8.3<L>      2020 18:30    TPro  7.1  /  Alb  2.9<L>  /  TBili  0.4  /  DBili  x   /  AST  7<L>  /  ALT  12  /  AlkPhos  83  -        01-06 @ 18:30  Trop-I  <0.015      PT/INR - ( 2020 18:30 )   PT: 13.4 sec;   INR: 1.20 ratio         PTT - ( 2020 18:30 )  PTT:26.7 sec  Urinalysis Basic - ( 2020 01:46 )    Color: Yellow / Appearance: Clear / S.010 / pH: x  Gluc: x / Ketone: Negative  / Bili: Negative / Urobili: Negative mg/dL   Blood: x / Protein: 15 mg/dL / Nitrite: Negative   Leuk Esterase: Negative / RBC: 0-2 /HPF / WBC 0-2   Sq Epi: x / Non Sq Epi: Few / Bacteria: Few      RADIOLOGY & ADDITIONAL STUDIES:    CXR:  No infiltrate

## 2020-01-08 LAB
ANION GAP SERPL CALC-SCNC: 5 MMOL/L — SIGNIFICANT CHANGE UP (ref 5–17)
BUN SERPL-MCNC: 26 MG/DL — HIGH (ref 7–23)
C DIFF BY PCR RESULT: SIGNIFICANT CHANGE UP
C DIFF TOX GENS STL QL NAA+PROBE: SIGNIFICANT CHANGE UP
CALCIUM SERPL-MCNC: 8.4 MG/DL — LOW (ref 8.5–10.1)
CHLORIDE SERPL-SCNC: 103 MMOL/L — SIGNIFICANT CHANGE UP (ref 96–108)
CO2 SERPL-SCNC: 28 MMOL/L — SIGNIFICANT CHANGE UP (ref 22–31)
CREAT SERPL-MCNC: 1.36 MG/DL — HIGH (ref 0.5–1.3)
CULTURE RESULTS: SIGNIFICANT CHANGE UP
GLUCOSE SERPL-MCNC: 427 MG/DL — HIGH (ref 70–99)
HCT VFR BLD CALC: 33.2 % — LOW (ref 34.5–45)
HGB BLD-MCNC: 11 G/DL — LOW (ref 11.5–15.5)
MCHC RBC-ENTMCNC: 28.5 PG — SIGNIFICANT CHANGE UP (ref 27–34)
MCHC RBC-ENTMCNC: 33.1 GM/DL — SIGNIFICANT CHANGE UP (ref 32–36)
MCV RBC AUTO: 86 FL — SIGNIFICANT CHANGE UP (ref 80–100)
PLATELET # BLD AUTO: 253 K/UL — SIGNIFICANT CHANGE UP (ref 150–400)
POTASSIUM SERPL-MCNC: 3.7 MMOL/L — SIGNIFICANT CHANGE UP (ref 3.5–5.3)
POTASSIUM SERPL-SCNC: 3.7 MMOL/L — SIGNIFICANT CHANGE UP (ref 3.5–5.3)
RBC # BLD: 3.86 M/UL — SIGNIFICANT CHANGE UP (ref 3.8–5.2)
RBC # FLD: 11.9 % — SIGNIFICANT CHANGE UP (ref 10.3–14.5)
SODIUM SERPL-SCNC: 136 MMOL/L — SIGNIFICANT CHANGE UP (ref 135–145)
SPECIMEN SOURCE: SIGNIFICANT CHANGE UP
WBC # BLD: 9.77 K/UL — SIGNIFICANT CHANGE UP (ref 3.8–10.5)
WBC # FLD AUTO: 9.77 K/UL — SIGNIFICANT CHANGE UP (ref 3.8–10.5)

## 2020-01-08 RX ORDER — OXYCODONE AND ACETAMINOPHEN 5; 325 MG/1; MG/1
1 TABLET ORAL ONCE
Refills: 0 | Status: DISCONTINUED | OUTPATIENT
Start: 2020-01-08 | End: 2020-01-09

## 2020-01-08 RX ORDER — INSULIN GLARGINE 100 [IU]/ML
28 INJECTION, SOLUTION SUBCUTANEOUS AT BEDTIME
Refills: 0 | Status: DISCONTINUED | OUTPATIENT
Start: 2020-01-08 | End: 2020-01-09

## 2020-01-08 RX ORDER — OXYCODONE AND ACETAMINOPHEN 5; 325 MG/1; MG/1
1 TABLET ORAL ONCE
Refills: 0 | Status: DISCONTINUED | OUTPATIENT
Start: 2020-01-08 | End: 2020-01-08

## 2020-01-08 RX ADMIN — HEPARIN SODIUM 5000 UNIT(S): 5000 INJECTION INTRAVENOUS; SUBCUTANEOUS at 13:44

## 2020-01-08 RX ADMIN — Medication 50 MILLIGRAM(S): at 06:37

## 2020-01-08 RX ADMIN — Medication 2: at 21:35

## 2020-01-08 RX ADMIN — RISPERIDONE 2 MILLIGRAM(S): 4 TABLET ORAL at 06:36

## 2020-01-08 RX ADMIN — Medication 50 MILLIGRAM(S): at 17:38

## 2020-01-08 RX ADMIN — Medication 650 MILLIGRAM(S): at 09:33

## 2020-01-08 RX ADMIN — AZITHROMYCIN 500 MILLIGRAM(S): 500 TABLET, FILM COATED ORAL at 17:38

## 2020-01-08 RX ADMIN — Medication 75 MILLIGRAM(S): at 21:30

## 2020-01-08 RX ADMIN — Medication 3: at 17:50

## 2020-01-08 RX ADMIN — Medication 5: at 12:19

## 2020-01-08 RX ADMIN — Medication 3: at 08:25

## 2020-01-08 RX ADMIN — HEPARIN SODIUM 5000 UNIT(S): 5000 INJECTION INTRAVENOUS; SUBCUTANEOUS at 06:34

## 2020-01-08 RX ADMIN — HEPARIN SODIUM 5000 UNIT(S): 5000 INJECTION INTRAVENOUS; SUBCUTANEOUS at 21:33

## 2020-01-08 RX ADMIN — ARIPIPRAZOLE 30 MILLIGRAM(S): 15 TABLET ORAL at 21:31

## 2020-01-08 RX ADMIN — OXYCODONE AND ACETAMINOPHEN 1 TABLET(S): 5; 325 TABLET ORAL at 17:56

## 2020-01-08 RX ADMIN — RISPERIDONE 2 MILLIGRAM(S): 4 TABLET ORAL at 17:38

## 2020-01-08 RX ADMIN — Medication 150 MILLIGRAM(S): at 12:10

## 2020-01-08 RX ADMIN — OXYCODONE AND ACETAMINOPHEN 1 TABLET(S): 5; 325 TABLET ORAL at 18:30

## 2020-01-08 RX ADMIN — PREGABALIN 500 MICROGRAM(S): 225 CAPSULE ORAL at 12:10

## 2020-01-08 RX ADMIN — PANTOPRAZOLE SODIUM 40 MILLIGRAM(S): 20 TABLET, DELAYED RELEASE ORAL at 06:34

## 2020-01-08 RX ADMIN — Medication 650 MILLIGRAM(S): at 04:00

## 2020-01-08 RX ADMIN — INSULIN GLARGINE 28 UNIT(S): 100 INJECTION, SOLUTION SUBCUTANEOUS at 21:35

## 2020-01-08 RX ADMIN — Medication 650 MILLIGRAM(S): at 10:00

## 2020-01-08 RX ADMIN — Medication 650 MILLIGRAM(S): at 03:00

## 2020-01-08 RX ADMIN — Medication 325 MILLIGRAM(S): at 21:31

## 2020-01-08 RX ADMIN — Medication 1 MILLIGRAM(S): at 21:31

## 2020-01-08 NOTE — CHART NOTE - NSCHARTNOTEFT_GEN_A_CORE
Pt seen and examined with house staff.  Plan formulated and reviewed on rounds     Briefly,  58 y/o female with HTN, uncontrolled DM, pt of size and s/p gastric bypass admitted with severe dehydration related to infectious diarrhea-- campylobacter and ADINA from hypovolemia.  Treated with aggressive IVF and Abx  Cr down  Sitting in chair  ROS o/w negative     NAD  Awake and alert  Stable vitals    Tighter glucose control--increase lantus--keep FS < 180  PO intake  DC IVF   Doubt CDI  Cont with azithromycin   Cont to follow Cr   OOB  DVT prophy--sqhep    Tx to med surg bed

## 2020-01-08 NOTE — PROGRESS NOTE ADULT - ASSESSMENT
58 yo F as described above presenting with severe fatigue preceding syncopal event admitted for ADINA secondary to severe dehydration.    #ADINA  #Dehydration  - Hypovolemic on admission exam   - Prerenal azotemia at admission   - Severe dehydration in setting of GI losses and poor PO intake  - Cr 2.28 on admission (baseline 0.8-1.1)  -  Cr in decreased trend 1.76>> 1.36   - Discontinue IV hydration   - Encourage increased PO intake  - Zofran prn  - Avoid nephrotoxic agents  - Continue trending Cr    # Gastroenteritis  - GI PCR detected campylobacter species  - Start Azithromycin 500 mg PO for 3 doses ( 2 dose given today)   - Continue IV hydration   - CT abdomen remarkable for  fecalization of distal small bowel content w/o evidence of dilated bowel loops. Cholelithiasis with prominent distention of the gallbladder.     #S/P Syncope  - Likely related to hypovolemic state  - EKG w/o changes   - Telemetry monitoring  -  ECHO remarkable for  LVEF 55%, mild concentric left ventricular hypertrophy. Moderate TVR  - Cardiology consult appreciated   - Trop x 1 neg    #Hyponatremia  - Hypovolemic hyponatremia  - Na 128 on admission, asymptomatic  - Improved to 136, continue monitoring   - Likely due to dehydration in setting of elevated glucose (382)  - Tighter glucose control  - Monitor Na    #Upper Respiratory Infection  - RVP panel positive for entero/rhinovirus  - Supportive therapy  - Cepacol prn    #Diarrhea   -GI PCR detected campylobacter species   - Trend electrolytes  - Continue PO intake     #Leukocytosis  - Improved. WBC 9.77  - Likely related to acute viral syndrome  -  CXR- without abnomalities     #Normocytic anemia  - Chronic, possibly related to hx of gastric bypass surgery (2007)  - On iron and B12 supplementation  - Last colonoscopy was last year, normal as per patient    #Diabetes mellitus  - Gluc 382 on admission  - Off of oral anti DM meds for several days due to poor PO intake  - Hold oral anti-DM meds while inpatient  - Lantus 20 U don't given on 1/7/20  - Start Lantus 28 U at bedtime   - ISS  - BGMs  - Carb consistent diet  -  HgbA1c 9.8     #HTN  - Normotensive  - Hold losartan for now in light of ADINA  - Monitor BP    #COLLEEN  - On CPAP at bedtime    #COPD  - stable  - Supplemental O2  - Continue inhalers  - Duonebs prn    #Pulmonary HTN  - moderate pulm HTN noted on ECHO (Nov 2018)  - Likely related to underlying COPD, COLLEEN    #Bipolar disorder  - Stable   - Cont home meds    #DVT PPx  - Continue Heparin SQ    #Advanced Directives  - Discussed with patient at bedside  - FULL CODE  - HCP : Daughter Ronit    Case discussed with Dr Reilly 60 yo F as described above presenting with severe fatigue preceding syncopal event admitted for ADINA secondary to severe dehydration.    #ADINA  #Dehydration  - Hypovolemic on admission exam   - Prerenal azotemia at admission   - Severe dehydration in setting of GI losses and poor PO intake  - Cr 2.28 on admission (baseline 0.8-1.1)  -  Cr in decreased trend 1.76>> 1.36   - Discontinue IV hydration   - Encourage increased PO intake  - Zofran prn  - Avoid nephrotoxic agents  - Continue trending Cr    # Gastroenteritis  - GI PCR detected campylobacter species  - Start Azithromycin 500 mg PO for 3 doses ( 2nd dose given today)   - Continue PO hydration   - CT abdomen remarkable for  fecalization of distal small bowel content w/o evidence of dilated bowel loops. Cholelithiasis with prominent distention of the gallbladder.     #S/P Syncope  - Likely related to hypovolemic state  - EKG w/o changes   - Telemetry monitoring  -  ECHO remarkable for  LVEF 55%, mild concentric left ventricular hypertrophy. Moderate TVR  - Cardiology consult appreciated   - Trop x 1 neg    #Hyponatremia  - Hypovolemic hyponatremia  - Na 128 on admission, asymptomatic  - Improved to 136, continue monitoring   - Likely due to dehydration in setting of elevated glucose (382)  - Tighter glucose control  - Monitor Na    #Upper Respiratory Infection  - RVP panel positive for entero/rhinovirus  - Supportive therapy  - Cepacol prn    #Diarrhea   -GI PCR detected campylobacter species   - Trend electrolytes  - Continue PO intake     #Leukocytosis  - Improved. WBC 9.77  - Likely related to acute viral syndrome  - CXR- without abnomalities     #Normocytic anemia  - Chronic, possibly related to hx of gastric bypass surgery (2007)  - On iron and B12 supplementation  - Last colonoscopy was last year, normal as per patient    #Diabetes mellitus  - Gluc 382 on admission  - Off of oral anti DM meds for several days due to poor PO intake  - Hold oral anti-DM meds while inpatient  - Lantus 20 U don't given on 1/7/20  - Start Lantus 28 U at bedtime   - ISS  - BGMs  - Carb consistent diet  -  HgbA1c 9.8     #HTN  - Normotensive  - Hold losartan for now in light of ADINA  - Monitor BP    #COLLEEN  - On CPAP at bedtime    #COPD  - stable  - Supplemental O2  - Continue inhalers  - Duonebs prn    #Pulmonary HTN  - moderate pulm HTN noted on ECHO (Nov 2018)  - Likely related to underlying COPD, COLLEEN    #Bipolar disorder  - Stable   - Cont home meds    #DVT PPx  - Continue Heparin SQ    #Advanced Directives  - Discussed with patient at bedside  - FULL CODE  - HCP : Daughter Ronit    Case discussed with Dr Reilly

## 2020-01-08 NOTE — PROGRESS NOTE ADULT - SUBJECTIVE AND OBJECTIVE BOX
HPI:  60 yo obese F with PMHx significant for COPD (dependent on 4L O2 via NC at home), pulmonary HTN, DM, COLLEEN on CPAP, and HTN presents with 2 day history of severe fatigue and weakness preceding a syncopal episode on morning of admission. Pt works as a . Pt was using the computer when she passed out with a  period of LOC for an unknown period. She recalls being awoken by her  and she was advised to leave work and be evaluated. She returned to work for the first time in over 2 weeks and describes feeling very tired. She was off from work initially for the holidays, but then became ill. She was unable to return to work this past 20 because she felt too weak and fatigued. Pt began to experience nausea and vomiting with associated chills on 19. She vomited intermittently, worsened if she had food. She had approx 8 diarrheal BMs daily. Symptoms subsided after about 4 days.  Vomiting has since resolved. Diarrhea still persists, but significantly reduced. She regained her appetite on day of admission. Pt denies any sick contacts or recent travel. She has had some myalgias but denies any abdominal pain, cough or chest pain. Pt has experienced some palpitations.    In the ED, pt was hypotensive to 90/65. She was bolused 1L NS. RVP panel tested positive for entero/rhinovirus. (2020 23:11)    SUBJECTIVE:  Pt is evaluated at bedside and found AAOX3, hemodynamically stable and in no acute distress. She reports continue with watery diarrhea but in less frequency than before. Reported two bowel movements overnight of watery consistency and w/o blood in it.  C difficile sample have not been taking yet. She feel less weak and with more energy than yesterday. She is tolerating PO. Renal function improving. Denies fever, chills, abdominal pain, SOB, chest pain and any other symptoms.     Review of Symptoms  Gen: no fevers, chills, sweats, weight loss, fatigue  Visual: no recent changes in vision, no blurriness, no seeing spots  Cardiovascular: no chest pain, no palpitations, no orthopnea, no leg swelling  Respiratory: no shortness of breath, no exertional dyspnea, no cough, no rhinorrhea, no nasal congestion  GI: + diarrhea, no difficulty swallowing, no nausea, no vomiting, no abdominal pain, no constipation, no melena  : no dysuria, no increased freq, no hematuria, no malodorous urine  Derm: no wounds, no rashes  Heme: no easy bleeding or bruising  MSK: no joint pain, no joint swelling or redness, no extremity pain   Neuro: no headache, no numbness, no weakness, no memory loss  Psych: no depression, no anxiety, no SI    MEDICATIONS  (STANDING):  ALPRAZolam 1 milliGRAM(s) Oral at bedtime  ARIPiprazole 30 milliGRAM(s) Oral at bedtime  azithromycin   Tablet 500 milliGRAM(s) Oral daily  budesonide 160 MICROgram(s)/formoterol 4.5 MICROgram(s) Inhaler 2 Puff(s) Inhalation two times a day  cyanocobalamin 500 MICROGram(s) Oral daily  dextrose 5%. 1000 milliLiter(s) (50 mL/Hr) IV Continuous <Continuous>  dextrose 50% Injectable 12.5 Gram(s) IV Push once  dextrose 50% Injectable 25 Gram(s) IV Push once  dextrose 50% Injectable 25 Gram(s) IV Push once  ferrous    sulfate 325 milliGRAM(s) Oral at bedtime  heparin  Injectable 5000 Unit(s) SubCutaneous three times a day  hydrOXYzine hydrochloride 50 milliGRAM(s) Oral two times a day  insulin glargine Injectable (LANTUS) 28 Unit(s) SubCutaneous at bedtime  insulin lispro (HumaLOG) corrective regimen sliding scale   SubCutaneous three times a day before meals  insulin lispro (HumaLOG) corrective regimen sliding scale   SubCutaneous at bedtime  pantoprazole    Tablet 40 milliGRAM(s) Oral before breakfast  risperiDONE   Tablet 2 milliGRAM(s) Oral two times a day  venlafaxine XR. 75 milliGRAM(s) Oral at bedtime  venlafaxine XR. 150 milliGRAM(s) Oral daily    MEDICATIONS  (PRN):  acetaminophen   Tablet .. 650 milliGRAM(s) Oral every 6 hours PRN Temp greater or equal to 38C (100.4F), Mild Pain (1 - 3)  ALBUTerol    90 MICROgram(s) HFA Inhaler 2 Puff(s) Inhalation every 6 hours PRN for shortness of breath and/or wheezing  benzocaine 15 mG/menthol 3.6 mG (Sugar-Free) Lozenge 1 Lozenge Oral four times a day PRN Sore Throat  dextrose 40% Gel 15 Gram(s) Oral once PRN Blood Glucose LESS THAN 70 milliGRAM(s)/deciliter  glucagon  Injectable 1 milliGRAM(s) IntraMuscular once PRN Glucose LESS THAN 70 milligrams/deciliter  ondansetron   Disintegrating Tablet 4 milliGRAM(s) Oral every 8 hours PRN Nausea and/or Vomiting      Vital Signs Last 24 Hrs  T(C): 37 (2020 16:17), Max: 37.2 (2020 14:26)  T(F): 98.6 (2020 16:17), Max: 99 (2020 14:26)  HR: 103 (2020 19:00) (86 - 119)  BP: 155/95 (2020 17:49) (141/79 - 157/85)  BP(mean): 112 (2020 17:49) (98 - 112)  RR: 23 (2020 19:00) (16 - 28)  SpO2: 98% (2020 19:00) (98% - 100%)    GEN: NAD, comfortable, resting in bed  HEENT: NC/AT, EOMI, PERRLA, MMM, clear conjunctiva and sclera, normal hearing, no nasal discharge, throat clear, no thrush, normal dentition.   NECK: supple, no JVD, no LAD, no thyromegaly  BACK:  ROM intact, no spinal/paraspinal tenderness  CV: S1S2, RRR, no mumur  RESP: good air movement, CTABL, no rales, rhonchi or wheezing, respirations unlabored  ABD: +BS, soft, ND, NT, no guarding, no rigidity, no HSM  EXT: +2 radial and pedal pulses, no edema, no calve tenderness  SKIN: No visible Rashes or Ulcers  MSK: ROM intact in all extremities  NEURO:  sensory and CN 2-12 Grossly intact, no motor deficits, no, saddle anesthesia, AOx3  PSYCH: normal behavior         LABS:                          11.0   9.77  )-----------( 253      ( 2020 09:38 )             33.2     2020 09:38    136    |  103    |  26     ----------------------------<  427    3.7     |  28     |  1.36     Ca    8.4        2020 09:38          CAPILLARY BLOOD GLUCOSE      POCT Blood Glucose.: 274 mg/dL (2020 17:45)  POCT Blood Glucose.: 387 mg/dL (2020 12:15)  POCT Blood Glucose.: 292 mg/dL (2020 08:18)  POCT Blood Glucose.: 279 mg/dL (2020 23:02)        Urinalysis Basic - ( 2020 01:46 )    Color: Yellow / Appearance: Clear / S.010 / pH: x  Gluc: x / Ketone: Negative  / Bili: Negative / Urobili: Negative mg/dL   Blood: x / Protein: 15 mg/dL / Nitrite: Negative   Leuk Esterase: Negative / RBC: 0-2 /HPF / WBC 0-2   Sq Epi: x / Non Sq Epi: Few / Bacteria: Few

## 2020-01-09 VITALS
OXYGEN SATURATION: 100 % | RESPIRATION RATE: 20 BRPM | DIASTOLIC BLOOD PRESSURE: 72 MMHG | SYSTOLIC BLOOD PRESSURE: 128 MMHG | HEART RATE: 98 BPM

## 2020-01-09 LAB
ANION GAP SERPL CALC-SCNC: 6 MMOL/L — SIGNIFICANT CHANGE UP (ref 5–17)
BUN SERPL-MCNC: 17 MG/DL — SIGNIFICANT CHANGE UP (ref 7–23)
CALCIUM SERPL-MCNC: 8.7 MG/DL — SIGNIFICANT CHANGE UP (ref 8.5–10.1)
CHLORIDE SERPL-SCNC: 105 MMOL/L — SIGNIFICANT CHANGE UP (ref 96–108)
CO2 SERPL-SCNC: 26 MMOL/L — SIGNIFICANT CHANGE UP (ref 22–31)
CREAT SERPL-MCNC: 0.88 MG/DL — SIGNIFICANT CHANGE UP (ref 0.5–1.3)
GLUCOSE SERPL-MCNC: 316 MG/DL — HIGH (ref 70–99)
POTASSIUM SERPL-MCNC: 3.3 MMOL/L — LOW (ref 3.5–5.3)
POTASSIUM SERPL-SCNC: 3.3 MMOL/L — LOW (ref 3.5–5.3)
SODIUM SERPL-SCNC: 137 MMOL/L — SIGNIFICANT CHANGE UP (ref 135–145)

## 2020-01-09 RX ORDER — ALPRAZOLAM 0.25 MG
1 TABLET ORAL
Qty: 0 | Refills: 0 | DISCHARGE

## 2020-01-09 RX ORDER — OXYCODONE AND ACETAMINOPHEN 5; 325 MG/1; MG/1
1 TABLET ORAL ONCE
Refills: 0 | Status: DISCONTINUED | OUTPATIENT
Start: 2020-01-09 | End: 2020-01-09

## 2020-01-09 RX ORDER — POTASSIUM CHLORIDE 20 MEQ
40 PACKET (EA) ORAL ONCE
Refills: 0 | Status: COMPLETED | OUTPATIENT
Start: 2020-01-09 | End: 2020-01-09

## 2020-01-09 RX ORDER — INSULIN GLARGINE 100 [IU]/ML
33 INJECTION, SOLUTION SUBCUTANEOUS AT BEDTIME
Refills: 0 | Status: DISCONTINUED | OUTPATIENT
Start: 2020-01-09 | End: 2020-01-09

## 2020-01-09 RX ADMIN — Medication 3: at 16:57

## 2020-01-09 RX ADMIN — OXYCODONE AND ACETAMINOPHEN 1 TABLET(S): 5; 325 TABLET ORAL at 11:13

## 2020-01-09 RX ADMIN — RISPERIDONE 2 MILLIGRAM(S): 4 TABLET ORAL at 16:57

## 2020-01-09 RX ADMIN — Medication 40 MILLIEQUIVALENT(S): at 10:43

## 2020-01-09 RX ADMIN — OXYCODONE AND ACETAMINOPHEN 1 TABLET(S): 5; 325 TABLET ORAL at 01:26

## 2020-01-09 RX ADMIN — Medication 50 MILLIGRAM(S): at 16:57

## 2020-01-09 RX ADMIN — PANTOPRAZOLE SODIUM 40 MILLIGRAM(S): 20 TABLET, DELAYED RELEASE ORAL at 06:03

## 2020-01-09 RX ADMIN — Medication 3: at 12:47

## 2020-01-09 RX ADMIN — Medication 3: at 08:44

## 2020-01-09 RX ADMIN — OXYCODONE AND ACETAMINOPHEN 1 TABLET(S): 5; 325 TABLET ORAL at 00:28

## 2020-01-09 RX ADMIN — Medication 150 MILLIGRAM(S): at 10:47

## 2020-01-09 RX ADMIN — HEPARIN SODIUM 5000 UNIT(S): 5000 INJECTION INTRAVENOUS; SUBCUTANEOUS at 12:48

## 2020-01-09 RX ADMIN — RISPERIDONE 2 MILLIGRAM(S): 4 TABLET ORAL at 06:03

## 2020-01-09 RX ADMIN — BUDESONIDE AND FORMOTEROL FUMARATE DIHYDRATE 2 PUFF(S): 160; 4.5 AEROSOL RESPIRATORY (INHALATION) at 08:18

## 2020-01-09 RX ADMIN — Medication 50 MILLIGRAM(S): at 06:03

## 2020-01-09 RX ADMIN — PREGABALIN 500 MICROGRAM(S): 225 CAPSULE ORAL at 10:47

## 2020-01-09 RX ADMIN — OXYCODONE AND ACETAMINOPHEN 1 TABLET(S): 5; 325 TABLET ORAL at 10:43

## 2020-01-09 RX ADMIN — AZITHROMYCIN 500 MILLIGRAM(S): 500 TABLET, FILM COATED ORAL at 10:47

## 2020-01-09 RX ADMIN — HEPARIN SODIUM 5000 UNIT(S): 5000 INJECTION INTRAVENOUS; SUBCUTANEOUS at 06:02

## 2020-01-09 NOTE — DISCHARGE NOTE PROVIDER - CARE PROVIDER_API CALL
Jak Castillo)  Hematology; Internal Medicine  [address] [place] [address]  Phone: [phone]  Fax: [phone]  Follow Up Time:

## 2020-01-09 NOTE — DISCHARGE NOTE PROVIDER - NSDCCPCAREPLAN_GEN_ALL_CORE_FT
PRINCIPAL DISCHARGE DIAGNOSIS  Diagnosis: Syncope  Assessment and Plan of Treatment: You came to the hospital after you  passed out in your work. You were found to be dehydrated due to frequents watery diarrhea. A stool culture showed infection with a bacteria called campylobacter. You received antibiotic tretament with Azythromycin 500 mg x 3 days. Also you received  intevenous fluid. Your symptoms improved and you will be discharge home.  - Follow up with your primary care doctor in 1-3 days   - Return to emergency room if develop diarrhea, dizziness, shortness of breath, chest pain or any other symptoms.         SECONDARY DISCHARGE DIAGNOSES  Diagnosis: DM (diabetes mellitus)  Assessment and Plan of Treatment: You blood sugar was elevated during admission. HbA1c 9.8 . You will continue with home medication and follow up with PCP in 1-3 days.    Diagnosis: Dehydration  Assessment and Plan of Treatment: You symptoms mstly due to dehydratation due to diarrhea. Improved PRINCIPAL DISCHARGE DIAGNOSIS  Diagnosis: Syncope  Assessment and Plan of Treatment: You came to the hospital after you  passed out in your work. You were found to be dehydrated due to frequents watery diarrhea. A stool culture showed infection with a bacteria called campylobacter. You received antibiotic tretament with Azythromycin 500 mg x 3 days. Also you received  intevenous fluid. Your symptoms improved and you will be discharge home.  - Follow up with your primary care doctor in 1-3 days   - Return to emergency room if develop diarrhea, dizziness, shortness of breath, chest pain or any other symptoms.         SECONDARY DISCHARGE DIAGNOSES  Diagnosis: Lethargy  Assessment and Plan of Treatment: At admission you were found lethargic mostly due to high dose of xanax. We recommend to avoid xanax until you follow up with your PCP for adjustment of dose.    Diagnosis: DM (diabetes mellitus)  Assessment and Plan of Treatment: You blood sugar was elevated during admission. HbA1c 9.8 . You will continue with home medication and follow up with PCP in 1-3 days.    Diagnosis: Dehydration  Assessment and Plan of Treatment: You symptoms mstly due to dehydratation due to diarrhea. Improved

## 2020-01-09 NOTE — DISCHARGE NOTE PROVIDER - HOSPITAL COURSE
58 yo obese F with PMHx significant for COPD (dependent on 4L O2 via NC at home), pulmonary HTN, DM and COLELEN on CPAP came to ED on 1/6/20  for evaluation of two day history of severe fatigue and weakness preceding a syncopal episode on morning of admission. Pt  reported was using the computer when she passed out with a  period of LOC for an unknown period. She also reported watery diarrhea, vomits and decreased PO since  12/27/19 that started after ate a chicken in a restaurant. AT ED arrival pt found hypotensive to 90/65 and received a bolus of 1L NS. Labs performed and remarkable for leukocytosis 11.06 , hyponatremia 128 and Cr 2.28. CT of the abdomen performed and showed  fecalization of distal small bowel w/o evidence of abnormality dilated bowel loops, suggesting a component of low transit. Cardiology consulted and recommended to continue with hydration.  ECHO was performed and showed LVEF 55%, mild concentric  left ventricular hypertrophy and moderate TVR.  GI PCR came positive for campylobacter species and pt received abx treatment with Azithromycin 500 mg x 3 days. C difficile negative. On 1/9/20 labs remarkable for improvement in the renal function Cr 0.88 and resolution of hyponatremia ( Na137). During hospital course BG elevated and adjustment with insulin were made. Hb1AC 9.8 and pt need to continue follow up outpatient with PCP. Pt is hemodynamically stable, with marked clinical improvement and will be discharge home  today 1/9/20.          SUBJECTIVE:    Pt is evaluated at bedside and found NAD. She reports feel better thant previous days. She had a bowel movements one day ago of normal consistency and w/o blood in it. She only reports a tooth ache for a long time. She has an appt with dentist soon. Denies fever, chills, abdominal pain, constipation, diarrhea, dizziness, SOB, chest pain or any other symptoms.         Vitals    T(F): 98.8 (01-09-20 @ 14:30), Max: 99.3 (01-08-20 @ 21:19)    HR: 103 (01-09-20 @ 09:00) (86 - 119)    BP: 158/94 (01-09-20 @ 08:15) (144/98 - 158/94)    RR: 16 (01-09-20 @ 09:00) (14 - 26)    SpO2: 100% (01-09-20 @ 09:00) (98% - 100%)        Physical Exam     Gen: NAD, comfortable    HENT: atraumatic head and ears, no gross abnormalities of ears, mucous membranes moist, left side check swelling due to tooth " abscess" , neck supple without masses/goiter/lymphadenopathy    CV: RRR, nl s1/s2, no M/R/G    Pulm: nl respiratory effort, CTAB, no wheezes/crackles/rhonchi    Back: no scoliosis, lordosis, or kyphosis, no tenderness    Abd: normoactive bowel sounds in all 4 quadrants, soft, nontender, nondistended, no rebound, no guarding, no masses    Extremities: no pedal edema, pedal pulses palpable     Skin: nl warm and dry, no wounds     Neuro: A&Ox3, answering questions appropriately, PERRL, EOMI, face symmetric, sensation equal bilaterally in face, tongue midline, no dysarthria, 5/5 strength in upper and lower extremities bilaterally, sensation intact in upper and lower extremities bilaterally, nl finger to nose, and nl heel to shin     Pysch: no depression, no SI, no HI        RADIOLOGIC:    < from: CT Abdomen and Pelvis No Cont (01.07.20 @ 14:10) >            IMPRESSION:         Postoperative changes status post gastric bypass. No evidence of bowel obstruction.        Fecalization of distal small bowel contents, without evidence of abnormally dilated bowel loops, suggesting a component of slow transit.        Cholelithiasis with prominent distention of the gallbladder. Correlate with clinical and laboratory findings.        < end of copied text > 58 yo obese F with PMHx significant for COPD (dependent on 4L O2 via NC at home), pulmonary HTN, DM and COLLEEN on CPAP came to ED on 1/6/20  for evaluation of two day history of severe fatigue and weakness preceding a syncopal episode on morning of admission. Pt  reported was using the computer when she passed out with a  period of LOC for an unknown period. She also reported watery diarrhea, vomits and decreased PO since  12/27/19 that started after ate a chicken in a restaurant. AT ED arrival pt found hypotensive to 90/65 and received a bolus of 1L NS. Labs performed and remarkable for leukocytosis 11.06 , hyponatremia 128 and Cr 2.28. CT of the abdomen performed and showed  fecalization of distal small bowel w/o evidence of abnormality dilated bowel loops, suggesting a component of low transit. Cardiology consulted and recommended to continue with hydration.  ECHO was performed and showed LVEF 55%, mild concentric  left ventricular hypertrophy and moderate TVR.  GI PCR came positive for campylobacter species and pt received abx treatment with Azithromycin 500 mg x 3 days. C difficile negative. On 1/9/20 labs remarkable for improvement in the renal function Cr 0.88 and resolution of hyponatremia ( Na137). During hospital course BG elevated and adjustment with insulin were made. Hb1AC 9.8 and pt need to continue follow up outpatient with PCP. Pt also at admission found lethargic  due to high dose of alprazolam. We recommended to discontinue it until  follow up with PCP. Pt is hemodynamically stable, with marked clinical improvement and will be discharge home  today 1/9/20.          SUBJECTIVE:    Pt is evaluated at bedside and found NAD. She reports feel better thant previous days. She had a bowel movements one day ago of normal consistency and w/o blood in it. She only reports a tooth ache for a long time. She has an appt with dentist soon. Denies fever, chills, abdominal pain, constipation, diarrhea, dizziness, SOB, chest pain or any other symptoms.         Vitals    T(F): 98.8 (01-09-20 @ 14:30), Max: 99.3 (01-08-20 @ 21:19)    HR: 103 (01-09-20 @ 09:00) (86 - 119)    BP: 158/94 (01-09-20 @ 08:15) (144/98 - 158/94)    RR: 16 (01-09-20 @ 09:00) (14 - 26)    SpO2: 100% (01-09-20 @ 09:00) (98% - 100%)        Physical Exam     Gen: NAD, comfortable    HENT: atraumatic head and ears, no gross abnormalities of ears, mucous membranes moist, left side check swelling due to tooth " abscess" , neck supple without masses/goiter/lymphadenopathy    CV: RRR, nl s1/s2, no M/R/G    Pulm: nl respiratory effort, CTAB, no wheezes/crackles/rhonchi    Back: no scoliosis, lordosis, or kyphosis, no tenderness    Abd: normoactive bowel sounds in all 4 quadrants, soft, nontender, nondistended, no rebound, no guarding, no masses    Extremities: no pedal edema, pedal pulses palpable     Skin: nl warm and dry, no wounds     Neuro: A&Ox3, answering questions appropriately, PERRL, EOMI, face symmetric, sensation equal bilaterally in face, tongue midline, no dysarthria, 5/5 strength in upper and lower extremities bilaterally, sensation intact in upper and lower extremities bilaterally, nl finger to nose, and nl heel to shin     Pysch: no depression, no SI, no HI        RADIOLOGIC:    < from: CT Abdomen and Pelvis No Cont (01.07.20 @ 14:10) >            IMPRESSION:         Postoperative changes status post gastric bypass. No evidence of bowel obstruction.        Fecalization of distal small bowel contents, without evidence of abnormally dilated bowel loops, suggesting a component of slow transit.        Cholelithiasis with prominent distention of the gallbladder. Correlate with clinical and laboratory findings.        < end of copied text >

## 2020-01-09 NOTE — CHART NOTE - NSCHARTNOTEFT_GEN_A_CORE
Pt seen and examined with house staff.  Plan formulated and reviewed on rounds    Briefly,  58 y/o female with HTN, uncontrolled DM, pt of size and s/p gastric bypass admitted with severe dehydration related to infectious diarrhea-- campylobacter and ADINA from hypovolemia.  Treated with aggressive IVF and Abx  Cr normal  Sitting in chair  Developed worsening L toothache/abscess    NAD  Awake and alert  Stable vitals  L facial swelling   Patent airway    No CDI    PO intake  Completed 3 days of azithromycin  Better glucose control  Pt will call today and make appt with DDS upon DC home today  OOB  DVT prophy--sqhep    DC home with follow up with dr esparza and DDS

## 2020-01-09 NOTE — DISCHARGE NOTE PROVIDER - NSDCMRMEDTOKEN_GEN_ALL_CORE_FT
albuterol 90 mcg/inh inhalation aerosol: 2 puff(s) inhaled 4 times a day, As Needed - for shortness of breath and/or wheezing  ARIPiprazole 15 mg oral tablet: 2 tab(s) orally once a day  Breo Ellipta 100 mcg-25 mcg/inh inhalation powder: 1 puff(s) inhaled once a day  citalopram 20 mg oral tablet: 1 tab(s) orally 2 times a day  cyanocobalamin 500 mcg oral tablet: 1 tab(s) orally once a day  ferrous sulfate 325 mg (65 mg elemental iron) oral tablet: 1 tab(s) orally once a day (at bedtime)  Fiber Tabs 625 mg oral tablet: 2 tab(s) orally 2 times a day  glimepiride 4 mg oral tablet: 2 tab(s) orally once a day  hydrOXYzine pamoate 50 mg oral capsule: 1 cap(s) orally 2 times a day  losartan 100 mg oral tablet: 1 tab(s) orally once a day  metFORMIN 1000 mg oral tablet: 1 tab(s) orally 2 times a day  ondansetron 4 mg oral tablet: 1 tab(s) orally every 8 hours, As Needed  pantoprazole 40 mg oral delayed release tablet: 1 tab(s) orally once a day  pioglitazone 45 mg oral tablet: 1 tab(s) orally once a day  risperiDONE 2 mg oral tablet: 1 tab(s) orally 2 times a day  venlafaxine 75 mg oral capsule, extended release: 2 cap(s) orally once a day (in the morning)  venlafaxine 75 mg oral capsule, extended release: 1 cap(s) orally once a day (at bedtime)

## 2020-01-09 NOTE — DISCHARGE NOTE NURSING/CASE MANAGEMENT/SOCIAL WORK - PATIENT PORTAL LINK FT
You can access the FollowMyHealth Patient Portal offered by [facility] by registering at the following website: [email or web address]. By joining [facility]’s FollowMyHealth portal, you will also be able to view your health information using other applications (apps) compatible with our system.

## 2020-01-14 DIAGNOSIS — Z79.899 OTHER LONG TERM (CURRENT) DRUG THERAPY: ICD-10-CM

## 2020-01-14 DIAGNOSIS — I10 ESSENTIAL (PRIMARY) HYPERTENSION: ICD-10-CM

## 2020-01-14 DIAGNOSIS — E87.1 HYPO-OSMOLALITY AND HYPONATREMIA: ICD-10-CM

## 2020-01-14 DIAGNOSIS — D72.829 ELEVATED WHITE BLOOD CELL COUNT, UNSPECIFIED: ICD-10-CM

## 2020-01-14 DIAGNOSIS — D64.9 ANEMIA, UNSPECIFIED: ICD-10-CM

## 2020-01-14 DIAGNOSIS — F31.9 BIPOLAR DISORDER, UNSPECIFIED: ICD-10-CM

## 2020-01-14 DIAGNOSIS — Z79.84 LONG TERM (CURRENT) USE OF ORAL HYPOGLYCEMIC DRUGS: ICD-10-CM

## 2020-01-14 DIAGNOSIS — E66.9 OBESITY, UNSPECIFIED: ICD-10-CM

## 2020-01-14 DIAGNOSIS — E11.9 TYPE 2 DIABETES MELLITUS WITHOUT COMPLICATIONS: ICD-10-CM

## 2020-01-14 DIAGNOSIS — J06.9 ACUTE UPPER RESPIRATORY INFECTION, UNSPECIFIED: ICD-10-CM

## 2020-01-14 DIAGNOSIS — A08.4 VIRAL INTESTINAL INFECTION, UNSPECIFIED: ICD-10-CM

## 2020-01-14 DIAGNOSIS — I07.1 RHEUMATIC TRICUSPID INSUFFICIENCY: ICD-10-CM

## 2020-01-14 DIAGNOSIS — R55 SYNCOPE AND COLLAPSE: ICD-10-CM

## 2020-01-14 DIAGNOSIS — N17.9 ACUTE KIDNEY FAILURE, UNSPECIFIED: ICD-10-CM

## 2020-01-14 DIAGNOSIS — I27.20 PULMONARY HYPERTENSION, UNSPECIFIED: ICD-10-CM

## 2020-01-14 DIAGNOSIS — Z99.81 DEPENDENCE ON SUPPLEMENTAL OXYGEN: ICD-10-CM

## 2020-01-14 DIAGNOSIS — J44.9 CHRONIC OBSTRUCTIVE PULMONARY DISEASE, UNSPECIFIED: ICD-10-CM

## 2020-01-14 DIAGNOSIS — E86.0 DEHYDRATION: ICD-10-CM

## 2020-01-14 DIAGNOSIS — G47.33 OBSTRUCTIVE SLEEP APNEA (ADULT) (PEDIATRIC): ICD-10-CM

## 2021-08-03 NOTE — ED PROVIDER NOTE - NS ED MD DISPO ISOLATION TYPES
Droplet

## 2021-09-23 ENCOUNTER — INPATIENT (INPATIENT)
Facility: HOSPITAL | Age: 61
LOS: 6 days | Discharge: HOME | DRG: 638 | End: 2021-09-30
Attending: FAMILY MEDICINE
Payer: COMMERCIAL

## 2021-09-23 VITALS
TEMPERATURE: 98 F | HEART RATE: 81 BPM | RESPIRATION RATE: 19 BRPM | DIASTOLIC BLOOD PRESSURE: 60 MMHG | HEIGHT: 70 IN | OXYGEN SATURATION: 95 % | SYSTOLIC BLOOD PRESSURE: 111 MMHG | WEIGHT: 240.08 LBS
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
## 2021-09-23 DIAGNOSIS — Z98.84 BARIATRIC SURGERY STATUS: ICD-10-CM

## 2021-09-23 DIAGNOSIS — Z79.4 LONG TERM (CURRENT) USE OF INSULIN: ICD-10-CM

## 2021-09-23 DIAGNOSIS — G47.33 OBSTRUCTIVE SLEEP APNEA (ADULT) (PEDIATRIC): ICD-10-CM

## 2021-09-23 DIAGNOSIS — B95.61 METHICILLIN SUSCEPTIBLE STAPHYLOCOCCUS AUREUS INFECTION AS THE CAUSE OF DISEASES CLASSIFIED ELSEWHERE: ICD-10-CM

## 2021-09-23 DIAGNOSIS — E87.6 HYPOKALEMIA: ICD-10-CM

## 2021-09-23 DIAGNOSIS — Z91.018 ALLERGY TO OTHER FOODS: ICD-10-CM

## 2021-09-23 DIAGNOSIS — R78.81 BACTEREMIA: ICD-10-CM

## 2021-09-23 DIAGNOSIS — E66.9 OBESITY, UNSPECIFIED: ICD-10-CM

## 2021-09-23 DIAGNOSIS — E86.0 DEHYDRATION: ICD-10-CM

## 2021-09-23 DIAGNOSIS — L03.113 CELLULITIS OF RIGHT UPPER LIMB: ICD-10-CM

## 2021-09-23 DIAGNOSIS — Z99.81 DEPENDENCE ON SUPPLEMENTAL OXYGEN: ICD-10-CM

## 2021-09-23 DIAGNOSIS — F32.9 MAJOR DEPRESSIVE DISORDER, SINGLE EPISODE, UNSPECIFIED: ICD-10-CM

## 2021-09-23 DIAGNOSIS — E83.42 HYPOMAGNESEMIA: ICD-10-CM

## 2021-09-23 DIAGNOSIS — T82.7XXA INFECTION AND INFLAMMATORY REACTION DUE TO OTHER CARDIAC AND VASCULAR DEVICES, IMPLANTS AND GRAFTS, INITIAL ENCOUNTER: ICD-10-CM

## 2021-09-23 DIAGNOSIS — R94.31 ABNORMAL ELECTROCARDIOGRAM [ECG] [EKG]: ICD-10-CM

## 2021-09-23 DIAGNOSIS — R73.9 HYPERGLYCEMIA, UNSPECIFIED: ICD-10-CM

## 2021-09-23 DIAGNOSIS — Z98.84 BARIATRIC SURGERY STATUS: Chronic | ICD-10-CM

## 2021-09-23 DIAGNOSIS — J44.9 CHRONIC OBSTRUCTIVE PULMONARY DISEASE, UNSPECIFIED: ICD-10-CM

## 2021-09-23 DIAGNOSIS — E11.00 TYPE 2 DIABETES MELLITUS WITH HYPEROSMOLARITY WITHOUT NONKETOTIC HYPERGLYCEMIC-HYPEROSMOLAR COMA (NKHHC): ICD-10-CM

## 2021-09-23 DIAGNOSIS — E87.1 HYPO-OSMOLALITY AND HYPONATREMIA: ICD-10-CM

## 2021-09-23 DIAGNOSIS — N17.9 ACUTE KIDNEY FAILURE, UNSPECIFIED: ICD-10-CM

## 2021-09-23 DIAGNOSIS — D64.9 ANEMIA, UNSPECIFIED: ICD-10-CM

## 2021-09-23 DIAGNOSIS — F90.9 ATTENTION-DEFICIT HYPERACTIVITY DISORDER, UNSPECIFIED TYPE: ICD-10-CM

## 2021-09-23 DIAGNOSIS — I27.20 PULMONARY HYPERTENSION, UNSPECIFIED: ICD-10-CM

## 2021-09-23 DIAGNOSIS — I10 ESSENTIAL (PRIMARY) HYPERTENSION: ICD-10-CM

## 2021-09-23 DIAGNOSIS — F41.9 ANXIETY DISORDER, UNSPECIFIED: ICD-10-CM

## 2021-09-23 DIAGNOSIS — I82.611 ACUTE EMBOLISM AND THROMBOSIS OF SUPERFICIAL VEINS OF RIGHT UPPER EXTREMITY: ICD-10-CM

## 2021-09-23 DIAGNOSIS — I45.10 UNSPECIFIED RIGHT BUNDLE-BRANCH BLOCK: ICD-10-CM

## 2021-09-23 DIAGNOSIS — E83.39 OTHER DISORDERS OF PHOSPHORUS METABOLISM: ICD-10-CM

## 2021-09-23 DIAGNOSIS — I80.8 PHLEBITIS AND THROMBOPHLEBITIS OF OTHER SITES: ICD-10-CM

## 2021-09-23 LAB
ACETONE SERPL-MCNC: ABNORMAL
ADD ON TEST-SPECIMEN IN LAB: SIGNIFICANT CHANGE UP
ALBUMIN SERPL ELPH-MCNC: 3.4 G/DL — SIGNIFICANT CHANGE UP (ref 3.3–5)
ALP SERPL-CCNC: 86 U/L — SIGNIFICANT CHANGE UP (ref 40–120)
ALT FLD-CCNC: 22 U/L — SIGNIFICANT CHANGE UP (ref 12–78)
ANION GAP SERPL CALC-SCNC: 10 MMOL/L — SIGNIFICANT CHANGE UP (ref 5–17)
ANION GAP SERPL CALC-SCNC: 7 MMOL/L — SIGNIFICANT CHANGE UP (ref 5–17)
APPEARANCE UR: CLEAR — SIGNIFICANT CHANGE UP
APTT BLD: 26.3 SEC — LOW (ref 27.5–35.5)
AST SERPL-CCNC: 10 U/L — LOW (ref 15–37)
BASE EXCESS BLDV CALC-SCNC: 2.8 MMOL/L — SIGNIFICANT CHANGE UP
BASOPHILS # BLD AUTO: 0.04 K/UL — SIGNIFICANT CHANGE UP (ref 0–0.2)
BASOPHILS NFR BLD AUTO: 0.4 % — SIGNIFICANT CHANGE UP (ref 0–2)
BILIRUB SERPL-MCNC: 0.5 MG/DL — SIGNIFICANT CHANGE UP (ref 0.2–1.2)
BILIRUB UR-MCNC: NEGATIVE — SIGNIFICANT CHANGE UP
BUN SERPL-MCNC: 28 MG/DL — HIGH (ref 7–23)
BUN SERPL-MCNC: 32 MG/DL — HIGH (ref 7–23)
CALCIUM SERPL-MCNC: 7.3 MG/DL — LOW (ref 8.5–10.1)
CALCIUM SERPL-MCNC: 7.7 MG/DL — LOW (ref 8.5–10.1)
CHLORIDE SERPL-SCNC: 101 MMOL/L — SIGNIFICANT CHANGE UP (ref 96–108)
CHLORIDE SERPL-SCNC: 90 MMOL/L — LOW (ref 96–108)
CO2 BLDV-SCNC: 32 MMOL/L — HIGH (ref 22–26)
CO2 SERPL-SCNC: 30 MMOL/L — SIGNIFICANT CHANGE UP (ref 22–31)
CO2 SERPL-SCNC: 30 MMOL/L — SIGNIFICANT CHANGE UP (ref 22–31)
COLOR SPEC: YELLOW — SIGNIFICANT CHANGE UP
CREAT SERPL-MCNC: 1.44 MG/DL — HIGH (ref 0.5–1.3)
CREAT SERPL-MCNC: 1.98 MG/DL — HIGH (ref 0.5–1.3)
DIFF PNL FLD: ABNORMAL
EOSINOPHIL # BLD AUTO: 0.08 K/UL — SIGNIFICANT CHANGE UP (ref 0–0.5)
EOSINOPHIL NFR BLD AUTO: 0.8 % — SIGNIFICANT CHANGE UP (ref 0–6)
GLUCOSE SERPL-MCNC: 158 MG/DL — HIGH (ref 70–99)
GLUCOSE SERPL-MCNC: 663 MG/DL — CRITICAL HIGH (ref 70–99)
GLUCOSE UR QL: 1000 MG/DL
HCO3 BLDV-SCNC: 30 MMOL/L — HIGH (ref 22–29)
HCT VFR BLD CALC: 34.2 % — LOW (ref 34.5–45)
HGB BLD-MCNC: 11 G/DL — LOW (ref 11.5–15.5)
IMM GRANULOCYTES NFR BLD AUTO: 0.4 % — SIGNIFICANT CHANGE UP (ref 0–1.5)
INR BLD: 1.01 RATIO — SIGNIFICANT CHANGE UP (ref 0.88–1.16)
KETONES UR-MCNC: ABNORMAL
LACTATE SERPL-SCNC: 2 MMOL/L — SIGNIFICANT CHANGE UP (ref 0.7–2)
LEUKOCYTE ESTERASE UR-ACNC: NEGATIVE — SIGNIFICANT CHANGE UP
LIDOCAIN IGE QN: 85 U/L — SIGNIFICANT CHANGE UP (ref 73–393)
LYMPHOCYTES # BLD AUTO: 1.45 K/UL — SIGNIFICANT CHANGE UP (ref 1–3.3)
LYMPHOCYTES # BLD AUTO: 14.5 % — SIGNIFICANT CHANGE UP (ref 13–44)
MAGNESIUM SERPL-MCNC: 1.3 MG/DL — LOW (ref 1.6–2.6)
MAGNESIUM SERPL-MCNC: 1.7 MG/DL — SIGNIFICANT CHANGE UP (ref 1.6–2.6)
MCHC RBC-ENTMCNC: 26.9 PG — LOW (ref 27–34)
MCHC RBC-ENTMCNC: 32.2 GM/DL — SIGNIFICANT CHANGE UP (ref 32–36)
MCV RBC AUTO: 83.6 FL — SIGNIFICANT CHANGE UP (ref 80–100)
MONOCYTES # BLD AUTO: 0.71 K/UL — SIGNIFICANT CHANGE UP (ref 0–0.9)
MONOCYTES NFR BLD AUTO: 7.1 % — SIGNIFICANT CHANGE UP (ref 2–14)
NEUTROPHILS # BLD AUTO: 7.67 K/UL — HIGH (ref 1.8–7.4)
NEUTROPHILS NFR BLD AUTO: 76.8 % — SIGNIFICANT CHANGE UP (ref 43–77)
NITRITE UR-MCNC: NEGATIVE — SIGNIFICANT CHANGE UP
PCO2 BLDV: 57 MMHG — HIGH (ref 39–42)
PH BLDV: 7.33 — SIGNIFICANT CHANGE UP (ref 7.32–7.43)
PH UR: 6.5 — SIGNIFICANT CHANGE UP (ref 5–8)
PHOSPHATE SERPL-MCNC: 1.3 MG/DL — LOW (ref 2.5–4.5)
PLATELET # BLD AUTO: 266 K/UL — SIGNIFICANT CHANGE UP (ref 150–400)
PO2 BLDV: 60 MMHG — SIGNIFICANT CHANGE UP
POTASSIUM SERPL-MCNC: 1.7 MMOL/L — CRITICAL LOW (ref 3.5–5.3)
POTASSIUM SERPL-MCNC: 1.8 MMOL/L — CRITICAL LOW (ref 3.5–5.3)
POTASSIUM SERPL-SCNC: 1.7 MMOL/L — CRITICAL LOW (ref 3.5–5.3)
POTASSIUM SERPL-SCNC: 1.8 MMOL/L — CRITICAL LOW (ref 3.5–5.3)
PROT SERPL-MCNC: 7.1 GM/DL — SIGNIFICANT CHANGE UP (ref 6–8.3)
PROT UR-MCNC: 30 MG/DL
PROTHROM AB SERPL-ACNC: 11.7 SEC — SIGNIFICANT CHANGE UP (ref 10.6–13.6)
RBC # BLD: 4.09 M/UL — SIGNIFICANT CHANGE UP (ref 3.8–5.2)
RBC # FLD: 12.1 % — SIGNIFICANT CHANGE UP (ref 10.3–14.5)
SAO2 % BLDV: SIGNIFICANT CHANGE UP %
SARS-COV-2 RNA SPEC QL NAA+PROBE: SIGNIFICANT CHANGE UP
SODIUM SERPL-SCNC: 130 MMOL/L — LOW (ref 135–145)
SODIUM SERPL-SCNC: 138 MMOL/L — SIGNIFICANT CHANGE UP (ref 135–145)
SP GR SPEC: 1.01 — SIGNIFICANT CHANGE UP (ref 1.01–1.02)
TROPONIN I SERPL-MCNC: 0.04 NG/ML — SIGNIFICANT CHANGE UP (ref 0.01–0.04)
UROBILINOGEN FLD QL: NEGATIVE MG/DL — SIGNIFICANT CHANGE UP
WBC # BLD: 9.99 K/UL — SIGNIFICANT CHANGE UP (ref 3.8–10.5)
WBC # FLD AUTO: 9.99 K/UL — SIGNIFICANT CHANGE UP (ref 3.8–10.5)

## 2021-09-23 PROCEDURE — 90686 IIV4 VACC NO PRSV 0.5 ML IM: CPT

## 2021-09-23 PROCEDURE — 83930 ASSAY OF BLOOD OSMOLALITY: CPT

## 2021-09-23 PROCEDURE — 82962 GLUCOSE BLOOD TEST: CPT

## 2021-09-23 PROCEDURE — 93010 ELECTROCARDIOGRAM REPORT: CPT

## 2021-09-23 PROCEDURE — 86334 IMMUNOFIX E-PHORESIS SERUM: CPT

## 2021-09-23 PROCEDURE — 86769 SARS-COV-2 COVID-19 ANTIBODY: CPT

## 2021-09-23 PROCEDURE — 85025 COMPLETE CBC W/AUTO DIFF WBC: CPT

## 2021-09-23 PROCEDURE — 84100 ASSAY OF PHOSPHORUS: CPT

## 2021-09-23 PROCEDURE — 71045 X-RAY EXAM CHEST 1 VIEW: CPT | Mod: 26

## 2021-09-23 PROCEDURE — 84156 ASSAY OF PROTEIN URINE: CPT

## 2021-09-23 PROCEDURE — 83935 ASSAY OF URINE OSMOLALITY: CPT

## 2021-09-23 PROCEDURE — 94640 AIRWAY INHALATION TREATMENT: CPT

## 2021-09-23 PROCEDURE — 99285 EMERGENCY DEPT VISIT HI MDM: CPT

## 2021-09-23 PROCEDURE — 87186 SC STD MICRODIL/AGAR DIL: CPT

## 2021-09-23 PROCEDURE — 93971 EXTREMITY STUDY: CPT | Mod: RT

## 2021-09-23 PROCEDURE — 87150 DNA/RNA AMPLIFIED PROBE: CPT

## 2021-09-23 PROCEDURE — 80053 COMPREHEN METABOLIC PANEL: CPT

## 2021-09-23 PROCEDURE — 83735 ASSAY OF MAGNESIUM: CPT

## 2021-09-23 PROCEDURE — 80202 ASSAY OF VANCOMYCIN: CPT

## 2021-09-23 PROCEDURE — 99223 1ST HOSP IP/OBS HIGH 75: CPT | Mod: GC

## 2021-09-23 PROCEDURE — 85027 COMPLETE CBC AUTOMATED: CPT

## 2021-09-23 PROCEDURE — 80048 BASIC METABOLIC PNL TOTAL CA: CPT

## 2021-09-23 PROCEDURE — 83883 ASSAY NEPHELOMETRY NOT SPEC: CPT

## 2021-09-23 PROCEDURE — 87040 BLOOD CULTURE FOR BACTERIA: CPT

## 2021-09-23 PROCEDURE — 93005 ELECTROCARDIOGRAM TRACING: CPT

## 2021-09-23 PROCEDURE — 82570 ASSAY OF URINE CREATININE: CPT

## 2021-09-23 PROCEDURE — 84133 ASSAY OF URINE POTASSIUM: CPT

## 2021-09-23 PROCEDURE — 36415 COLL VENOUS BLD VENIPUNCTURE: CPT

## 2021-09-23 RX ORDER — ALBUTEROL 90 UG/1
2 AEROSOL, METERED ORAL
Qty: 0 | Refills: 0 | DISCHARGE

## 2021-09-23 RX ORDER — INSULIN HUMAN 100 [IU]/ML
25 INJECTION, SOLUTION SUBCUTANEOUS ONCE
Refills: 0 | Status: COMPLETED | OUTPATIENT
Start: 2021-09-23 | End: 2021-09-23

## 2021-09-23 RX ORDER — CITALOPRAM 10 MG/1
3 TABLET, FILM COATED ORAL
Qty: 0 | Refills: 0 | DISCHARGE

## 2021-09-23 RX ORDER — VENLAFAXINE HCL 75 MG
1 CAPSULE, EXT RELEASE 24 HR ORAL
Qty: 0 | Refills: 0 | DISCHARGE

## 2021-09-23 RX ORDER — MAGNESIUM SULFATE 500 MG/ML
2 VIAL (ML) INJECTION ONCE
Refills: 0 | Status: COMPLETED | OUTPATIENT
Start: 2021-09-23 | End: 2021-09-23

## 2021-09-23 RX ORDER — PREGABALIN 225 MG/1
500 CAPSULE ORAL DAILY
Refills: 0 | Status: DISCONTINUED | OUTPATIENT
Start: 2021-09-23 | End: 2021-09-30

## 2021-09-23 RX ORDER — INSULIN GLARGINE 100 [IU]/ML
25 INJECTION, SOLUTION SUBCUTANEOUS ONCE
Refills: 0 | Status: COMPLETED | OUTPATIENT
Start: 2021-09-23 | End: 2021-09-23

## 2021-09-23 RX ORDER — DEXTROSE 50 % IN WATER 50 %
15 SYRINGE (ML) INTRAVENOUS ONCE
Refills: 0 | Status: DISCONTINUED | OUTPATIENT
Start: 2021-09-23 | End: 2021-09-30

## 2021-09-23 RX ORDER — PIOGLITAZONE HYDROCHLORIDE 15 MG/1
1 TABLET ORAL
Qty: 0 | Refills: 0 | DISCHARGE

## 2021-09-23 RX ORDER — POTASSIUM CHLORIDE 20 MEQ
10 PACKET (EA) ORAL
Refills: 0 | Status: COMPLETED | OUTPATIENT
Start: 2021-09-23 | End: 2021-09-23

## 2021-09-23 RX ORDER — MAGNESIUM SULFATE 500 MG/ML
4 VIAL (ML) INJECTION ONCE
Refills: 0 | Status: DISCONTINUED | OUTPATIENT
Start: 2021-09-23 | End: 2021-09-23

## 2021-09-23 RX ORDER — CALCIUM POLYCARBOPHIL 625 MG/1
2 TABLET, FILM COATED ORAL
Qty: 0 | Refills: 0 | DISCHARGE

## 2021-09-23 RX ORDER — INSULIN LISPRO 100/ML
VIAL (ML) SUBCUTANEOUS AT BEDTIME
Refills: 0 | Status: DISCONTINUED | OUTPATIENT
Start: 2021-09-23 | End: 2021-09-30

## 2021-09-23 RX ORDER — ARIPIPRAZOLE 15 MG/1
30 TABLET ORAL DAILY
Refills: 0 | Status: DISCONTINUED | OUTPATIENT
Start: 2021-09-23 | End: 2021-09-30

## 2021-09-23 RX ORDER — LATANOPROST 0.05 MG/ML
1 SOLUTION/ DROPS OPHTHALMIC; TOPICAL AT BEDTIME
Refills: 0 | Status: DISCONTINUED | OUTPATIENT
Start: 2021-09-23 | End: 2021-09-30

## 2021-09-23 RX ORDER — RISPERIDONE 4 MG/1
1 TABLET ORAL
Qty: 0 | Refills: 0 | DISCHARGE

## 2021-09-23 RX ORDER — HYDROXYZINE HCL 10 MG
1 TABLET ORAL
Qty: 0 | Refills: 0 | DISCHARGE

## 2021-09-23 RX ORDER — LATANOPROST 0.05 MG/ML
1 SOLUTION/ DROPS OPHTHALMIC; TOPICAL
Qty: 0 | Refills: 0 | DISCHARGE

## 2021-09-23 RX ORDER — GLUCAGON INJECTION, SOLUTION 0.5 MG/.1ML
1 INJECTION, SOLUTION SUBCUTANEOUS ONCE
Refills: 0 | Status: DISCONTINUED | OUTPATIENT
Start: 2021-09-23 | End: 2021-09-30

## 2021-09-23 RX ORDER — DEXTROSE MONOHYDRATE, SODIUM CHLORIDE, AND POTASSIUM CHLORIDE 50; .745; 4.5 G/1000ML; G/1000ML; G/1000ML
1000 INJECTION, SOLUTION INTRAVENOUS
Refills: 0 | Status: DISCONTINUED | OUTPATIENT
Start: 2021-09-23 | End: 2021-09-24

## 2021-09-23 RX ORDER — POTASSIUM CHLORIDE 20 MEQ
40 PACKET (EA) ORAL ONCE
Refills: 0 | Status: COMPLETED | OUTPATIENT
Start: 2021-09-23 | End: 2021-09-23

## 2021-09-23 RX ORDER — DEXTROSE 50 % IN WATER 50 %
25 SYRINGE (ML) INTRAVENOUS ONCE
Refills: 0 | Status: DISCONTINUED | OUTPATIENT
Start: 2021-09-23 | End: 2021-09-30

## 2021-09-23 RX ORDER — HEPARIN SODIUM 5000 [USP'U]/ML
5000 INJECTION INTRAVENOUS; SUBCUTANEOUS EVERY 12 HOURS
Refills: 0 | Status: DISCONTINUED | OUTPATIENT
Start: 2021-09-23 | End: 2021-09-23

## 2021-09-23 RX ORDER — SODIUM CHLORIDE 9 MG/ML
1000 INJECTION, SOLUTION INTRAVENOUS
Refills: 0 | Status: DISCONTINUED | OUTPATIENT
Start: 2021-09-23 | End: 2021-09-30

## 2021-09-23 RX ORDER — INSULIN LISPRO 100/ML
VIAL (ML) SUBCUTANEOUS
Refills: 0 | Status: DISCONTINUED | OUTPATIENT
Start: 2021-09-23 | End: 2021-09-30

## 2021-09-23 RX ORDER — FERROUS SULFATE 325(65) MG
325 TABLET ORAL AT BEDTIME
Refills: 0 | Status: DISCONTINUED | OUTPATIENT
Start: 2021-09-23 | End: 2021-09-30

## 2021-09-23 RX ORDER — BUDESONIDE AND FORMOTEROL FUMARATE DIHYDRATE 160; 4.5 UG/1; UG/1
1 AEROSOL RESPIRATORY (INHALATION) ONCE
Refills: 0 | Status: DISCONTINUED | OUTPATIENT
Start: 2021-09-23 | End: 2021-09-23

## 2021-09-23 RX ORDER — ALPRAZOLAM 0.25 MG
1 TABLET ORAL
Qty: 0 | Refills: 0 | DISCHARGE

## 2021-09-23 RX ORDER — DEXTROSE 50 % IN WATER 50 %
12.5 SYRINGE (ML) INTRAVENOUS ONCE
Refills: 0 | Status: DISCONTINUED | OUTPATIENT
Start: 2021-09-23 | End: 2021-09-30

## 2021-09-23 RX ORDER — GLIMEPIRIDE 1 MG
2 TABLET ORAL
Qty: 0 | Refills: 0 | DISCHARGE

## 2021-09-23 RX ORDER — ONDANSETRON 8 MG/1
1 TABLET, FILM COATED ORAL
Qty: 0 | Refills: 0 | DISCHARGE

## 2021-09-23 RX ORDER — BNT162B2 0.23 MG/2.25ML
0.3 INJECTION, SUSPENSION INTRAMUSCULAR
Qty: 0 | Refills: 0 | DISCHARGE

## 2021-09-23 RX ORDER — METFORMIN HYDROCHLORIDE 850 MG/1
1 TABLET ORAL
Qty: 0 | Refills: 0 | DISCHARGE

## 2021-09-23 RX ORDER — PANTOPRAZOLE SODIUM 20 MG/1
1 TABLET, DELAYED RELEASE ORAL
Qty: 0 | Refills: 0 | DISCHARGE

## 2021-09-23 RX ORDER — RISPERIDONE 4 MG/1
2 TABLET ORAL
Refills: 0 | Status: DISCONTINUED | OUTPATIENT
Start: 2021-09-23 | End: 2021-09-30

## 2021-09-23 RX ORDER — CITALOPRAM 10 MG/1
30 TABLET, FILM COATED ORAL
Refills: 0 | Status: DISCONTINUED | OUTPATIENT
Start: 2021-09-23 | End: 2021-09-30

## 2021-09-23 RX ORDER — FLUTICASONE FUROATE AND VILANTEROL TRIFENATATE 100; 25 UG/1; UG/1
1 POWDER RESPIRATORY (INHALATION)
Qty: 0 | Refills: 0 | DISCHARGE

## 2021-09-23 RX ORDER — CITALOPRAM 10 MG/1
1 TABLET, FILM COATED ORAL
Qty: 0 | Refills: 0 | DISCHARGE

## 2021-09-23 RX ORDER — ALBUTEROL 90 UG/1
2 AEROSOL, METERED ORAL EVERY 6 HOURS
Refills: 0 | Status: DISCONTINUED | OUTPATIENT
Start: 2021-09-23 | End: 2021-09-30

## 2021-09-23 RX ORDER — SODIUM CHLORIDE 9 MG/ML
2000 INJECTION INTRAMUSCULAR; INTRAVENOUS; SUBCUTANEOUS ONCE
Refills: 0 | Status: COMPLETED | OUTPATIENT
Start: 2021-09-23 | End: 2021-09-23

## 2021-09-23 RX ORDER — ACETAMINOPHEN 500 MG
650 TABLET ORAL EVERY 6 HOURS
Refills: 0 | Status: DISCONTINUED | OUTPATIENT
Start: 2021-09-23 | End: 2021-09-30

## 2021-09-23 RX ORDER — INSULIN GLARGINE 100 [IU]/ML
25 INJECTION, SOLUTION SUBCUTANEOUS AT BEDTIME
Refills: 0 | Status: DISCONTINUED | OUTPATIENT
Start: 2021-09-23 | End: 2021-09-25

## 2021-09-23 RX ORDER — PREGABALIN 225 MG/1
1 CAPSULE ORAL
Qty: 0 | Refills: 0 | DISCHARGE

## 2021-09-23 RX ORDER — ARIPIPRAZOLE 15 MG/1
2 TABLET ORAL
Qty: 0 | Refills: 0 | DISCHARGE

## 2021-09-23 RX ORDER — BUDESONIDE AND FORMOTEROL FUMARATE DIHYDRATE 160; 4.5 UG/1; UG/1
2 AEROSOL RESPIRATORY (INHALATION)
Refills: 0 | Status: DISCONTINUED | OUTPATIENT
Start: 2021-09-23 | End: 2021-09-30

## 2021-09-23 RX ORDER — VENLAFAXINE HCL 75 MG
2 CAPSULE, EXT RELEASE 24 HR ORAL
Qty: 0 | Refills: 0 | DISCHARGE

## 2021-09-23 RX ORDER — FERROUS SULFATE 325(65) MG
1 TABLET ORAL
Qty: 0 | Refills: 0 | DISCHARGE

## 2021-09-23 RX ADMIN — Medication 100 MILLIEQUIVALENT(S): at 17:55

## 2021-09-23 RX ADMIN — Medication 50 GRAM(S): at 17:37

## 2021-09-23 RX ADMIN — Medication 100 MILLIEQUIVALENT(S): at 20:12

## 2021-09-23 RX ADMIN — DEXTROSE MONOHYDRATE, SODIUM CHLORIDE, AND POTASSIUM CHLORIDE 75 MILLILITER(S): 50; .745; 4.5 INJECTION, SOLUTION INTRAVENOUS at 23:24

## 2021-09-23 RX ADMIN — INSULIN GLARGINE 25 UNIT(S): 100 INJECTION, SOLUTION SUBCUTANEOUS at 17:41

## 2021-09-23 RX ADMIN — INSULIN HUMAN 25 UNIT(S): 100 INJECTION, SOLUTION SUBCUTANEOUS at 17:41

## 2021-09-23 RX ADMIN — Medication 2 GRAM(S): at 18:37

## 2021-09-23 RX ADMIN — Medication 100 MILLIEQUIVALENT(S): at 17:05

## 2021-09-23 RX ADMIN — Medication 325 MILLIGRAM(S): at 23:26

## 2021-09-23 RX ADMIN — CITALOPRAM 30 MILLIGRAM(S): 10 TABLET, FILM COATED ORAL at 23:26

## 2021-09-23 RX ADMIN — Medication 40 MILLIEQUIVALENT(S): at 17:06

## 2021-09-23 RX ADMIN — HEPARIN SODIUM 5000 UNIT(S): 5000 INJECTION INTRAVENOUS; SUBCUTANEOUS at 23:26

## 2021-09-23 RX ADMIN — SODIUM CHLORIDE 4000 MILLILITER(S): 9 INJECTION INTRAMUSCULAR; INTRAVENOUS; SUBCUTANEOUS at 15:50

## 2021-09-23 NOTE — ED PROVIDER NOTE - NS ED ROS FT
Review of Systems:  	•	CONSTITUTIONAL: no fever, hyperglycemia  	•	SKIN: no rash  	•	RESPIRATORY: shortness of breath  	•	CARDIAC: no chest pain  	•	GI:  no abd pain, no nausea, no vomiting, no diarrhea  	•	GENITO-URINARY:  no dysuria  	•	MUSCULOSKELETAL:  no back pain  	•	NEUROLOGIC: no weakness  	•	ALLERGY: no rhinorrhea  	•	PSYSCHIATRIC: appropriate concern about symptoms

## 2021-09-23 NOTE — ED ADULT NURSE REASSESSMENT NOTE - NS ED NURSE REASSESS COMMENT FT1
received report from RN R. Dunphy at 2200.  patient resting comfortably in stretcher in lowest locked position, cardiac monitoring in place - NSR 80s.  other VSS.  stat labs drawn.  repeat , Dr. Goss aware.  will place orders and contact ICU for possible 1 hour checks.  will continue plan of care.

## 2021-09-23 NOTE — H&P ADULT - NSHPLABSRESULTS_GEN_ALL_CORE
LABS:  cret                        11.0   9.99  )-----------( 266      ( 23 Sep 2021 15:48 )             34.2     09-23    130<L>  |  90<L>  |  32<H>  ----------------------------<  663<HH>  1.7<LL>   |  30  |  1.98<H>    Ca    7.7<L>      23 Sep 2021 15:48  Phos  2.7     09-23  Mg     1.3     09-23    TPro  7.1  /  Alb  3.4  /  TBili  0.5  /  DBili  x   /  AST  10<L>  /  ALT  22  /  AlkPhos  86  09-23    PT/INR - ( 23 Sep 2021 15:48 )   PT: 11.7 sec;   INR: 1.01 ratio         PTT - ( 23 Sep 2021 15:48 )  PTT:26.3 sec

## 2021-09-23 NOTE — H&P ADULT - HISTORY OF PRESENT ILLNESS
60 yo obese F with PMHx significant for COPD (dependent on 4L O2 via NC at home), pulmonary HTN, IDDM, COLLEEN on CPAP, and HTN BIBEMS from pulmonologist office due to glucose over 600 despite taking insulin.   Pt denies chest pain, fever, chills. nausea, vomting, diarrhrea, abd pain, dysuria.     In the ED, pt glucose >600, K+ 1.7, Mg 1.3, Cr. 1.98,, Ca+ 7.7, Cl- 90, small acetone, PH of 7.33, trace amount of ketone in the urine.  Patient given 25 units of lantus, 25 units of humulin, mag and potassium repleted. She was bolused 2L NS. Case discussed with ICU ( Dr. Reilly) who feels patient does not need ICU care at this time. Admit to tele unit instead.  60 yo obese F with PMHx significant for COPD (dependent on 4L O2 via NC at home), pulmonary HTN, IDDM, COLLEEN on CPAP, and HTN BIBEMS from pulmonologist office due to glucose over 600 despite taking insulin.  Patient currently taking metformin 1000mg, proglitazone 45 mg daily and novolin 120 units BID before meals. Patient is adherent to medication regimen. Patients states that for the past month her glucose levels have been similar to the  glucose on admission and has been experiencing generalized weakness, lethargy, unbalance, xerostomia, polydipsia, polyuria and intermittent palpitations. Patient has not been sick recently, no sick contacts and denies alcohol/smoking/recreational drugs.   Pt denies chest pain, fever, chills. nausea, vomting, diarrhrea, abd pain, dysuria, polyphagia.     In the ED, pt glucose >600, K+ 1.7, Mg 1.3, Cr. 1.98,, Ca+ 7.7, Cl- 90, small acetone, PH of 7.33, trace amount of ketone in the urine.  Patient given 25 units of lantus, 25 units of humulin, mag and potassium repleted. She was bolused 2L NS. Case discussed with ICU ( Dr. Reilly) who feels patient does not need ICU care at this time. Admit to tele unit instead.

## 2021-09-23 NOTE — H&P ADULT - ASSESSMENT
60 yo F as described above presenting with hyperglycemia and abnormal electrolytes concerning for DKA    #Hyperglycemia 2/2  DKA  - Glyuc >600 oadmission  - Patient a&ox4, No anion gap with PH of 7.3.   -c/w insulin drip and titrate per DKA protocol   - FSG q1hr with BMP, Mg, Phos, vbg q4h. Trend lactate and BHB to clearance   - Gluc 382 on admission  - Off of oral anti DM meds for several days due to poor PO intake  - Hold oral anti-DM meds while inpatient  - Lantus 20 U qHS  - ISS  - BGMs  - Carb consistent diet  - F/U AM HgbA1c. Titrate above regimen accordingly      #ADINA  #Dehydration  - Prerenal azotemia   - Severe dehydration in setting hyperglycemia  - Cr 1.98 on admission (baseline 0.8-1.1)  - IV hydration  - Encourage increased PO intake  - Avoid nephrotoxic agents  - Trend Cr    #Hyponatremia  - Hypovolemic hyponatremia  - Na 130 on admission, asymptomatic  - Likely due to dehydration in setting of elevated glucose (600)  - IVF with NS  - Tighter glucose control  - Trend Na    #Normocytic anemia  - Chronic, possibly related to hx of gastric bypass surgery (2007)  - On iron and B12 supplementation  - Last colonoscopy 2019, normal as per patient    #HTN  - Normotensive  - Hold losartan for now in light of ADINA  - Monitor BP    #COLLEEN  - On CPAP at bedtime    #COPD  - stable  - Supplemental O2  - Continue inhalers  - Duonebs prn    #Pulmonary HTN  - moderate pulm HTN noted on ECHO (Nov 2018)  - Likely related to underlying COPD, COLLEEN    #Bipolar disorder  - Stable   - Cont home meds    #DVT PPx  - Heparin SQ    #Advanced Directives  - Discussed with patient at bedside  - FULL CODE  - HCP : Daughter Ronit   60 yo F as described above presenting with hyperglycemia and abnormal electrolytes concerning for DKA    #Hyperglycemia 2/2 mild DKA   - Glyuc >600 on admission down to 424 s/p 25 units lantus and 25 units humulin  - Patient a&ox4, No anion gap with PH of 7.3.   - Hold oral anti-DM meds while inpatient  - Lantus 25 U qHS  - ISS  - BGMs  - Carb consistent diet  - F/U AM HgbA1c. Titrate above regimen accordingly      #ADINA  #Dehydration  - Prerenal azotemia   - Severe dehydration in setting hyperglycemia  - Cr 1.98 on admission (baseline 0.8-1.1)  - IV hydration  - Encourage increased PO intake  - Avoid nephrotoxic agents  - Trend Cr    #Hyponatremia  - Hypovolemic hyponatremia  - Na 130 on admission, asymptomatic  - Likely due to dehydration in setting of elevated glucose (600)  - IVF with NS  - Tighter glucose control  - Trend Na    #Normocytic anemia  - Chronic, possibly related to hx of gastric bypass surgery (2007)  - On iron and B12 supplementation  - Last colonoscopy 2019, normal as per patient    #HTN  - Normotensive  - Hold losartan for now in light of ADINA  - Monitor BP    #COLLEEN  - On CPAP at bedtime    #COPD  - stable  - Supplemental O2  - Continue inhalers  - Duonebs prn    #Pulmonary HTN  - moderate pulm HTN noted on ECHO (Nov 2018)  - Likely related to underlying COPD, COLLEEN    #Bipolar disorder  - Stable   - Cont home meds    #DVT PPx  - Heparin SQ    #Advanced Directives  - Discussed with patient at bedside  - FULL CODE  - HCP : Daughter Ronit   58 yo F as described above presenting with hyperglycemia and abnormal electrolytes concerning for DKA    #mild DKA   - Glyuc >600 on admission down to 424 s/p 25 units lantus and 25 units humulin  - Patient a&ox4, No anion gap with PH of 7.3.   - Hold oral anti-DM meds while inpatient  - Lantus 25 U qHS  - ISS  - BGMs  - Carb consistent diet  - F/U AM HgbA1c. Titrate above regimen accordingly    #ADINA  #Dehydration  - Prerenal azotemia   - Severe dehydration in setting hyperglycemia  - Cr 1.98 on admission (baseline 0.8-1.1)  - IV hydration  - Encourage increased PO intake  - Avoid nephrotoxic agents  - Trend Cr    #Hyponatremia  - Hypovolemic hyponatremia  - Na 130 on admission, asymptomatic  - Likely due to dehydration in setting of elevated glucose (600)  - IVF with NS  - Tighter glucose control  - Trend Na    #Normocytic anemia  - Chronic, possibly related to hx of gastric bypass surgery (2007)  - On iron and B12 supplementation  - Last colonoscopy 2019, normal as per patient    #HTN  - Normotensive  - Hold losartan for now in light of ADINA  - Hold HCTZ due to electrolyte abnormalities  - Monitor BP    #COLLEEN  - On CPAP at bedtime    #COPD  - stable  - Supplemental O2  - Continue inhalers  - Duonebs prn    #Pulmonary HTN  - moderate pulm HTN noted on ECHO (Nov 2018)  - Likely related to underlying COPD, COLLEEN    #DVT PPx  - Heparin SQ    #Advanced Directives  - Discussed with patient at bedside  - FULL CODE  - HCP : Daughter Ronit    d/w Dr. Murillo   58 yo F as described above presenting with hyperglycemia and abnormal electrolytes concerning for DKA    #Early HHS  - Glucise >600 on admission down to 424 s/p 25 units lantus and 25 units humulin  - Patient a&ox4, No anion gap with PH of 7.3.   - Hold oral anti-DM meds while inpatient  - Lantus 25 U qHS  - ISS  - BGMs  - NPO  - F/U AM HgbA1c. Titrate above regimen accordingly    #ADINA  #Dehydration  - Prerenal azotemia   - Severe dehydration in setting hyperglycemia  - Cr 1.98 on admission (baseline 0.8-1.1)  - IV hydration  - Encourage increased PO intake  - Avoid nephrotoxic agents  - Trend Cr    #Hyperglycemic Hyponatremia  - corrected Na 139 on admission, asymptomatic  - Likely due to dehydration in setting of elevated glucose (600)  - IVF with NS  - Tighter glucose control  - Trend Na    #Normocytic anemia  - Chronic, possibly related to hx of gastric bypass surgery (2007)  - On iron and B12 supplementation  - Last colonoscopy 2019, normal as per patient    #HTN  - Normotensive  - Hold losartan for now in light of ADINA  - Hold HCTZ due to electrolyte abnormalities  - Monitor BP    #COLLEEN  - On CPAP at bedtime    #COPD  - stable  - Supplemental O2  - Continue inhalers  - Duonebs prn    #Pulmonary HTN  - moderate pulm HTN noted on ECHO (Nov 2018)  - Likely related to underlying COPD, COLLEEN    #DVT PPx  - Heparin SQ    #Advanced Directives  - Discussed with patient at bedside  - FULL CODE  - HCP : Daughter Ronit    d/w Dr. Murillo   58 yo F as described above presenting with hyperglycemia and abnormal electrolytes concerning for DKA    #Early HHS  - Glucise >600 on admission down to 424 s/p 25 units lantus and 25 units humulin  - In ED patient given insulin despite critical potassium of 1.7. Will need to correct potassium before any other insulin is given.   - Patient a&ox4, No anion gap with PH of 7.3.   - Hold oral anti-DM meds while inpatient  - Lantus 25 U qHS- hold for now  - ISS  - BGMs  - NPO  - F/U AM HgbA1c. Titrate above regimen accordingly    #ADINA  #Dehydration  - Prerenal azotemia   - Severe dehydration in setting hyperglycemia  - Cr 1.98 on admission (baseline 0.8-1.1)  - IV hydration  - Encourage increased PO intake  - Avoid nephrotoxic agents  - Trend Cr    #Hyperglycemic Hyponatremia  - corrected Na 139 on admission, asymptomatic  - Likely due to dehydration in setting of elevated glucose (600)  - IVF with NS  - Tighter glucose control  - Trend Na    #Normocytic anemia  - Chronic, possibly related to hx of gastric bypass surgery (2007)  - On iron and B12 supplementation  - Last colonoscopy 2019, normal as per patient    #HTN  - Normotensive  - Hold losartan for now in light of ADINA  - Hold HCTZ due to electrolyte abnormalities  - Monitor BP    #COLLEEN  - On CPAP at bedtime    #COPD  - stable  - Supplemental O2  - Continue inhalers  - Duonebs prn    #Pulmonary HTN  - moderate pulm HTN noted on ECHO (Nov 2018)  - Likely related to underlying COPD, COLLEEN    #DVT PPx  - Heparin SQ    #Advanced Directives  - Discussed with patient at bedside  - FULL CODE  - HCP : Daughter Ronit    d/w Dr. Murlilo   60 yo F as described above presenting with hyperglycemia and abnormal electrolytes concerning for DKA    #Early HHS  - Glucose >600 on admission down to 128 s/p 25 units lantus and 25 units humulin in ED. Unclear why such high dose of insulin was given despite severe potassium of 1.7 and risk of hypoglycemia.   - Will need to correct potassium before any other insulin is given.  - BGM every 1-2 hours   - Patient a&ox4, No anion gap with PH of 7.3.   - Hold oral anti-DM meds while inpatient  - NPO  - F/U AM HgbA1c. Titrate above regimen accordingly    # Severe  hyperkalemia  - on admission 1.7  - s/p 40mg potassium chloride powder and 10mg IV potassium chloride IV x3 for total of 70mg.   - Repeat BMP at 12AM, 3 AM and 6AM    #ADINA  #Dehydration  - Prerenal azotemia   - Severe dehydration in setting hyperglycemia  - Cr 1.98 on admission (baseline 0.8-1.1)  - IV hydration  - Encourage increased PO intake  - Avoid nephrotoxic agents  - Trend Cr    #Hyperglycemic Hyponatremia  - corrected Na 139 on admission  - Trend Na    #Normocytic anemia  - Chronic, possibly related to hx of gastric bypass surgery (2007)  - On iron and B12 supplementation  - Last colonoscopy 2019, normal as per patient    #HTN  - Normotensive  - Hold losartan for now in light of ADINA  - Hold HCTZ due to electrolyte abnormalities  - Monitor BP    #COLLEEN  - On CPAP at bedtime    #COPD  - stable  - Supplemental O2  - Continue inhalers  - Duonebs prn    #Pulmonary HTN  - moderate pulm HTN noted on ECHO (Nov 2018)  - Likely related to underlying COPD, COLLEEN    #DVT PPx  - Heparin SQ    #Advanced Directives  - Discussed with patient at bedside  - FULL CODE  - HCP : Daughter Ronit    d/w Dr. Murillo   60 yo F as described above presenting with hyperglycemia and abnormal electrolytes concerning for DKA    #Early HHS  - Glucose >600 on admission down to 128 s/p 25 units lantus and 25 units humulin in ED. Unclear why such high dose of insulin was given despite severe potassium of 1.7 and risk of hypoglycemia.   - Will need to correct potassium before any other insulin is given.  - Started dextrose 5% + NaCl 0.45% w/ KCl 20 meq 1L infuse at 75cc/hr   - BGM every 1-2 hours   - Patient a&ox4, No anion gap with PH of 7.3.   - Hold oral anti-DM meds while inpatient  - NPO  - F/U AM HgbA1c. Titrate above regimen accordingly    # Severe  hyperkalemia  - on admission 1.7  - s/p 40mg potassium chloride powder and 10mg IV potassium chloride IV x3 for total of 70mg.   - Repeat BMP at 12AM, 3 AM and 6AM    #ADINA  #Dehydration  - Prerenal azotemia   - Severe dehydration in setting hyperglycemia  - Cr 1.98 on admission (baseline 0.8-1.1)  - IV hydration  - Encourage increased PO intake  - Avoid nephrotoxic agents  - Trend Cr    #Hyperglycemic Hyponatremia  - corrected Na 139 on admission  - Trend Na    #Normocytic anemia  - Chronic, possibly related to hx of gastric bypass surgery (2007)  - On iron and B12 supplementation  - Last colonoscopy 2019, normal as per patient    #HTN  - Normotensive  - Hold losartan for now in light of ADINA  - Hold HCTZ due to electrolyte abnormalities  - Monitor BP    #COLLEEN  - On CPAP at bedtime    #COPD  - stable  - Supplemental O2  - Continue inhalers  - Duonebs prn    #Pulmonary HTN  - moderate pulm HTN noted on ECHO (Nov 2018)  - Likely related to underlying COPD, COLLEEN    #DVT PPx  - Heparin SQ    #Advanced Directives  - Discussed with patient at bedside  - FULL CODE  - HCP : Daughter Ronit    d/w Dr. Murillo   58 yo F as described above presenting with hyperglycemia and abnormal electrolytes concerning for DKA    #Early HHS  - Glucose >600 on admission down to 128 s/p 25 units lantus and 25 units humulin in ED. Unclear why such high dose of insulin was given despite severe potassium of 1.7 and risk of hypoglycemia.   - Will need to correct potassium before any other insulin is given.  - Started dextrose 5% + NaCl 0.45% w/ KCl 20 meq 1L infuse at 75cc/hr   - BGM every 1-2 hours   - Patient a&ox4, No anion gap with PH of 7.3.   - Hold oral anti-DM meds while inpatient  - NPO  - F/U AM HgbA1c. Titrate above regimen accordingly    # Severe Hypokalemia  - on admission 1.7  - s/p 40mg potassium chloride powder and 10mg IV potassium chloride IV x3 for total of 70mg.   - Repeat BMP at 12AM, 3 AM and 6AM    #ADINA  #Dehydration  - Prerenal azotemia   - Severe dehydration in setting hyperglycemia  - Cr 1.98 on admission (baseline 0.8-1.1)  - IV hydration  - Encourage increased PO intake  - Avoid nephrotoxic agents  - Trend Cr    #Hyperglycemic Hyponatremia  - corrected Na 139 on admission  - Trend Na    #Normocytic anemia  - Chronic, possibly related to hx of gastric bypass surgery (2007)  - On iron and B12 supplementation  - Last colonoscopy 2019, normal as per patient    #HTN  - Normotensive  - Hold losartan for now in light of ADINA  - Hold HCTZ due to electrolyte abnormalities  - Monitor BP    #COLLEEN  - On CPAP at bedtime    #COPD  - stable  - Supplemental O2  - Continue inhalers  - Duonebs prn    #Pulmonary HTN  - moderate pulm HTN noted on ECHO (Nov 2018)  - Likely related to underlying COPD, COLLEEN    #DVT PPx  - Heparin SQ    #Advanced Directives  - Discussed with patient at bedside  - FULL CODE  - HCP : Daughter Ronit    d/w Dr. Murillo

## 2021-09-23 NOTE — ED PROVIDER NOTE - PHYSICAL EXAMINATION
*GEN: No acute distress, well appearing , obese  *HEAD: Normocephalic, Atraumatic  *EYES/NOSE: b/l Pupils symmetric & Reactive to ligth, EOMI b/l  *THROAT: airway patent, moist mucous membranes  *NECK: Neck supple  *PULMONARY: No Respiratory distress, symmetric b/l chest rise  *CARDIAC: s1s2, regular rhythm   *ABDOMEN:  Non Tender, Non Distended, soft, no guarding, no rebound, no masses   *BACK: no CVA tenderness, No midline vertebral tenderness to palpation   *EXTREMITIES: symmetric pulses, 2+ DP & radial pulses, no cyanosis, no edema   *SKIN: no rash, no bruising   *NEUROLOGIC: alert,  full active & passive ROM in all 4 extremities,   *PSYCH: appropriate concern about symptoms, pleasant

## 2021-09-23 NOTE — ED ADULT TRIAGE NOTE - CHIEF COMPLAINT QUOTE
pt presents to ed via ems from [facility] pulmonologist/ endocrinologist office for evaluation of elevated BGM in the 600s. pt reports elevated BGM x more than 2 weeks and her drs have been altering her insulin regiment. pt has hx of copd 4L nasal canula baseline. no other complaints pt presents to ed via ems from [facility] pulmonologist/ endocrinologist office for evaluation of elevated BGM in the 600s. pt reports elevated BGM x more than 2 weeks and her drs have been altering her insulin regiment. pt has hx of copd 4L nasal canula baseline. no other complaints. bgm in triage was "HI"

## 2021-09-23 NOTE — H&P ADULT - NSHPREVIEWOFSYSTEMS_GEN_ALL_CORE

## 2021-09-23 NOTE — ED PROVIDER NOTE - PROGRESS NOTE DETAILS
zach: discussed with ICU Dr Reilly, doesn't need icu or step down, rec's tele adm & isnulin zach: Discussed need to admit with patient & discussed risk and benefits.  Patient agreed to admission.  Discussed case w/ admitting doctor - agreed to admit to their service. will place bridge orders. Accepting physician said:    DO NOT MOVE prior to inpatient team evaluation

## 2021-09-23 NOTE — H&P ADULT - ATTENDING COMMENTS
60 y/o F 60 y/o F PMHx significant for COPD (dependent on 4L O2 via NC at home), pulmonary HTN, IDDM, COLLEEN on CPAP, and HTN BIBA from her pulmonologist's office for further evaluation and management severe hyperglycemia; found to have a glucose of over 600 despite taking her insulin.  #Early HHS  ~admit to CICU (given severe hypokalemia, prolonged qTC, overcorrection by the ED now requiring frequent FS)   ~as noted above patient was given 25 units of Insulin Glargine + 25 units of Humulin N by the ED. As above I am concerned and unclear why such a high dose of insulin was given despite this patient's initial severe potassium of 1.7    ~as above please correct potassium/magnesium/phosphorus before any other insulin is given.  ~patient started on dextrose 5% + NaCl 0.45% w/ KCl 20 meq 1L infuse at 75cc/hr as patient's glucose went down to 128mg/dL.    ~for now recommend BGM every 1-2 hours   ~labs overall revealed no anion gap with pH of 7.3.   ~agree with holding this patient 's home meds  NPO for now  ~f/u AM HgbA1c    # Severe Hypokalemia  ~as above on admission K was 1.7  ~s/p 40mg potassium chloride powder and 10mg IV potassium chloride IV x3 for total of 70mg x 2 runs  ~f/u repeat labs    #ADINA  #Dehydration  ~pre-renal azotemia   ~severe dehydration in setting hyperglycemia  ~f/u am labs     #Hyperglycemic Hyponatremia  ~as above patient's corrected Na is 139     #Normocytic anemia  ~as above cont. Fe and vitamin B12 supplementation as anemia likely chronic as patient with hx of gastric bypass surgery    #HTN  ~as above will hold Losartan + HCTZ for now in light of ADINA    #COLLEEN  ~cont. CPCP qhs    #COPD  ~cont. supplemental O2  ~cont. inhalers as ordered above     #Vte ppx  ~cont. Heparin sq

## 2021-09-23 NOTE — ED ADULT NURSE NOTE - CHIEF COMPLAINT QUOTE
pt presents to ed via ems from [facility] pulmonologist/ endocrinologist office for evaluation of elevated BGM in the 600s. pt reports elevated BGM x more than 2 weeks and her drs have been altering her insulin regiment. pt has hx of copd 4L nasal canula baseline. no other complaints. bgm in triage was "HI"

## 2021-09-23 NOTE — ED PROVIDER NOTE - CARE PLAN
Principal Discharge DX:	Hyperglycemia  Secondary Diagnosis:	Hypokalemia  Secondary Diagnosis:	Hypomagnesemia   1

## 2021-09-23 NOTE — ED ADULT NURSE NOTE - OBJECTIVE STATEMENT
Pt presents to ED for hyperglycemia. Pt reports she went to see Dr. Mora and was told to come to ED for BGM in the 600s. Pt reports she noticed shes been urinating more frequent than usual and her COPD has worsened. Pt denies any cough or fevers. On home o2 at 4L. Pt has known hx of diabetes and reports recent changes to DM medication. Pt denies any other complaints at this time.

## 2021-09-23 NOTE — H&P ADULT - NSHPPHYSICALEXAM_GEN_ALL_CORE
Vitals  T(F): 97.8 (09-23-21 @ 15:19), Max: 97.8 (09-23-21 @ 15:19)  HR: 81 (09-23-21 @ 15:19) (81 - 81)  BP: 111/60 (09-23-21 @ 15:19) (111/60 - 111/60)  RR: 19 (09-23-21 @ 15:19) (19 - 19)  SpO2: 95% (09-23-21 @ 15:19) (95% - 95%)    Physical Exam   Gen: NAD, comfortable  HENT: atraumatic head and ears, no gross abnormalities of ears, mucous membranes moist, no oral lesions, neck supple without masses/goiter/lymphadenopathy  CV: RRR, nl s1/s2, no M/R/G  Pulm: nl respiratory effort, CTAB, no wheezes/crackles/rhonchi  Back: no scoliosis, lordosis, or kyphosis, no tenderness  Abd: normoactive bowel sounds in all 4 quadrants, soft, nontender, nondistended, no rebound, no guarding, no masses  Extremities: no pedal edema, pedal pulses palpable   Skin: nl warm and dry, no wounds   Neuro: A&Ox3, answering questions appropriately, PERRL, EOMI, face symmetric, sensation equal bilaterally in face, tongue midline, no dysarthria, 5/5 strength in upper and lower extremities bilaterally, sensation intact in upper and lower extremities bilaterally, nl finger to nose, and nl heel to shin   Pysch: no depression, no SI, no HI Vitals  T(F): 97.8 (09-23-21 @ 15:19), Max: 97.8 (09-23-21 @ 15:19)  HR: 81 (09-23-21 @ 15:19) (81 - 81)  BP: 111/60 (09-23-21 @ 15:19) (111/60 - 111/60)  RR: 19 (09-23-21 @ 15:19) (19 - 19)  SpO2: 95% (09-23-21 @ 15:19) (95% - 95%)    Physical Exam   Gen: NAD, comfortable, lehthargic  HENT: atraumatic head and ears, no gross abnormalities of ears, mucous membranes moist, no oral lesions, neck supple without masses/goiter/lymphadenopathy  CV: RRR, nl s1/s2, no M/R/G  Pulm: nl respiratory effort, CTAB, no wheezes/crackles/rhonchi  Back: no scoliosis, lordosis, or kyphosis, no tenderness  Abd: normoactive bowel sounds in all 4 quadrants, soft, nontender, nondistended, no rebound, no guarding, no masses  Extremities: no pedal edema, pedal pulses palpable   Skin: nl warm and dry, no wounds   Neuro: A&Ox3, answering questions appropriately, PERRL, EOMI, decreased sensation on Lower extremities below mid tibia.   Pysch: no depression, no SI, no HI Vitals  T(F): 97.8 (09-23-21 @ 15:19), Max: 97.8 (09-23-21 @ 15:19)  HR: 81 (09-23-21 @ 15:19) (81 - 81)  BP: 111/60 (09-23-21 @ 15:19) (111/60 - 111/60)  RR: 19 (09-23-21 @ 15:19) (19 - 19)  SpO2: 95% (09-23-21 @ 15:19) (95% - 95%)    Physical Exam   Gen: NAD, comfortable, lethargic  HEENT: atraumatic head and ears, no gross abnormalities of ears, mucous membranes moist, no oral lesions, neck supple without masses/goiter/lymphadenopathy  CV: RRR, nl s1/s2, no M/R/G  Pulm: nl respiratory effort, CTAB, no wheezes/crackles/rhonchi  Back: no scoliosis, lordosis, or kyphosis, no tenderness  Abd: normoactive bowel sounds in all 4 quadrants, soft, nontender, nondistended, no rebound, no guarding, no masses  Extremities: no pedal edema, pedal pulses palpable   Skin: nl warm and dry, no wounds   Neuro: A&Ox3, answering questions appropriately, PERRL, EOMI, decreased sensation on Lower extremities below mid tibia.   Psych: no depression, no SI, no HI

## 2021-09-24 LAB
A1C WITH ESTIMATED AVERAGE GLUCOSE RESULT: 11 % — HIGH (ref 4–5.6)
ALBUMIN SERPL ELPH-MCNC: 2.8 G/DL — LOW (ref 3.3–5)
ALP SERPL-CCNC: 65 U/L — SIGNIFICANT CHANGE UP (ref 40–120)
ALT FLD-CCNC: 19 U/L — SIGNIFICANT CHANGE UP (ref 12–78)
ANION GAP SERPL CALC-SCNC: 4 MMOL/L — LOW (ref 5–17)
ANION GAP SERPL CALC-SCNC: 5 MMOL/L — SIGNIFICANT CHANGE UP (ref 5–17)
ANION GAP SERPL CALC-SCNC: 5 MMOL/L — SIGNIFICANT CHANGE UP (ref 5–17)
ANION GAP SERPL CALC-SCNC: 6 MMOL/L — SIGNIFICANT CHANGE UP (ref 5–17)
ANION GAP SERPL CALC-SCNC: 6 MMOL/L — SIGNIFICANT CHANGE UP (ref 5–17)
AST SERPL-CCNC: 11 U/L — LOW (ref 15–37)
BILIRUB SERPL-MCNC: 0.4 MG/DL — SIGNIFICANT CHANGE UP (ref 0.2–1.2)
BUN SERPL-MCNC: 14 MG/DL — SIGNIFICANT CHANGE UP (ref 7–23)
BUN SERPL-MCNC: 16 MG/DL — SIGNIFICANT CHANGE UP (ref 7–23)
BUN SERPL-MCNC: 21 MG/DL — SIGNIFICANT CHANGE UP (ref 7–23)
BUN SERPL-MCNC: 25 MG/DL — HIGH (ref 7–23)
BUN SERPL-MCNC: 28 MG/DL — HIGH (ref 7–23)
CALCIUM SERPL-MCNC: 7 MG/DL — LOW (ref 8.5–10.1)
CALCIUM SERPL-MCNC: 7.2 MG/DL — LOW (ref 8.5–10.1)
CALCIUM SERPL-MCNC: 7.3 MG/DL — LOW (ref 8.5–10.1)
CHLORIDE SERPL-SCNC: 102 MMOL/L — SIGNIFICANT CHANGE UP (ref 96–108)
CHLORIDE SERPL-SCNC: 103 MMOL/L — SIGNIFICANT CHANGE UP (ref 96–108)
CHLORIDE SERPL-SCNC: 103 MMOL/L — SIGNIFICANT CHANGE UP (ref 96–108)
CHLORIDE SERPL-SCNC: 104 MMOL/L — SIGNIFICANT CHANGE UP (ref 96–108)
CHLORIDE SERPL-SCNC: 104 MMOL/L — SIGNIFICANT CHANGE UP (ref 96–108)
CO2 SERPL-SCNC: 28 MMOL/L — SIGNIFICANT CHANGE UP (ref 22–31)
CO2 SERPL-SCNC: 28 MMOL/L — SIGNIFICANT CHANGE UP (ref 22–31)
CO2 SERPL-SCNC: 29 MMOL/L — SIGNIFICANT CHANGE UP (ref 22–31)
CO2 SERPL-SCNC: 30 MMOL/L — SIGNIFICANT CHANGE UP (ref 22–31)
CO2 SERPL-SCNC: 32 MMOL/L — HIGH (ref 22–31)
COVID-19 SPIKE DOMAIN AB INTERP: POSITIVE
COVID-19 SPIKE DOMAIN ANTIBODY RESULT: >250 U/ML — HIGH
CREAT SERPL-MCNC: 1.17 MG/DL — SIGNIFICANT CHANGE UP (ref 0.5–1.3)
CREAT SERPL-MCNC: 1.23 MG/DL — SIGNIFICANT CHANGE UP (ref 0.5–1.3)
CREAT SERPL-MCNC: 1.24 MG/DL — SIGNIFICANT CHANGE UP (ref 0.5–1.3)
CREAT SERPL-MCNC: 1.25 MG/DL — SIGNIFICANT CHANGE UP (ref 0.5–1.3)
CREAT SERPL-MCNC: 1.27 MG/DL — SIGNIFICANT CHANGE UP (ref 0.5–1.3)
ESTIMATED AVERAGE GLUCOSE: 269 MG/DL — HIGH (ref 68–114)
GLUCOSE SERPL-MCNC: 157 MG/DL — HIGH (ref 70–99)
GLUCOSE SERPL-MCNC: 172 MG/DL — HIGH (ref 70–99)
GLUCOSE SERPL-MCNC: 315 MG/DL — HIGH (ref 70–99)
GLUCOSE SERPL-MCNC: 332 MG/DL — HIGH (ref 70–99)
GLUCOSE SERPL-MCNC: 340 MG/DL — HIGH (ref 70–99)
HCT VFR BLD CALC: 31.4 % — LOW (ref 34.5–45)
HGB BLD-MCNC: 9.9 G/DL — LOW (ref 11.5–15.5)
MAGNESIUM SERPL-MCNC: 1.6 MG/DL — SIGNIFICANT CHANGE UP (ref 1.6–2.6)
MAGNESIUM SERPL-MCNC: 1.7 MG/DL — SIGNIFICANT CHANGE UP (ref 1.6–2.6)
MAGNESIUM SERPL-MCNC: 1.9 MG/DL — SIGNIFICANT CHANGE UP (ref 1.6–2.6)
MCHC RBC-ENTMCNC: 26.3 PG — LOW (ref 27–34)
MCHC RBC-ENTMCNC: 31.5 GM/DL — LOW (ref 32–36)
MCV RBC AUTO: 83.5 FL — SIGNIFICANT CHANGE UP (ref 80–100)
PHOSPHATE SERPL-MCNC: 1.6 MG/DL — LOW (ref 2.5–4.5)
PHOSPHATE SERPL-MCNC: 1.9 MG/DL — LOW (ref 2.5–4.5)
PHOSPHATE SERPL-MCNC: 2.1 MG/DL — LOW (ref 2.5–4.5)
PLATELET # BLD AUTO: 246 K/UL — SIGNIFICANT CHANGE UP (ref 150–400)
POTASSIUM SERPL-MCNC: 2 MMOL/L — CRITICAL LOW (ref 3.5–5.3)
POTASSIUM SERPL-MCNC: 2.1 MMOL/L — CRITICAL LOW (ref 3.5–5.3)
POTASSIUM SERPL-MCNC: 2.1 MMOL/L — CRITICAL LOW (ref 3.5–5.3)
POTASSIUM SERPL-MCNC: 2.2 MMOL/L — CRITICAL LOW (ref 3.5–5.3)
POTASSIUM SERPL-MCNC: 2.2 MMOL/L — CRITICAL LOW (ref 3.5–5.3)
POTASSIUM SERPL-SCNC: 2 MMOL/L — CRITICAL LOW (ref 3.5–5.3)
POTASSIUM SERPL-SCNC: 2.1 MMOL/L — CRITICAL LOW (ref 3.5–5.3)
POTASSIUM SERPL-SCNC: 2.1 MMOL/L — CRITICAL LOW (ref 3.5–5.3)
POTASSIUM SERPL-SCNC: 2.2 MMOL/L — CRITICAL LOW (ref 3.5–5.3)
POTASSIUM SERPL-SCNC: 2.2 MMOL/L — CRITICAL LOW (ref 3.5–5.3)
PROT SERPL-MCNC: 6.1 GM/DL — SIGNIFICANT CHANGE UP (ref 6–8.3)
RBC # BLD: 3.76 M/UL — LOW (ref 3.8–5.2)
RBC # FLD: 12.1 % — SIGNIFICANT CHANGE UP (ref 10.3–14.5)
SARS-COV-2 IGG+IGM SERPL QL IA: >250 U/ML — HIGH
SARS-COV-2 IGG+IGM SERPL QL IA: POSITIVE
SODIUM SERPL-SCNC: 136 MMOL/L — SIGNIFICANT CHANGE UP (ref 135–145)
SODIUM SERPL-SCNC: 137 MMOL/L — SIGNIFICANT CHANGE UP (ref 135–145)
SODIUM SERPL-SCNC: 137 MMOL/L — SIGNIFICANT CHANGE UP (ref 135–145)
SODIUM SERPL-SCNC: 139 MMOL/L — SIGNIFICANT CHANGE UP (ref 135–145)
SODIUM SERPL-SCNC: 140 MMOL/L — SIGNIFICANT CHANGE UP (ref 135–145)
WBC # BLD: 8.13 K/UL — SIGNIFICANT CHANGE UP (ref 3.8–10.5)
WBC # FLD AUTO: 8.13 K/UL — SIGNIFICANT CHANGE UP (ref 3.8–10.5)

## 2021-09-24 PROCEDURE — 99233 SBSQ HOSP IP/OBS HIGH 50: CPT | Mod: GC

## 2021-09-24 PROCEDURE — 93010 ELECTROCARDIOGRAM REPORT: CPT

## 2021-09-24 RX ORDER — POTASSIUM CHLORIDE 20 MEQ
40 PACKET (EA) ORAL ONCE
Refills: 0 | Status: COMPLETED | OUTPATIENT
Start: 2021-09-24 | End: 2021-09-24

## 2021-09-24 RX ORDER — INFLUENZA VIRUS VACCINE 15; 15; 15; 15 UG/.5ML; UG/.5ML; UG/.5ML; UG/.5ML
0.5 SUSPENSION INTRAMUSCULAR ONCE
Refills: 0 | Status: COMPLETED | OUTPATIENT
Start: 2021-09-24 | End: 2021-09-30

## 2021-09-24 RX ORDER — SODIUM,POTASSIUM PHOSPHATES 278-250MG
1 POWDER IN PACKET (EA) ORAL ONCE
Refills: 0 | Status: COMPLETED | OUTPATIENT
Start: 2021-09-24 | End: 2021-09-24

## 2021-09-24 RX ORDER — DEXTROSE MONOHYDRATE, SODIUM CHLORIDE, AND POTASSIUM CHLORIDE 50; .745; 4.5 G/1000ML; G/1000ML; G/1000ML
1000 INJECTION, SOLUTION INTRAVENOUS
Refills: 0 | Status: DISCONTINUED | OUTPATIENT
Start: 2021-09-24 | End: 2021-09-24

## 2021-09-24 RX ORDER — MAGNESIUM SULFATE 500 MG/ML
1 VIAL (ML) INJECTION ONCE
Refills: 0 | Status: COMPLETED | OUTPATIENT
Start: 2021-09-24 | End: 2021-09-24

## 2021-09-24 RX ORDER — POTASSIUM CHLORIDE 20 MEQ
10 PACKET (EA) ORAL
Refills: 0 | Status: COMPLETED | OUTPATIENT
Start: 2021-09-24 | End: 2021-09-24

## 2021-09-24 RX ORDER — DEXTROSE 50 % IN WATER 50 %
25 SYRINGE (ML) INTRAVENOUS ONCE
Refills: 0 | Status: COMPLETED | OUTPATIENT
Start: 2021-09-24 | End: 2021-09-24

## 2021-09-24 RX ORDER — ONDANSETRON 8 MG/1
4 TABLET, FILM COATED ORAL ONCE
Refills: 0 | Status: DISCONTINUED | OUTPATIENT
Start: 2021-09-24 | End: 2021-09-24

## 2021-09-24 RX ORDER — POTASSIUM CHLORIDE 20 MEQ
40 PACKET (EA) ORAL EVERY 4 HOURS
Refills: 0 | Status: DISCONTINUED | OUTPATIENT
Start: 2021-09-24 | End: 2021-09-24

## 2021-09-24 RX ADMIN — ARIPIPRAZOLE 30 MILLIGRAM(S): 15 TABLET ORAL at 09:15

## 2021-09-24 RX ADMIN — Medication 100 MILLIEQUIVALENT(S): at 17:31

## 2021-09-24 RX ADMIN — CITALOPRAM 30 MILLIGRAM(S): 10 TABLET, FILM COATED ORAL at 21:56

## 2021-09-24 RX ADMIN — Medication 40 MILLIEQUIVALENT(S): at 00:37

## 2021-09-24 RX ADMIN — Medication 25 GRAM(S): at 00:59

## 2021-09-24 RX ADMIN — Medication 40 MILLIEQUIVALENT(S): at 09:15

## 2021-09-24 RX ADMIN — Medication 100 GRAM(S): at 05:24

## 2021-09-24 RX ADMIN — DEXTROSE MONOHYDRATE, SODIUM CHLORIDE, AND POTASSIUM CHLORIDE 75 MILLILITER(S): 50; .745; 4.5 INJECTION, SOLUTION INTRAVENOUS at 08:30

## 2021-09-24 RX ADMIN — BUDESONIDE AND FORMOTEROL FUMARATE DIHYDRATE 2 PUFF(S): 160; 4.5 AEROSOL RESPIRATORY (INHALATION) at 10:07

## 2021-09-24 RX ADMIN — Medication 8: at 17:12

## 2021-09-24 RX ADMIN — Medication 2: at 08:30

## 2021-09-24 RX ADMIN — Medication 100 MILLIEQUIVALENT(S): at 01:46

## 2021-09-24 RX ADMIN — RISPERIDONE 2 MILLIGRAM(S): 4 TABLET ORAL at 01:46

## 2021-09-24 RX ADMIN — Medication 100 MILLIEQUIVALENT(S): at 20:20

## 2021-09-24 RX ADMIN — RISPERIDONE 2 MILLIGRAM(S): 4 TABLET ORAL at 21:56

## 2021-09-24 RX ADMIN — Medication 40 MILLIEQUIVALENT(S): at 17:12

## 2021-09-24 RX ADMIN — Medication 40 MILLIEQUIVALENT(S): at 13:50

## 2021-09-24 RX ADMIN — Medication 1 PACKET(S): at 14:43

## 2021-09-24 RX ADMIN — Medication 8: at 12:15

## 2021-09-24 RX ADMIN — Medication 325 MILLIGRAM(S): at 21:56

## 2021-09-24 RX ADMIN — Medication 6: at 22:00

## 2021-09-24 RX ADMIN — BUDESONIDE AND FORMOTEROL FUMARATE DIHYDRATE 2 PUFF(S): 160; 4.5 AEROSOL RESPIRATORY (INHALATION) at 20:23

## 2021-09-24 RX ADMIN — RISPERIDONE 2 MILLIGRAM(S): 4 TABLET ORAL at 09:15

## 2021-09-24 RX ADMIN — Medication 40 MILLIEQUIVALENT(S): at 04:38

## 2021-09-24 RX ADMIN — Medication 100 MILLIEQUIVALENT(S): at 00:52

## 2021-09-24 RX ADMIN — Medication 100 MILLIEQUIVALENT(S): at 18:53

## 2021-09-24 RX ADMIN — INSULIN GLARGINE 25 UNIT(S): 100 INJECTION, SOLUTION SUBCUTANEOUS at 22:00

## 2021-09-24 RX ADMIN — PREGABALIN 500 MICROGRAM(S): 225 CAPSULE ORAL at 09:14

## 2021-09-24 RX ADMIN — CITALOPRAM 30 MILLIGRAM(S): 10 TABLET, FILM COATED ORAL at 09:15

## 2021-09-24 RX ADMIN — Medication 100 MILLIEQUIVALENT(S): at 02:52

## 2021-09-24 RX ADMIN — Medication 1 PACKET(S): at 05:25

## 2021-09-24 RX ADMIN — Medication 1 PACKET(S): at 01:46

## 2021-09-24 NOTE — PROGRESS NOTE ADULT - ASSESSMENT
Pt has been seen and examined with FP resident, resident supervised agree with a/p       Patient is a 61y old  Female who presents with a chief complaint of Hyperglycemia, Hypokalemia, Hypomagnesemia (24 Sep 2021 07:26)      HPI:  62 yo obese F with PMHx significant for COPD (dependent on 4L O2 via NC at home), pulmonary HTN, IDDM, COLLEEN on CPAP, and HTN BIBEMS from pulmonologist office due to glucose over 600 despite taking insulin.     PHYSICAL EXAM:  Vital Signs Last 24 Hrs  T(C): 36.4 (24 Sep 2021 15:42), Max: 36.7 (23 Sep 2021 22:54)  T(F): 97.6 (24 Sep 2021 15:42), Max: 98 (23 Sep 2021 22:54)  HR: 76 (24 Sep 2021 16:00) (74 - 96)  BP: 100/72 (24 Sep 2021 16:00) (92/70 - 151/56)  BP(mean): 77 (24 Sep 2021 16:00) (55 - 80)  RR: 19 (24 Sep 2021 16:00) (15 - 25)  SpO2: 100% (24 Sep 2021 16:00) (93% - 100%)  -rs-aeeb, cta  -cvs-s1s2 normal   -p/a-soft,bs+      A/P    #Aggressive replacement of potassium, monitor it closely, d/c iv fluids     #replace iv potassium     #might give some lantus

## 2021-09-24 NOTE — PROGRESS NOTE ADULT - SUBJECTIVE AND OBJECTIVE BOX
HPI:  62 yo obese F with PMHx significant for COPD (dependent on 4L O2 via NC at home), pulmonary HTN, IDDM, COLLEEN on CPAP, and HTN BIBEMS from pulmonologist office due to glucose over 600 despite taking insulin.  Patient currently taking metformin 1000mg, proglitazone 45 mg daily and novolin 120 units BID before meals. Patient is adherent to medication regimen. Patients states that for the past month her glucose levels have been similar to the  glucose on admission and has been experiencing generalized weakness, lethargy, unbalance, xerostomia, polydipsia, polyuria and intermittent palpitations. Patient has not been sick recently, no sick contacts and denies alcohol/smoking/recreational drugs.   Pt denies chest pain, fever, chills. nausea, vomting, diarrhrea, abd pain, dysuria, polyphagia.     In the ED, pt glucose >600, K+ 1.7, Mg 1.3, Cr. 1.98,, Ca+ 7.7, Cl- 90, small acetone, PH of 7.33, trace amount of ketone in the urine.  Patient given 25 units of lantus, 25 units of humulin, mag and potassium repleted. She was bolused 2L NS. Case discussed with ICU ( Dr. Reilly) who feels patient does not need ICU care at this time. Admit to tele unit instead.  (23 Sep 2021 20:14)    SUBJECTIVE:   Patient seen and evaluated at bedside.  Patient mentions she was feeling strange while walking before coming to the hospital and that resolved.  Treatment and hospital course was discussed with the patient.     REVIEW OF SYSTEMS:  CONSTITUTIONAL: No weakness, fevers or chills  EYES/ENT: No visual changes;  No vertigo or throat pain   NECK: No pain or stiffness  RESPIRATORY: No cough, wheezing, hemoptysis; No shortness of breath  CARDIOVASCULAR: No chest pain or palpitations  GASTROINTESTINAL: No abdominal or epigastric pain. No nausea, vomiting, or hematemesis; No diarrhea or constipation. No melena or hematochezia.  GENITOURINARY: No dysuria, frequency or hematuria  NEUROLOGICAL: No numbness or weakness  SKIN: No itching, burning, rashes, or lesions   All other review of systems is negative unless indicated above    Vital Signs Last 24 Hrs  T(C): 36.4 (24 Sep 2021 15:42), Max: 36.7 (23 Sep 2021 22:54)  T(F): 97.6 (24 Sep 2021 15:42), Max: 98 (23 Sep 2021 22:54)  HR: 76 (24 Sep 2021 16:00) (74 - 96)  BP: 100/72 (24 Sep 2021 16:00) (92/70 - 151/56)  BP(mean): 77 (24 Sep 2021 16:00) (55 - 80)  RR: 19 (24 Sep 2021 16:00) (15 - 25)  SpO2: 100% (24 Sep 2021 16:00) (93% - 100%)    CAPILLARY BLOOD GLUCOSE  POCT Blood Glucose.: 341 mg/dL (24 Sep 2021 16:59)  POCT Blood Glucose.: 337 mg/dL (24 Sep 2021 12:02)  POCT Blood Glucose.: 325 mg/dL (24 Sep 2021 10:08)  POCT Blood Glucose.: 195 mg/dL (24 Sep 2021 08:00)  POCT Blood Glucose.: 173 mg/dL (24 Sep 2021 06:35)  POCT Blood Glucose.: 155 mg/dL (24 Sep 2021 04:07)  POCT Blood Glucose.: 156 mg/dL (24 Sep 2021 02:10)  POCT Blood Glucose.: 97 mg/dL (24 Sep 2021 00:33)  POCT Blood Glucose.: 128 mg/dL (23 Sep 2021 22:52)  POCT Blood Glucose.: 424 mg/dL (23 Sep 2021 19:53)  POCT Blood Glucose.: >600 mg/dL (23 Sep 2021 17:40)      PHYSICAL EXAM:  Constitutional: NAD, awake and alert, well-developed  HEENT: PERR, EOMI, Normal Hearing, MMM  Neck: Soft and supple, No LAD, No JVD  Respiratory: Breath sounds are clear bilaterally, No wheezing, rales or rhonchi  Cardiovascular: S1 and S2, regular rate and rhythm, no Murmurs, gallops or rubs  Gastrointestinal: Bowel Sounds present, soft, nontender, nondistended, no guarding, no rebound  Extremities: No peripheral edema  Vascular: 2+ peripheral pulses  Neurological: A/O x 3, no focal deficits  Musculoskeletal: 5/5 strength b/l upper and lower extremities  Skin: No rashes    MEDICATIONS:  MEDICATIONS  (STANDING):  ARIPiprazole 30 milliGRAM(s) Oral daily  budesonide  80 MICROgram(s)/formoterol 4.5 MICROgram(s) Inhaler 2 Puff(s) Inhalation two times a day  citalopram 30 milliGRAM(s) Oral <User Schedule>  cyanocobalamin 500 MICROGram(s) Oral daily  dextrose 40% Gel 15 Gram(s) Oral once  dextrose 5%. 1000 milliLiter(s) (100 mL/Hr) IV Continuous <Continuous>  dextrose 5%. 1000 milliLiter(s) (50 mL/Hr) IV Continuous <Continuous>  dextrose 50% Injectable 25 Gram(s) IV Push once  dextrose 50% Injectable 12.5 Gram(s) IV Push once  dextrose 50% Injectable 25 Gram(s) IV Push once  ferrous    sulfate 325 milliGRAM(s) Oral at bedtime  glucagon  Injectable 1 milliGRAM(s) IntraMuscular once  insulin glargine Injectable (LANTUS) 25 Unit(s) SubCutaneous at bedtime  insulin lispro (ADMELOG) corrective regimen sliding scale   SubCutaneous three times a day before meals  insulin lispro (ADMELOG) corrective regimen sliding scale   SubCutaneous at bedtime  latanoprost 0.005% Ophthalmic Solution 1 Drop(s) Both EYES at bedtime  potassium chloride    Tablet ER 40 milliEquivalent(s) Oral every 4 hours  potassium chloride  10 mEq/100 mL IVPB 10 milliEquivalent(s) IV Intermittent every 1 hour  risperiDONE   Tablet 2 milliGRAM(s) Oral two times a day      LABS: All Labs Reviewed:                        9.9    8.13  )-----------( 246      ( 24 Sep 2021 07:29 )             31.4     09-24    136  |  103  |  21  ----------------------------<  315<H>  2.0<LL>   |  28  |  1.27    Ca    7.2<L>      24 Sep 2021 12:34  Phos  2.1     09-24  Mg     1.9     09-24    TPro  7.1  /  Alb  3.4  /  TBili  0.5  /  DBili  x   /  AST  10<L>  /  ALT  22  /  AlkPhos  86  09-23    PT/INR - ( 23 Sep 2021 15:48 )   PT: 11.7 sec;   INR: 1.01 ratio         PTT - ( 23 Sep 2021 15:48 )  PTT:26.3 sec  CARDIAC MARKERS ( 23 Sep 2021 15:48 )  0.039 ng/mL / x     / x     / x     / x

## 2021-09-24 NOTE — CONSULT NOTE ADULT - ASSESSMENT
1) Hyperglycemia   2) Hypokalemia  3) Translocation Hyponatremia  4) COPD  5) Pulmonary Hypertension  6) Prolonged QTc  7) COLLEEN    60 yo obese F with PMHx significant for COPD (dependent on 4L O2 via NC at home), pulmonary HTN, IDDM, COLLEEN on CPAP, and HTN BIBEMS from pulmonologist office due to glucose over 600 despite taking insulin.  Patient currently taking metformin 1000mg, proglitazone 45 mg daily and novolin 120 units BID before meals. Patient states that she is compliant but HbA1c noted to be 11.  Was supposed to be seen in the office yesterday for COPD but due to the BG being >600, was sent to  and brought in by ambulance   In the ED, pt glucose >600, K+ 1.7, Mg 1.3, Cr. 1.98,, Ca+ 7.7, Cl- 90, small acetone, PH of 7.33, trace amount of ketone in the urine.  Patient given 25 units of lantus, 25 units of humulin, mag and potassium repleted.   EKG QTc noted to be 649   Repeat Glucose is 158, K 2.1  Pulmonary wise, she has a history of COPD, pulmonary hypertension, COLLEEN  She has a history of PH, PFT in the past revealed normal spirometry with an RV/TLC of 166/124%, air trapping/hyperinflation. DLCO 58%  On Breo daily and albuterol PRN  Uses CPAP nightly [id number] (Auto set at home 5-15 cmH2O)  Pulmonary wise, status is appropriate and respiratory muscle weakness that she was experiencing likely 2/2 to electrolyte abnormalities in the setting of hyperglycemia  EKG reviewed, QTc noted to be 649  Repeat EKG, electrolytes, continue closely trending Mg/K+  Will discuss further with hospitalist

## 2021-09-24 NOTE — PROGRESS NOTE ADULT - ASSESSMENT
58 yo F as described above presenting with hyperglycemia and abnormal electrolytes concerning for DKA    #Early HHS  - Glucose >600 on admission down to 128 s/p 25 units lantus and 25 units humulin in ED. Unclear why such high dose of insulin was given despite severe potassium of 1.7 and risk of hypoglycemia.   -   - Potassium 2.0 will continue to trend.  - s/p IV fluids with supplemental 20mEq potassium  - Patient a&ox4, No anion gap with PH of 7.3.   - Hold oral anti-DM meds while inpatient  - Continue to trend BS q6h  - Diet consistent carbs.  - f/u 6PM BMP, mag and phos and replete PRN    # Severe Hypokalemia  - on admission 1.7  - Currently 2.0  - s/p 40mg potassium chloride powder and 10mg IV potassium chloride IV x3 for total of 70mg in the ED.  - s/p 40mg KCl powder x3  - Will give 10 potassium IV x3 and will continue to monitor  - f/u BMP, mag and phos    #ADINA  #Dehydration  - Prerenal azotemia   - Severe dehydration in setting hyperglycemia  - Cr 1.98 on admission improved to 1.27  - s/p IV hydration  - Encourage increased PO intake  - Avoid nephrotoxic agents  - Trend Cr    #Hyperglycemic Hyponatremia  - Na 136  - Trend Na    #Normocytic anemia  - Chronic, possibly related to hx of gastric bypass surgery (2007)  - On iron and B12 supplementation  - Last colonoscopy 2019, normal as per patient    #HTN  - Normotensive  - Hold losartan for now in light of ADINA  - Hold HCTZ due to electrolyte abnormalities  - Monitor BP    #COLLEEN  - On CPAP at bedtime    #COPD  - stable  - Supplemental O2  - Continue inhalers    #Pulmonary HTN  - moderate pulm HTN noted on ECHO (Nov 2018)  - Likely related to underlying COPD, COLLEEN    #DVT PPx  - Heparin SQ    #Advanced Directives  - FULL CODE  - HCP : Daughter Ronit    *Case discussed with Dr. Rojas

## 2021-09-24 NOTE — CONSULT NOTE ADULT - SUBJECTIVE AND OBJECTIVE BOX
Patient is a 61y old  Female who presents with a chief complaint of Hyperglycemia, Hypokalemia, Hypomagnesemia (23 Sep 2021 20:14)      HPI:  62 yo obese F with PMHx significant for COPD (dependent on 4L O2 via NC at home), pulmonary HTN, IDDM, COLLEEN on CPAP, and HTN BIBEMS from pulmonologist office due to glucose over 600 despite taking insulin.  Patient currently taking metformin 1000mg, proglitazone 45 mg daily and novolin 120 units BID before meals. Patient states that she is compliant but HbA1c noted to be 11.  Was supposed to be seen in the office yesterday for COPD but due to the BG being >600, was sent to  and brought in by ambulance   In the ED, pt glucose >600, K+ 1.7, Mg 1.3, Cr. 1.98,, Ca+ 7.7, Cl- 90, small acetone, PH of 7.33, trace amount of ketone in the urine.  Patient given 25 units of lantus, 25 units of humulin, mag and potassium repleted.   EKG QTc noted to be 649   Repeat Glucose is 158, K 2.1  Pulmonary wise, she has a history of COPD, pulmonary hypertension, COLLEEN    PAST MEDICAL & SURGICAL HISTORY:  Bipolar 1 disorder    Diabetes mellitus  Type 2    Hypertension    COLLEEN (obstructive sleep apnea)    ADHD (attention deficit hyperactivity disorder)    COPD (chronic obstructive pulmonary disease)    Anemia     delivery delivered  x2    Gastric bypass status for obesity        PREVIOUS DIAGNOSTIC TESTING:      MEDICATIONS  (STANDING):  ARIPiprazole 30 milliGRAM(s) Oral daily  budesonide  80 MICROgram(s)/formoterol 4.5 MICROgram(s) Inhaler 2 Puff(s) Inhalation two times a day  citalopram 30 milliGRAM(s) Oral <User Schedule>  cyanocobalamin 500 MICROGram(s) Oral daily  dextrose 40% Gel 15 Gram(s) Oral once  dextrose 5%. 1000 milliLiter(s) (100 mL/Hr) IV Continuous <Continuous>  dextrose 5%. 1000 milliLiter(s) (50 mL/Hr) IV Continuous <Continuous>  dextrose 50% Injectable 25 Gram(s) IV Push once  dextrose 50% Injectable 12.5 Gram(s) IV Push once  dextrose 50% Injectable 25 Gram(s) IV Push once  ferrous    sulfate 325 milliGRAM(s) Oral at bedtime  glucagon  Injectable 1 milliGRAM(s) IntraMuscular once  insulin glargine Injectable (LANTUS) 25 Unit(s) SubCutaneous at bedtime  insulin lispro (ADMELOG) corrective regimen sliding scale   SubCutaneous three times a day before meals  insulin lispro (ADMELOG) corrective regimen sliding scale   SubCutaneous at bedtime  latanoprost 0.005% Ophthalmic Solution 1 Drop(s) Both EYES at bedtime  risperiDONE   Tablet 2 milliGRAM(s) Oral two times a day  sodium chloride 0.9% with potassium chloride 20 mEq/L 1000 milliLiter(s) (75 mL/Hr) IV Continuous <Continuous>    MEDICATIONS  (PRN):  acetaminophen   Tablet .. 650 milliGRAM(s) Oral every 6 hours PRN Mild Pain (1 - 3)  ALBUTerol    90 MICROgram(s) HFA Inhaler 2 Puff(s) Inhalation every 6 hours PRN for shortness of breath and/or wheezing      FAMILY HISTORY:  Family history of leukemia  mother and two cousins, all now     Family history of heart disease  father        SOCIAL HISTORY:  ***    REVIEW OF SYSTEM:  Pertinent items are noted in HPI.  Constitutional negative for chills, fevers, sweats and weight loss  throat, and face:  negative for epistaxis, nasal congestion, sore throat and   tinnitus  Respiratory: negative for cough, dyspnea on exertion, pleuritic chest pain  and wheezing  Cardiovascular:  negative for chest pain, dyspnea and palpitations  Gastrointestinal: negative for abdominal pain, diarrhea, nausea and vomiting  Genitourinary: negative for dysuria, frequency and urinary incontinence  Skin:  negative for redness, rash, pruritus, swelling, dryness and   fissures  Hematologic/lymphatic: negative for bleeding and easy bruising  Musculoskeletal: negative for arthralgias, back pain and muscle weakness  Neurological: negative for dizziness, headaches, seizures and tremors  Behavioral/Psych:  negative for mood change, depression, suicidal attempts    Allergic/Immunologic: negative for anaphylaxis, angioedema and urticaria    Vital Signs Last 24 Hrs  T(C): 36.1 (24 Sep 2021 06:32), Max: 36.7 (23 Sep 2021 22:54)  T(F): 97 (24 Sep 2021 06:32), Max: 98 (23 Sep 2021 22:54)  HR: 75 (24 Sep 2021 06:00) (74 - 92)  BP: 103/65 (24 Sep 2021 06:00) (93/75 - 151/56)  BP(mean): 73 (24 Sep 2021 06:00) (58 - 80)  RR: 22 (24 Sep 2021 06:00) (15 - 23)  SpO2: 100% (24 Sep 2021 06:00) (95% - 100%)    I&O's Summary    PHYSICAL EXAM  General Appearance: cooperative, no acute distress,   HEENT: PERRL, conjunctiva clear, EOM's intact, non injected pharynx, no exudate, TM   normal  Neck: Supple, , no adenopathy, thyroid: not enlarged, no carotid bruit or JVD  Back: Symmetric, no  tenderness,no soft tissue tenderness  Lungs: Clear to auscultation bilateral,no adventitious breath sounds, normal   expiratory phase  Heart: Regular rate and rhythm, S1, S2 normal, no murmur, rub or gallop  Abdomen: Soft, non-tender, bowel sounds active , no hepatosplenomegaly  Extremities: no cyanosis or edema, no joint swelling  Skin: Skin color, texture normal, no rashes   Neurologic: Alert and oriented X3 , cranial nerves intact, sensory and motor normal,    ECG:    LABS:                          11.0   9.99  )-----------( 266      ( 23 Sep 2021 15:48 )             34.2     09-24    139  |  104  |  28<H>  ----------------------------<  157<H>  2.1<LL>   |  30  |  1.25    Ca    7.2<L>      24 Sep 2021 03:21  Phos  1.9     09-24  Mg     1.6     09-24    TPro  7.1  /  Alb  3.4  /  TBili  0.5  /  DBili  x   /  AST  10<L>  /  ALT  22  /  AlkPhos  86  09-23    CARDIAC MARKERS ( 23 Sep 2021 15:48 )  0.039 ng/mL / x     / x     / x     / x              PT/INR - ( 23 Sep 2021 15:48 )   PT: 11.7 sec;   INR: 1.01 ratio         PTT - ( 23 Sep 2021 15:48 )  PTT:26.3 sec  Urinalysis Basic - ( 23 Sep 2021 17:01 )    Color: Yellow / Appearance: Clear / S.010 / pH: x  Gluc: x / Ketone: Trace  / Bili: Negative / Urobili: Negative mg/dL   Blood: x / Protein: 30 mg/dL / Nitrite: Negative   Leuk Esterase: Negative / RBC: 3-5 /HPF / WBC 3-5   Sq Epi: x / Non Sq Epi: Few / Bacteria: Few            RADIOLOGY & ADDITIONAL STUDIES:

## 2021-09-25 LAB
ANION GAP SERPL CALC-SCNC: 5 MMOL/L — SIGNIFICANT CHANGE UP (ref 5–17)
ANION GAP SERPL CALC-SCNC: 5 MMOL/L — SIGNIFICANT CHANGE UP (ref 5–17)
ANION GAP SERPL CALC-SCNC: 6 MMOL/L — SIGNIFICANT CHANGE UP (ref 5–17)
BUN SERPL-MCNC: 10 MG/DL — SIGNIFICANT CHANGE UP (ref 7–23)
BUN SERPL-MCNC: 10 MG/DL — SIGNIFICANT CHANGE UP (ref 7–23)
BUN SERPL-MCNC: 8 MG/DL — SIGNIFICANT CHANGE UP (ref 7–23)
CALCIUM SERPL-MCNC: 7.4 MG/DL — LOW (ref 8.5–10.1)
CHLORIDE SERPL-SCNC: 104 MMOL/L — SIGNIFICANT CHANGE UP (ref 96–108)
CHLORIDE SERPL-SCNC: 106 MMOL/L — SIGNIFICANT CHANGE UP (ref 96–108)
CHLORIDE SERPL-SCNC: 106 MMOL/L — SIGNIFICANT CHANGE UP (ref 96–108)
CO2 SERPL-SCNC: 27 MMOL/L — SIGNIFICANT CHANGE UP (ref 22–31)
CO2 SERPL-SCNC: 29 MMOL/L — SIGNIFICANT CHANGE UP (ref 22–31)
CO2 SERPL-SCNC: 29 MMOL/L — SIGNIFICANT CHANGE UP (ref 22–31)
CREAT SERPL-MCNC: 0.99 MG/DL — SIGNIFICANT CHANGE UP (ref 0.5–1.3)
CREAT SERPL-MCNC: 1.08 MG/DL — SIGNIFICANT CHANGE UP (ref 0.5–1.3)
CREAT SERPL-MCNC: 1.28 MG/DL — SIGNIFICANT CHANGE UP (ref 0.5–1.3)
CULTURE RESULTS: SIGNIFICANT CHANGE UP
GLUCOSE SERPL-MCNC: 243 MG/DL — HIGH (ref 70–99)
GLUCOSE SERPL-MCNC: 270 MG/DL — HIGH (ref 70–99)
GLUCOSE SERPL-MCNC: 361 MG/DL — HIGH (ref 70–99)
MAGNESIUM SERPL-MCNC: 1.5 MG/DL — LOW (ref 1.6–2.6)
MAGNESIUM SERPL-MCNC: 1.6 MG/DL — SIGNIFICANT CHANGE UP (ref 1.6–2.6)
MAGNESIUM SERPL-MCNC: 1.7 MG/DL — SIGNIFICANT CHANGE UP (ref 1.6–2.6)
PHOSPHATE SERPL-MCNC: 1.5 MG/DL — LOW (ref 2.5–4.5)
PHOSPHATE SERPL-MCNC: 2 MG/DL — LOW (ref 2.5–4.5)
PHOSPHATE SERPL-MCNC: 2.1 MG/DL — LOW (ref 2.5–4.5)
POTASSIUM SERPL-MCNC: 2.5 MMOL/L — CRITICAL LOW (ref 3.5–5.3)
POTASSIUM SERPL-MCNC: 2.8 MMOL/L — CRITICAL LOW (ref 3.5–5.3)
POTASSIUM SERPL-MCNC: 3.3 MMOL/L — LOW (ref 3.5–5.3)
POTASSIUM SERPL-SCNC: 2.5 MMOL/L — CRITICAL LOW (ref 3.5–5.3)
POTASSIUM SERPL-SCNC: 2.8 MMOL/L — CRITICAL LOW (ref 3.5–5.3)
POTASSIUM SERPL-SCNC: 3.3 MMOL/L — LOW (ref 3.5–5.3)
SODIUM SERPL-SCNC: 138 MMOL/L — SIGNIFICANT CHANGE UP (ref 135–145)
SODIUM SERPL-SCNC: 138 MMOL/L — SIGNIFICANT CHANGE UP (ref 135–145)
SODIUM SERPL-SCNC: 141 MMOL/L — SIGNIFICANT CHANGE UP (ref 135–145)
SPECIMEN SOURCE: SIGNIFICANT CHANGE UP

## 2021-09-25 PROCEDURE — 99233 SBSQ HOSP IP/OBS HIGH 50: CPT | Mod: GC

## 2021-09-25 PROCEDURE — 93010 ELECTROCARDIOGRAM REPORT: CPT

## 2021-09-25 RX ORDER — POTASSIUM CHLORIDE 20 MEQ
10 PACKET (EA) ORAL
Refills: 0 | Status: COMPLETED | OUTPATIENT
Start: 2021-09-25 | End: 2021-09-25

## 2021-09-25 RX ORDER — POTASSIUM PHOSPHATE, MONOBASIC POTASSIUM PHOSPHATE, DIBASIC 236; 224 MG/ML; MG/ML
15 INJECTION, SOLUTION INTRAVENOUS ONCE
Refills: 0 | Status: COMPLETED | OUTPATIENT
Start: 2021-09-25 | End: 2021-09-25

## 2021-09-25 RX ORDER — HEPARIN SODIUM 5000 [USP'U]/ML
5000 INJECTION INTRAVENOUS; SUBCUTANEOUS EVERY 12 HOURS
Refills: 0 | Status: DISCONTINUED | OUTPATIENT
Start: 2021-09-25 | End: 2021-09-30

## 2021-09-25 RX ORDER — POTASSIUM CHLORIDE 20 MEQ
40 PACKET (EA) ORAL ONCE
Refills: 0 | Status: COMPLETED | OUTPATIENT
Start: 2021-09-25 | End: 2021-09-25

## 2021-09-25 RX ORDER — MAGNESIUM SULFATE 500 MG/ML
1 VIAL (ML) INJECTION ONCE
Refills: 0 | Status: COMPLETED | OUTPATIENT
Start: 2021-09-25 | End: 2021-09-25

## 2021-09-25 RX ORDER — CEFTRIAXONE 500 MG/1
1000 INJECTION, POWDER, FOR SOLUTION INTRAMUSCULAR; INTRAVENOUS EVERY 24 HOURS
Refills: 0 | Status: DISCONTINUED | OUTPATIENT
Start: 2021-09-25 | End: 2021-09-25

## 2021-09-25 RX ORDER — POTASSIUM CHLORIDE 20 MEQ
40 PACKET (EA) ORAL
Refills: 0 | Status: COMPLETED | OUTPATIENT
Start: 2021-09-25 | End: 2021-09-27

## 2021-09-25 RX ORDER — SODIUM,POTASSIUM PHOSPHATES 278-250MG
1 POWDER IN PACKET (EA) ORAL ONCE
Refills: 0 | Status: COMPLETED | OUTPATIENT
Start: 2021-09-25 | End: 2021-09-25

## 2021-09-25 RX ORDER — CEFTRIAXONE 500 MG/1
1000 INJECTION, POWDER, FOR SOLUTION INTRAMUSCULAR; INTRAVENOUS EVERY 24 HOURS
Refills: 0 | Status: DISCONTINUED | OUTPATIENT
Start: 2021-09-25 | End: 2021-09-29

## 2021-09-25 RX ORDER — INSULIN GLARGINE 100 [IU]/ML
40 INJECTION, SOLUTION SUBCUTANEOUS AT BEDTIME
Refills: 0 | Status: DISCONTINUED | OUTPATIENT
Start: 2021-09-25 | End: 2021-09-26

## 2021-09-25 RX ADMIN — LATANOPROST 1 DROP(S): 0.05 SOLUTION/ DROPS OPHTHALMIC; TOPICAL at 20:41

## 2021-09-25 RX ADMIN — Medication 100 MILLIEQUIVALENT(S): at 01:05

## 2021-09-25 RX ADMIN — Medication 40 MILLIEQUIVALENT(S): at 00:49

## 2021-09-25 RX ADMIN — HEPARIN SODIUM 5000 UNIT(S): 5000 INJECTION INTRAVENOUS; SUBCUTANEOUS at 20:53

## 2021-09-25 RX ADMIN — CEFTRIAXONE 1000 MILLIGRAM(S): 500 INJECTION, POWDER, FOR SOLUTION INTRAMUSCULAR; INTRAVENOUS at 20:44

## 2021-09-25 RX ADMIN — Medication 100 MILLIEQUIVALENT(S): at 11:46

## 2021-09-25 RX ADMIN — RISPERIDONE 2 MILLIGRAM(S): 4 TABLET ORAL at 20:43

## 2021-09-25 RX ADMIN — Medication 1 PACKET(S): at 08:56

## 2021-09-25 RX ADMIN — ARIPIPRAZOLE 30 MILLIGRAM(S): 15 TABLET ORAL at 09:05

## 2021-09-25 RX ADMIN — Medication 325 MILLIGRAM(S): at 20:43

## 2021-09-25 RX ADMIN — Medication 4: at 11:46

## 2021-09-25 RX ADMIN — Medication 100 MILLIEQUIVALENT(S): at 03:24

## 2021-09-25 RX ADMIN — Medication 100 MILLIEQUIVALENT(S): at 08:56

## 2021-09-25 RX ADMIN — Medication 100 MILLIEQUIVALENT(S): at 13:35

## 2021-09-25 RX ADMIN — Medication 100 MILLIEQUIVALENT(S): at 15:13

## 2021-09-25 RX ADMIN — CITALOPRAM 30 MILLIGRAM(S): 10 TABLET, FILM COATED ORAL at 09:05

## 2021-09-25 RX ADMIN — Medication 40 MILLIEQUIVALENT(S): at 13:35

## 2021-09-25 RX ADMIN — POTASSIUM PHOSPHATE, MONOBASIC POTASSIUM PHOSPHATE, DIBASIC 62.5 MILLIMOLE(S): 236; 224 INJECTION, SOLUTION INTRAVENOUS at 17:52

## 2021-09-25 RX ADMIN — Medication 40 MILLIEQUIVALENT(S): at 20:53

## 2021-09-25 RX ADMIN — Medication 100 MILLIEQUIVALENT(S): at 10:20

## 2021-09-25 RX ADMIN — PREGABALIN 500 MICROGRAM(S): 225 CAPSULE ORAL at 09:05

## 2021-09-25 RX ADMIN — Medication 100 GRAM(S): at 16:41

## 2021-09-25 RX ADMIN — Medication 8: at 17:09

## 2021-09-25 RX ADMIN — Medication 6: at 20:41

## 2021-09-25 RX ADMIN — INSULIN GLARGINE 40 UNIT(S): 100 INJECTION, SOLUTION SUBCUTANEOUS at 21:44

## 2021-09-25 RX ADMIN — BUDESONIDE AND FORMOTEROL FUMARATE DIHYDRATE 2 PUFF(S): 160; 4.5 AEROSOL RESPIRATORY (INHALATION) at 08:44

## 2021-09-25 RX ADMIN — Medication 100 MILLIEQUIVALENT(S): at 16:41

## 2021-09-25 RX ADMIN — CITALOPRAM 30 MILLIGRAM(S): 10 TABLET, FILM COATED ORAL at 20:42

## 2021-09-25 RX ADMIN — Medication 100 MILLIEQUIVALENT(S): at 02:23

## 2021-09-25 RX ADMIN — RISPERIDONE 2 MILLIGRAM(S): 4 TABLET ORAL at 09:05

## 2021-09-25 RX ADMIN — Medication 6: at 08:17

## 2021-09-25 NOTE — PROGRESS NOTE ADULT - ASSESSMENT
1) Hyperglycemia   2) Hypokalemia  3) Translocation Hyponatremia  4) COPD  5) Pulmonary Hypertension  6) Prolonged QTc  7) COLLEEN    62 yo obese F with PMHx significant for COPD (dependent on 4L O2 via NC at home), pulmonary HTN, IDDM, COLLEEN on CPAP, and HTN BIBEMS from pulmonologist office due to glucose over 600 despite taking insulin.  Patient currently taking metformin 1000mg, proglitazone 45 mg daily and novolin 120 units BID before meals. Patient states that she is compliant but HbA1c noted to be 11.  Was supposed to be seen in the office yesterday for COPD but due to the BG being >600, was sent to  and brought in by ambulance   In the ED, pt glucose >600, K+ 1.7, Mg 1.3, Cr. 1.98,, Ca+ 7.7, Cl- 90, small acetone, PH of 7.33, trace amount of ketone in the urine.  Patient given 25 units of lantus, 25 units of humulin, mag and potassium repleted.   EKG QTc noted to be 649 thereafter 624  Pulmonary wise, she has a history of COPD, pulmonary hypertension, COLLEEN  She has a history of PH, PFT in the past revealed normal spirometry with an RV/TLC of 166/124%, air trapping/hyperinflation. DLCO 58%  On Breo daily and albuterol PRN  Uses CPAP nightly [id number] (Auto set at home 5-15 cmH2O)  Pulmonary wise, status is appropriate and respiratory muscle weakness that she was experiencing likely 2/2 to electrolyte abnormalities in the setting of hyperglycemia  EKG reviewed, QTc noted to be 649 and 624  Repeat EKG, electrolytes, continue closely trending Mg/K+/Ph/Ca  Recommend to continue monitoring on telemetry floor since she is at high risk of subsequent events from prolonged QT  Will discuss further with hospitalist

## 2021-09-25 NOTE — PROGRESS NOTE ADULT - SUBJECTIVE AND OBJECTIVE BOX
HPI:  60 yo obese F with PMHx significant for COPD (dependent on 4L O2 via NC at home), pulmonary HTN, IDDM, COLLEEN on CPAP, and HTN BIBEMS from pulmonologist office due to glucose over 600 despite taking insulin.  Patient currently taking metformin 1000mg, proglitazone 45 mg daily and novolin 120 units BID before meals. Patient is adherent to medication regimen. Patients states that for the past month her glucose levels have been similar to the  glucose on admission and has been experiencing generalized weakness, lethargy, unbalance, xerostomia, polydipsia, polyuria and intermittent palpitations. Patient has not been sick recently, no sick contacts and denies alcohol/smoking/recreational drugs.   Pt denies chest pain, fever, chills. nausea, vomting, diarrhrea, abd pain, dysuria, polyphagia.     In the ED, pt glucose >600, K+ 1.7, Mg 1.3, Cr. 1.98,, Ca+ 7.7, Cl- 90, small acetone, PH of 7.33, trace amount of ketone in the urine.  Patient given 25 units of lantus, 25 units of humulin, mag and potassium repleted. She was bolused 2L NS. Case discussed with ICU ( Dr. Reilly) who feels patient does not need ICU care at this time. Admit to tele unit instead.  (23 Sep 2021 20:14)    SUBJECTIVE:   Pt seen and evaluate at bedside. No acute events. Has no new compliants. Prolonged QTc noted.     REVIEW OF SYSTEMS:  CONSTITUTIONAL: No weakness, fevers or chills  EYES/ENT: No visual changes;  No vertigo or throat pain   NECK: No pain or stiffness  RESPIRATORY: No cough, wheezing, hemoptysis; No shortness of breath  CARDIOVASCULAR: No chest pain or palpitations  GASTROINTESTINAL: No abdominal or epigastric pain. No nausea, vomiting, or hematemesis; No diarrhea or constipation. No melena or hematochezia.  GENITOURINARY: No dysuria, frequency or hematuria  NEUROLOGICAL: No numbness or weakness  SKIN: No itching, burning, rashes, or lesions   All other review of systems is negative unless indicated above    Vital Signs Last 24 Hrs  Vital Signs Last 24 Hrs  T(C): 37.2 (25 Sep 2021 12:45), Max: 37.3 (25 Sep 2021 00:36)  T(F): 99 (25 Sep 2021 12:45), Max: 99.2 (25 Sep 2021 00:36)  HR: 81 (25 Sep 2021 16:00) (76 - 91)  BP: 132/80 (25 Sep 2021 16:00) (84/40 - 140/86)  BP(mean): 91 (25 Sep 2021 16:00) (50 - 102)  RR: 24 (25 Sep 2021 15:00) (15 - 30)  SpO2: 100% (25 Sep 2021 16:00) (92% - 100%)    CAPILLARY BLOOD GLUCOSE  POCT Blood Glucose.: 249 mg/dL (25 Sep 2021 11:42)  POCT Blood Glucose.: 276 mg/dL (25 Sep 2021 07:46)  POCT Blood Glucose.: 379 mg/dL (24 Sep 2021 21:59)  POCT Blood Glucose.: 341 mg/dL (24 Sep 2021 16:59)  POCT Blood Glucose.: 341 mg/dL (24 Sep 2021 16:59)  POCT Blood Glucose.: 337 mg/dL (24 Sep 2021 12:02)  POCT Blood Glucose.: 325 mg/dL (24 Sep 2021 10:08)  POCT Blood Glucose.: 195 mg/dL (24 Sep 2021 08:00)  POCT Blood Glucose.: 173 mg/dL (24 Sep 2021 06:35)  POCT Blood Glucose.: 155 mg/dL (24 Sep 2021 04:07)  POCT Blood Glucose.: 156 mg/dL (24 Sep 2021 02:10)  POCT Blood Glucose.: 97 mg/dL (24 Sep 2021 00:33)  POCT Blood Glucose.: 128 mg/dL (23 Sep 2021 22:52)  POCT Blood Glucose.: 424 mg/dL (23 Sep 2021 19:53)  POCT Blood Glucose.: >600 mg/dL (23 Sep 2021 17:40)      PHYSICAL EXAM:  Constitutional: NAD, awake and alert, well-developed  HEENT: PERR, EOMI, Normal Hearing, MMM  Neck: Soft and supple, No LAD, No JVD  Respiratory: Breath sounds are clear bilaterally, No wheezing, rales or rhonchi  Cardiovascular: S1 and S2, regular rate and rhythm, no Murmurs, gallops or rubs  Gastrointestinal: Bowel Sounds present, soft, nontender, nondistended, no guarding, no rebound  Extremities: No peripheral edema  Vascular: 2+ peripheral pulses  Neurological: A/O x 3, no focal deficits  Musculoskeletal: 5/5 strength b/l upper and lower extremities  Skin: No rashes    MEDICATIONS:  MEDICATIONS  (STANDING):  ARIPiprazole 30 milliGRAM(s) Oral daily  budesonide  80 MICROgram(s)/formoterol 4.5 MICROgram(s) Inhaler 2 Puff(s) Inhalation two times a day  citalopram 30 milliGRAM(s) Oral <User Schedule>  cyanocobalamin 500 MICROGram(s) Oral daily  dextrose 40% Gel 15 Gram(s) Oral once  dextrose 5%. 1000 milliLiter(s) (100 mL/Hr) IV Continuous <Continuous>  dextrose 5%. 1000 milliLiter(s) (50 mL/Hr) IV Continuous <Continuous>  dextrose 50% Injectable 25 Gram(s) IV Push once  dextrose 50% Injectable 12.5 Gram(s) IV Push once  dextrose 50% Injectable 25 Gram(s) IV Push once  ferrous    sulfate 325 milliGRAM(s) Oral at bedtime  glucagon  Injectable 1 milliGRAM(s) IntraMuscular once  heparin   Injectable 5000 Unit(s) SubCutaneous every 12 hours  influenza   Vaccine 0.5 milliLiter(s) IntraMuscular once  insulin glargine Injectable (LANTUS) 25 Unit(s) SubCutaneous at bedtime  insulin lispro (ADMELOG) corrective regimen sliding scale   SubCutaneous three times a day before meals  insulin lispro (ADMELOG) corrective regimen sliding scale   SubCutaneous at bedtime  latanoprost 0.005% Ophthalmic Solution 1 Drop(s) Both EYES at bedtime  potassium chloride    Tablet ER 40 milliEquivalent(s) Oral two times a day  potassium phosphate IVPB 15 milliMole(s) IV Intermittent once  risperiDONE   Tablet 2 milliGRAM(s) Oral two times a day    MEDICATIONS  (PRN):  acetaminophen   Tablet .. 650 milliGRAM(s) Oral every 6 hours PRN Mild Pain (1 - 3)  ALBUTerol    90 MICROgram(s) HFA Inhaler 2 Puff(s) Inhalation every 6 hours PRN for shortness of breath and/or wheezing        LABS: All Labs Reviewed:                        9.9    8.13  )-----------( 246      ( 24 Sep 2021 07:29 )             31.4     09-25    138  |  106  |  8   ----------------------------<  243<H>  2.8<LL>   |  27  |  1.08    Ca    7.4<L>      25 Sep 2021 12:33  Phos  1.5     09-25  Mg     1.5     09-25    TPro  6.1  /  Alb  2.8<L>  /  TBili  0.4  /  DBili  x   /  AST  11<L>  /  ALT  19  /  AlkPhos  65  09-24

## 2021-09-25 NOTE — PROGRESS NOTE ADULT - SUBJECTIVE AND OBJECTIVE BOX
Patient is a 61y old  Female who presents with a chief complaint of Hyperglycemia, Hypokalemia, Hypomagnesemia (23 Sep 2021 20:14)      HPI:  62 yo obese F with PMHx significant for COPD (dependent on 4L O2 via NC at home), pulmonary HTN, IDDM, COLLEEN on CPAP, and HTN BIBEMS from pulmonologist office due to glucose over 600 despite taking insulin.  Patient currently taking metformin 1000mg, proglitazone 45 mg daily and novolin 120 units BID before meals. Patient states that she is compliant but HbA1c noted to be 11.  Was supposed to be seen in the office yesterday for COPD but due to the BG being >600, was sent to  and brought in by ambulance   In the ED, pt glucose >600, K+ 1.7, Mg 1.3, Cr. 1.98,, Ca+ 7.7, Cl- 90, small acetone, PH of 7.33, trace amount of ketone in the urine.  Patient given 25 units of lantus, 25 units of humulin, mag and potassium repleted.   EKG QTc noted to be 649   Repeat Glucose is 158, K 2.1  Pulmonary wise, she has a history of COPD, pulmonary hypertension, COLLEEN      PAST MEDICAL & SURGICAL HISTORY:  Bipolar 1 disorder    Diabetes mellitus  Type 2    Hypertension    COLLEEN (obstructive sleep apnea)    ADHD (attention deficit hyperactivity disorder)    COPD (chronic obstructive pulmonary disease)    Anemia     delivery delivered  x2    Gastric bypass status for obesity        PREVIOUS DIAGNOSTIC TESTING:      MEDICATIONS  (STANDING):  ARIPiprazole 30 milliGRAM(s) Oral daily  budesonide  80 MICROgram(s)/formoterol 4.5 MICROgram(s) Inhaler 2 Puff(s) Inhalation two times a day  citalopram 30 milliGRAM(s) Oral <User Schedule>  cyanocobalamin 500 MICROGram(s) Oral daily  dextrose 40% Gel 15 Gram(s) Oral once  dextrose 5%. 1000 milliLiter(s) (100 mL/Hr) IV Continuous <Continuous>  dextrose 5%. 1000 milliLiter(s) (50 mL/Hr) IV Continuous <Continuous>  dextrose 50% Injectable 25 Gram(s) IV Push once  dextrose 50% Injectable 12.5 Gram(s) IV Push once  dextrose 50% Injectable 25 Gram(s) IV Push once  ferrous    sulfate 325 milliGRAM(s) Oral at bedtime  glucagon  Injectable 1 milliGRAM(s) IntraMuscular once  insulin glargine Injectable (LANTUS) 25 Unit(s) SubCutaneous at bedtime  insulin lispro (ADMELOG) corrective regimen sliding scale   SubCutaneous three times a day before meals  insulin lispro (ADMELOG) corrective regimen sliding scale   SubCutaneous at bedtime  latanoprost 0.005% Ophthalmic Solution 1 Drop(s) Both EYES at bedtime  risperiDONE   Tablet 2 milliGRAM(s) Oral two times a day  sodium chloride 0.9% with potassium chloride 20 mEq/L 1000 milliLiter(s) (75 mL/Hr) IV Continuous <Continuous>    MEDICATIONS  (PRN):  acetaminophen   Tablet .. 650 milliGRAM(s) Oral every 6 hours PRN Mild Pain (1 - 3)  ALBUTerol    90 MICROgram(s) HFA Inhaler 2 Puff(s) Inhalation every 6 hours PRN for shortness of breath and/or wheezing      FAMILY HISTORY:  Family history of leukemia  mother and two cousins, all now     Family history of heart disease  father    Vital Signs Last 24 Hrs  T(C): 37 (25 Sep 2021 06:52), Max: 37.3 (25 Sep 2021 00:36)  T(F): 98.6 (25 Sep 2021 06:52), Max: 99.2 (25 Sep 2021 00:36)  HR: 82 (25 Sep 2021 08:00) (76 - 96)  BP: 129/65 (25 Sep 2021 08:00) (84/40 - 140/86)  BP(mean): 80 (25 Sep 2021 08:00) (50 - 102)  RR: 20 (25 Sep 2021 08:00) (15 - 28)  SpO2: 100% (25 Sep 2021 08:00) (97% - 100%)    I&O's Summary    PHYSICAL EXAM  General Appearance: cooperative, no acute distress,   HEENT: PERRL, conjunctiva clear, EOM's intact, non injected pharynx, no exudate, TM   normal  Neck: Supple, , no adenopathy, thyroid: not enlarged, no carotid bruit or JVD  Back: Symmetric, no  tenderness,no soft tissue tenderness  Lungs: Clear to auscultation bilateral,no adventitious breath sounds, normal   expiratory phase  Heart: Regular rate and rhythm, S1, S2 normal, no murmur, rub or gallop  Abdomen: Soft, non-tender, bowel sounds active , no hepatosplenomegaly  Extremities: no cyanosis or edema, no joint swelling  Skin: Skin color, texture normal, no rashes   Neurologic: Alert and oriented X3 , cranial nerves intact, sensory and motor normal,    ECG:    LABS:                          11.0   9.99  )-----------( 266      ( 23 Sep 2021 15:48 )             34.2     09-24    139  |  104  |  28<H>  ----------------------------<  157<H>  2.1<LL>   |  30  |  1.25    Ca    7.2<L>      24 Sep 2021 03:21  Phos  1.9     -  Mg     1.6     -    TPro  7.1  /  Alb  3.4  /  TBili  0.5  /  DBili  x   /  AST  10<L>  /  ALT  22  /  AlkPhos  86  09-23    CARDIAC MARKERS ( 23 Sep 2021 15:48 )  0.039 ng/mL / x     / x     / x     / x              PT/INR - ( 23 Sep 2021 15:48 )   PT: 11.7 sec;   INR: 1.01 ratio         PTT - ( 23 Sep 2021 15:48 )  PTT:26.3 sec  Urinalysis Basic - ( 23 Sep 2021 17:01 )    Color: Yellow / Appearance: Clear / S.010 / pH: x  Gluc: x / Ketone: Trace  / Bili: Negative / Urobili: Negative mg/dL   Blood: x / Protein: 30 mg/dL / Nitrite: Negative   Leuk Esterase: Negative / RBC: 3-5 /HPF / WBC 3-5   Sq Epi: x / Non Sq Epi: Few / Bacteria: Few            RADIOLOGY & ADDITIONAL STUDIES:

## 2021-09-25 NOTE — PROGRESS NOTE ADULT - ASSESSMENT
60 yo F as described above presenting with hyperglycemia and abnormal electrolytes concerning for DKA    #Early HHS   -Glucose >600 on admission down to 128 s/p 25 units lantus and 25 units humulin in ED. Unclear why such high dose of insulin was given despite severe potassium of 1.7 and risk of hypoglycemia.   -s/p IV fluids, multiple K-riders   -on Lantus 25u, will increase tonight based on coverage   -c/w ISS   -severe electrolytes derangement, will monitor and replete PRN  -tolerating PO , no gap   -monitor BGMS  -Diet consistent carbs.  -f/u 5PM BMP, mag and phos and replete PRN    # Severe Hypokalemia, Hypomagnesemia and Hypophosphatemia   -on admission 1.7  -Currently 2.8  -aggressively replenished, prolonged QTc in AM, no significantly improved, current QTc: 477ms   -f/u BMP, Mg and Phos: due at 5PM, replete PRN    #ADINA  -likely due to dehydration secondary to osmotic diuresis   -s/p IV   -encourage increase PO intake     #Hyponatremia   -resolved     #Normocytic anemia  - Chronic, possibly related to hx of gastric bypass surgery (2007)  - On iron and B12 supplementation  - Last colonoscopy 2019, normal as per patient    #HTN  - Normotensive  - Hold losartan for now in light of ADINA  - Hold HCTZ due to electrolyte abnormalities  - Monitor BP    #COLLEEN  - On CPAP at bedtime    #COPD  - stable  - Supplemental O2  - Continue inhalers    #Pulmonary HTN  - moderate pulm HTN noted on ECHO (Nov 2018)  - Likely related to underlying COPD, COLLEEN    #DVT PPx  - Heparin SQ    #Advanced Directives  - FULL CODE  - HCP : Daughter Ronit    *Case discussed with Dr. Sorto  60 yo F as described above presenting with hyperglycemia and abnormal electrolytes concerning for DKA    #Early HHS   -Glucose >600 on admission down to 128 s/p 25 units lantus and 25 units humulin in ED. Unclear why such high dose of insulin was given despite severe potassium of 1.7 and risk of hypoglycemia.   -s/p IV fluids, multiple K-riders   -A1c: 11.0  -on Lantus 25u, will increase tonight based on coverage   -c/w ISS   -severe electrolytes derangement, will monitor and replete PRN  -tolerating PO , no gap   -monitor BGMS  -Diet consistent carbs.  -f/u 5PM BMP, mag and phos and replete PRN    # Severe Hypokalemia, Hypomagnesemia and Hypophosphatemia   -on admission 1.7  -Currently 2.8  -aggressively replenished, prolonged QTc in AM, no significantly improved, current QTc: 477ms   -f/u BMP, Mg and Phos: due at 5PM, replete PRN    #ADINA  -likely due to dehydration secondary to osmotic diuresis   -s/p IV   -encourage increase PO intake     #Hyponatremia   -resolved     #Normocytic anemia  - Chronic, possibly related to hx of gastric bypass surgery (2007)  - On iron and B12 supplementation  - Last colonoscopy 2019, normal as per patient    #HTN  - Normotensive  - Hold losartan for now in light of ADINA  - Hold HCTZ due to electrolyte abnormalities  - Monitor BP    #COLLEEN  - On CPAP at bedtime    #COPD  - stable  - Supplemental O2  - Continue inhalers    #Pulmonary HTN  - moderate pulm HTN noted on ECHO (Nov 2018)  - Likely related to underlying COPD, COLLEEN    #DVT PPx  - Heparin SQ    #Advanced Directives  - FULL CODE  - HCP : Daughter Ronit    *Case discussed with Dr. Sorto

## 2021-09-26 LAB
ADD ON TEST-SPECIMEN IN LAB: SIGNIFICANT CHANGE UP
ALBUMIN SERPL ELPH-MCNC: 2.7 G/DL — LOW (ref 3.3–5)
ALP SERPL-CCNC: 68 U/L — SIGNIFICANT CHANGE UP (ref 40–120)
ALT FLD-CCNC: 19 U/L — SIGNIFICANT CHANGE UP (ref 12–78)
ANION GAP SERPL CALC-SCNC: 3 MMOL/L — LOW (ref 5–17)
ANION GAP SERPL CALC-SCNC: 7 MMOL/L — SIGNIFICANT CHANGE UP (ref 5–17)
AST SERPL-CCNC: 7 U/L — LOW (ref 15–37)
BASOPHILS # BLD AUTO: 0.04 K/UL — SIGNIFICANT CHANGE UP (ref 0–0.2)
BASOPHILS NFR BLD AUTO: 0.4 % — SIGNIFICANT CHANGE UP (ref 0–2)
BILIRUB SERPL-MCNC: 0.5 MG/DL — SIGNIFICANT CHANGE UP (ref 0.2–1.2)
BUN SERPL-MCNC: 12 MG/DL — SIGNIFICANT CHANGE UP (ref 7–23)
BUN SERPL-MCNC: 12 MG/DL — SIGNIFICANT CHANGE UP (ref 7–23)
CALCIUM SERPL-MCNC: 7.3 MG/DL — LOW (ref 8.5–10.1)
CALCIUM SERPL-MCNC: 7.6 MG/DL — LOW (ref 8.5–10.1)
CHLORIDE SERPL-SCNC: 106 MMOL/L — SIGNIFICANT CHANGE UP (ref 96–108)
CHLORIDE SERPL-SCNC: 107 MMOL/L — SIGNIFICANT CHANGE UP (ref 96–108)
CO2 SERPL-SCNC: 27 MMOL/L — SIGNIFICANT CHANGE UP (ref 22–31)
CO2 SERPL-SCNC: 31 MMOL/L — SIGNIFICANT CHANGE UP (ref 22–31)
CREAT ?TM UR-MCNC: 35 MG/DL — SIGNIFICANT CHANGE UP
CREAT SERPL-MCNC: 1.06 MG/DL — SIGNIFICANT CHANGE UP (ref 0.5–1.3)
CREAT SERPL-MCNC: 1.18 MG/DL — SIGNIFICANT CHANGE UP (ref 0.5–1.3)
EOSINOPHIL # BLD AUTO: 0.12 K/UL — SIGNIFICANT CHANGE UP (ref 0–0.5)
EOSINOPHIL NFR BLD AUTO: 1.2 % — SIGNIFICANT CHANGE UP (ref 0–6)
GLUCOSE SERPL-MCNC: 241 MG/DL — HIGH (ref 70–99)
GLUCOSE SERPL-MCNC: 393 MG/DL — HIGH (ref 70–99)
HCT VFR BLD CALC: 32.6 % — LOW (ref 34.5–45)
HGB BLD-MCNC: 10.3 G/DL — LOW (ref 11.5–15.5)
IMM GRANULOCYTES NFR BLD AUTO: 0.4 % — SIGNIFICANT CHANGE UP (ref 0–1.5)
LYMPHOCYTES # BLD AUTO: 1.6 K/UL — SIGNIFICANT CHANGE UP (ref 1–3.3)
LYMPHOCYTES # BLD AUTO: 15.8 % — SIGNIFICANT CHANGE UP (ref 13–44)
MAGNESIUM SERPL-MCNC: 1.5 MG/DL — LOW (ref 1.6–2.6)
MCHC RBC-ENTMCNC: 26.8 PG — LOW (ref 27–34)
MCHC RBC-ENTMCNC: 31.6 GM/DL — LOW (ref 32–36)
MCV RBC AUTO: 84.9 FL — SIGNIFICANT CHANGE UP (ref 80–100)
MONOCYTES # BLD AUTO: 0.82 K/UL — SIGNIFICANT CHANGE UP (ref 0–0.9)
MONOCYTES NFR BLD AUTO: 8.1 % — SIGNIFICANT CHANGE UP (ref 2–14)
NEUTROPHILS # BLD AUTO: 7.52 K/UL — HIGH (ref 1.8–7.4)
NEUTROPHILS NFR BLD AUTO: 74.1 % — SIGNIFICANT CHANGE UP (ref 43–77)
PHOSPHATE SERPL-MCNC: 2.2 MG/DL — LOW (ref 2.5–4.5)
PLATELET # BLD AUTO: 213 K/UL — SIGNIFICANT CHANGE UP (ref 150–400)
POTASSIUM SERPL-MCNC: 2.6 MMOL/L — CRITICAL LOW (ref 3.5–5.3)
POTASSIUM SERPL-MCNC: 3.6 MMOL/L — SIGNIFICANT CHANGE UP (ref 3.5–5.3)
POTASSIUM SERPL-SCNC: 2.6 MMOL/L — CRITICAL LOW (ref 3.5–5.3)
POTASSIUM SERPL-SCNC: 3.6 MMOL/L — SIGNIFICANT CHANGE UP (ref 3.5–5.3)
PROT ?TM UR-MCNC: 26 MG/DL — HIGH (ref 0–12)
PROT SERPL-MCNC: 6 GM/DL — SIGNIFICANT CHANGE UP (ref 6–8.3)
PROT/CREAT UR-RTO: 0.7 RATIO — HIGH (ref 0–0.2)
RBC # BLD: 3.84 M/UL — SIGNIFICANT CHANGE UP (ref 3.8–5.2)
RBC # FLD: 12.6 % — SIGNIFICANT CHANGE UP (ref 10.3–14.5)
SODIUM SERPL-SCNC: 140 MMOL/L — SIGNIFICANT CHANGE UP (ref 135–145)
SODIUM SERPL-SCNC: 141 MMOL/L — SIGNIFICANT CHANGE UP (ref 135–145)
WBC # BLD: 10.14 K/UL — SIGNIFICANT CHANGE UP (ref 3.8–10.5)
WBC # FLD AUTO: 10.14 K/UL — SIGNIFICANT CHANGE UP (ref 3.8–10.5)

## 2021-09-26 PROCEDURE — 93010 ELECTROCARDIOGRAM REPORT: CPT

## 2021-09-26 PROCEDURE — 93971 EXTREMITY STUDY: CPT | Mod: 26,RT

## 2021-09-26 PROCEDURE — 99233 SBSQ HOSP IP/OBS HIGH 50: CPT | Mod: GC

## 2021-09-26 RX ORDER — POTASSIUM CHLORIDE 20 MEQ
10 PACKET (EA) ORAL ONCE
Refills: 0 | Status: COMPLETED | OUTPATIENT
Start: 2021-09-26 | End: 2021-09-26

## 2021-09-26 RX ORDER — ACETAMINOPHEN 500 MG
650 TABLET ORAL EVERY 6 HOURS
Refills: 0 | Status: DISCONTINUED | OUTPATIENT
Start: 2021-09-26 | End: 2021-09-30

## 2021-09-26 RX ORDER — SODIUM,POTASSIUM PHOSPHATES 278-250MG
1 POWDER IN PACKET (EA) ORAL ONCE
Refills: 0 | Status: COMPLETED | OUTPATIENT
Start: 2021-09-26 | End: 2021-09-26

## 2021-09-26 RX ORDER — POTASSIUM CHLORIDE 20 MEQ
10 PACKET (EA) ORAL
Refills: 0 | Status: COMPLETED | OUTPATIENT
Start: 2021-09-26 | End: 2021-09-26

## 2021-09-26 RX ORDER — POTASSIUM CHLORIDE 20 MEQ
40 PACKET (EA) ORAL ONCE
Refills: 0 | Status: COMPLETED | OUTPATIENT
Start: 2021-09-26 | End: 2021-09-26

## 2021-09-26 RX ORDER — MAGNESIUM SULFATE 500 MG/ML
2 VIAL (ML) INJECTION ONCE
Refills: 0 | Status: COMPLETED | OUTPATIENT
Start: 2021-09-26 | End: 2021-09-26

## 2021-09-26 RX ORDER — INSULIN GLARGINE 100 [IU]/ML
20 INJECTION, SOLUTION SUBCUTANEOUS
Refills: 0 | Status: DISCONTINUED | OUTPATIENT
Start: 2021-09-26 | End: 2021-09-27

## 2021-09-26 RX ADMIN — Medication 325 MILLIGRAM(S): at 21:06

## 2021-09-26 RX ADMIN — HEPARIN SODIUM 5000 UNIT(S): 5000 INJECTION INTRAVENOUS; SUBCUTANEOUS at 09:55

## 2021-09-26 RX ADMIN — HEPARIN SODIUM 5000 UNIT(S): 5000 INJECTION INTRAVENOUS; SUBCUTANEOUS at 21:06

## 2021-09-26 RX ADMIN — Medication 6: at 21:00

## 2021-09-26 RX ADMIN — CITALOPRAM 30 MILLIGRAM(S): 10 TABLET, FILM COATED ORAL at 09:55

## 2021-09-26 RX ADMIN — LATANOPROST 1 DROP(S): 0.05 SOLUTION/ DROPS OPHTHALMIC; TOPICAL at 21:06

## 2021-09-26 RX ADMIN — Medication 6: at 12:02

## 2021-09-26 RX ADMIN — Medication 1 PACKET(S): at 18:06

## 2021-09-26 RX ADMIN — Medication 100 MILLIEQUIVALENT(S): at 12:03

## 2021-09-26 RX ADMIN — ARIPIPRAZOLE 30 MILLIGRAM(S): 15 TABLET ORAL at 09:55

## 2021-09-26 RX ADMIN — ALBUTEROL 2 PUFF(S): 90 AEROSOL, METERED ORAL at 15:05

## 2021-09-26 RX ADMIN — Medication 40 MILLIEQUIVALENT(S): at 21:04

## 2021-09-26 RX ADMIN — BUDESONIDE AND FORMOTEROL FUMARATE DIHYDRATE 2 PUFF(S): 160; 4.5 AEROSOL RESPIRATORY (INHALATION) at 08:49

## 2021-09-26 RX ADMIN — Medication 40 MILLIEQUIVALENT(S): at 09:55

## 2021-09-26 RX ADMIN — RISPERIDONE 2 MILLIGRAM(S): 4 TABLET ORAL at 09:55

## 2021-09-26 RX ADMIN — Medication 100 MILLIEQUIVALENT(S): at 08:13

## 2021-09-26 RX ADMIN — CITALOPRAM 30 MILLIGRAM(S): 10 TABLET, FILM COATED ORAL at 21:05

## 2021-09-26 RX ADMIN — Medication 40 MILLIEQUIVALENT(S): at 08:13

## 2021-09-26 RX ADMIN — BUDESONIDE AND FORMOTEROL FUMARATE DIHYDRATE 2 PUFF(S): 160; 4.5 AEROSOL RESPIRATORY (INHALATION) at 21:35

## 2021-09-26 RX ADMIN — CEFTRIAXONE 1000 MILLIGRAM(S): 500 INJECTION, POWDER, FOR SOLUTION INTRAMUSCULAR; INTRAVENOUS at 20:03

## 2021-09-26 RX ADMIN — Medication 10: at 08:28

## 2021-09-26 RX ADMIN — Medication 50 GRAM(S): at 12:01

## 2021-09-26 RX ADMIN — PREGABALIN 500 MICROGRAM(S): 225 CAPSULE ORAL at 09:55

## 2021-09-26 RX ADMIN — Medication 100 MILLIEQUIVALENT(S): at 13:01

## 2021-09-26 RX ADMIN — Medication 8: at 17:21

## 2021-09-26 RX ADMIN — RISPERIDONE 2 MILLIGRAM(S): 4 TABLET ORAL at 21:04

## 2021-09-26 RX ADMIN — INSULIN GLARGINE 20 UNIT(S): 100 INJECTION, SOLUTION SUBCUTANEOUS at 21:03

## 2021-09-26 NOTE — PROGRESS NOTE ADULT - ASSESSMENT
1) Hyperglycemia   2) Hypokalemia  3) Translocation Hyponatremia  4) COPD  5) Pulmonary Hypertension  6) Prolonged QTc  7) COLLEEN    60 yo obese F with PMHx significant for COPD (dependent on 4L O2 via NC at home), pulmonary HTN, IDDM, COLLEEN on CPAP, and HTN BIBEMS from pulmonologist office due to glucose over 600 despite taking insulin.  Patient currently taking metformin 1000mg, proglitazone 45 mg daily and novolin 120 units BID before meals. Patient states that she is compliant but HbA1c noted to be 11.  Was supposed to be seen in the office yesterday for COPD but due to the BG being >600, was sent to  and brought in by ambulance   In the ED, pt glucose >600, K+ 1.7, Mg 1.3, Cr. 1.98,, Ca+ 7.7, Cl- 90, small acetone, PH of 7.33, trace amount of ketone in the urine.  Patient given 25 units of lantus, 25 units of humulin, mag and potassium repleted.   Pulmonary wise, she has a history of COPD, pulmonary hypertension, COLLEEN  She has a history of PH, PFT in the past revealed normal spirometry with an RV/TLC of 166/124%, air trapping/hyperinflation. DLCO 58%  On Breo daily outpatient and albuterol PRN; now on Symbicort   Uses CPAP nightly [id number] (Auto set at home 5-15 cmH2O)  Pulmonary wise, status is appropriate and respiratory muscle weakness that she was experiencing likely 2/2 to electrolyte abnormalities in the setting of hyperglycemia  EKG reviewed, QTc noted to be 649 and 624 now 476  Continue closely trending Mg/K+/Ph/Ca  Recommend to continue monitoring on telemetry floor since she is at high risk of subsequent events from prolonged QT which is now <500.  Follow up urinary studies to assess cause of persistent hypokalemia/nephrology consult if appropriate   Will discuss further with hospitalist

## 2021-09-26 NOTE — CHART NOTE - NSCHARTNOTEFT_GEN_A_CORE
Called in to evaluate the pt for possible infiltrated IV. Pt seen and evaluated at bedside. Mild pain at the right AC fossa. Exam: +erythema, swelling and ?purulent discharge     A/P   -d/c IV in the right arm   -start rocephin 1gm for possible overlying cellulitis   -US duplex RUE ordered  -BCx x2 ordered Called in to evaluate the pt for possible infiltrated IV. Pt seen and evaluated at bedside. Mild pain at the right AC fossa. Exam: +erythema, swelling and ?purulent discharge     Assessment   -Superficial Thrombophlebitis  -?Cellulitis     Plan   -d/c IV in the right arm   -start Rocephin 1gm for possible overlying cellulitis   -US duplex RUE ordered  -BCx x2 ordered Called in to evaluate the pt for possible infiltrated IV. Pt seen and evaluated at bedside. Mild pain at the right AC fossa. Exam: +erythema, swelling and ?purulent discharge     Assessment   -Superficial Thrombophlebitis  -?Cellulitis     Plan   -d/c IV in the right arm   -start Rocephin 1gm for possible overlying cellulitis   -US duplex RUE ordered  -BCx x2 ordered  -warm compress  -pain management

## 2021-09-26 NOTE — PROGRESS NOTE ADULT - ASSESSMENT
60 yo F as described above presenting with hyperglycemia and abnormal electrolytes concerning for DKA    #Early HHS   -Glucose >600 on admission down to 128 s/p 25 units lantus and 25 units humulin in ED. Unclear why such high dose of insulin was given despite severe potassium of 1.7 and risk of hypoglycemia.   -A1c: 11.0  -on Lantus 25u, will increase tonight based on coverage   -c/w ISS   -severe electrolytes derangement, will monitor and replete PRN  -s/p 3 IV RIders and 80 PO potassium  - K currently 3.6  -tolerating PO , no gap   -monitor BGMS  -Diet consistent carbs.  -cw 40 PO potassium BID  -f/u AM BMP, mag and phos and replete PRN    # Severe Hypokalemia, Hypomagnesemia and Hypophosphatemia   -on admission potassium 1.7  -aggressively replenished, prolonged QTc in AM, no significantly improved, current QTc: 477ms   -f/u BMP, Mg and Phos replete PRN   -potassium currently 3.6  -mg currently 2.0  -phos repleted  -nephrology recs appreciated; f/u urine studies for potassium wasting evaluation, continue to replete potassium until whole body stores are repleted    #ADINA  -likely due to dehydration secondary to osmotic diuresis   -s/p IV   -encourage increase PO intake     #Hyponatremia   -resolved     #Normocytic anemia  - Chronic, possibly related to hx of gastric bypass surgery (2007)  - On iron and B12 supplementation  - Last colonoscopy 2019, normal as per patient    #HTN  - Normotensive  - Hold losartan for now in light of ADINA  - Hold HCTZ due to electrolyte abnormalities  - Monitor BP    #COLLEEN  - On CPAP at bedtime    #COPD  - stable  - Supplemental O2  - Continue inhalers    #Pulmonary HTN  - moderate pulm HTN noted on ECHO (Nov 2018)  - Likely related to underlying COPD, COLLEEN    #DVT PPx  - Heparin SQ    #Advanced Directives  - FULL CODE  - HCP : Daughter Ronit    #Unsafe home environment  -will discuss with SW tomorrow conversation with patient (patient aware) and will discuss possible resources and next steps    *Case discussed with Dr. Sorto  58 yo F as described above presenting with hyperglycemia and abnormal electrolytes concerning for DKA    #Early HHS   -Glucose >600 on admission down to 128 s/p 25 units lantus and 25 units humulin in ED. Unclear why such high dose of insulin was given despite severe potassium of 1.7 and risk of hypoglycemia.   -A1c: 11.0  -on Lantus 25u, will increase tonight based on coverage   -c/w ISS   -severe electrolytes derangement, will monitor and replete PRN  -s/p 3 IV RIders and 80 PO potassium  - K currently 3.6  -tolerating PO, no gap   -monitor BGMS  -Diet consistent carbs.  -cw 40 PO potassium BID  -f/u AM BMP, mag and phos and replete PRN    # Severe Hypokalemia, Hypomagnesemia and Hypophosphatemia   -on admission potassium 1.7  -EKG ordered f/u for QTC  -f/u BMP, Mg and Phos replete PRN   -potassium currently 3.6  -mg currently 2.0  -phos repleted   -nephrology recs appreciated; f/u urine studies for potassium wasting evaluation, continue to replete potassium until whole body stores are replete    #ADINA  -likely due to dehydration secondary to osmotic diuresis   -s/p IV   -encourage increase PO intake     #Hyponatremia   -resolved     #Normocytic anemia  - Chronic, possibly related to hx of gastric bypass surgery (2007)  - On iron and B12 supplementation  - Last colonoscopy 2019, normal as per patient    #HTN  - Normotensive  - Hold losartan for now in light of ADIAN  - Hold HCTZ due to electrolyte abnormalities  - Monitor BP    #COLLEEN  - On CPAP at bedtime    #COPD  - stable  - Supplemental O2  - Continue inhalers    #Pulmonary HTN  - moderate pulm HTN noted on ECHO (Nov 2018)  - Likely related to underlying COPD, COLLEEN    #DVT PPx  - Heparin SQ    #Advanced Directives  - FULL CODE  - HCP : Daughter Ronit    #Unsafe home environment  -will discuss with SW tomorrow conversation with patient (patient aware) and will discuss possible resources and next steps    *Case discussed with Dr. Sorto

## 2021-09-26 NOTE — PROGRESS NOTE ADULT - SUBJECTIVE AND OBJECTIVE BOX
Patient is a 61y old  Female who presents with a chief complaint of Hyperglycemia, Hypokalemia, Hypomagnesemia (23 Sep 2021 20:14)      HPI:  60 yo obese F with PMHx significant for COPD (dependent on 4L O2 via NC at home), pulmonary HTN, IDDM, COLLEEN on CPAP, and HTN BIBEMS from pulmonologist office due to glucose over 600 despite taking insulin.  Patient currently taking metformin 1000mg, proglitazone 45 mg daily and novolin 120 units BID before meals. Patient states that she is compliant but HbA1c noted to be 11.  Was supposed to be seen in the office yesterday for COPD but due to the BG being >600, was sent to  and brought in by ambulance   In the ED, pt glucose >600, K+ 1.7, Mg 1.3, Cr. 1.98,, Ca+ 7.7, Cl- 90, small acetone, PH of 7.33, trace amount of ketone in the urine.  Patient given 25 units of lantus, 25 units of humulin, mag and potassium repleted.   EKG QTc noted to be 649   Repeat Glucose is 158, K 2.1  Pulmonary wise, she has a history of COPD, pulmonary hypertension, COLLEEN      No acute pulmonary events occurred overnight  QTc improving to 476    PAST MEDICAL & SURGICAL HISTORY:  Bipolar 1 disorder    Diabetes mellitus  Type 2    Hypertension    COLLEEN (obstructive sleep apnea)    ADHD (attention deficit hyperactivity disorder)    COPD (chronic obstructive pulmonary disease)    Anemia     delivery delivered  x2    Gastric bypass status for obesity      MEDICATIONS  (STANDING):  ARIPiprazole 30 milliGRAM(s) Oral daily  budesonide  80 MICROgram(s)/formoterol 4.5 MICROgram(s) Inhaler 2 Puff(s) Inhalation two times a day  cefTRIAXone Injectable. 1000 milliGRAM(s) IV Push every 24 hours  citalopram 30 milliGRAM(s) Oral <User Schedule>  cyanocobalamin 500 MICROGram(s) Oral daily  dextrose 40% Gel 15 Gram(s) Oral once  dextrose 5%. 1000 milliLiter(s) (100 mL/Hr) IV Continuous <Continuous>  dextrose 5%. 1000 milliLiter(s) (50 mL/Hr) IV Continuous <Continuous>  dextrose 50% Injectable 25 Gram(s) IV Push once  dextrose 50% Injectable 12.5 Gram(s) IV Push once  dextrose 50% Injectable 25 Gram(s) IV Push once  ferrous    sulfate 325 milliGRAM(s) Oral at bedtime  glucagon  Injectable 1 milliGRAM(s) IntraMuscular once  heparin   Injectable 5000 Unit(s) SubCutaneous every 12 hours  influenza   Vaccine 0.5 milliLiter(s) IntraMuscular once  insulin glargine Injectable (LANTUS) 40 Unit(s) SubCutaneous at bedtime  insulin lispro (ADMELOG) corrective regimen sliding scale   SubCutaneous three times a day before meals  insulin lispro (ADMELOG) corrective regimen sliding scale   SubCutaneous at bedtime  latanoprost 0.005% Ophthalmic Solution 1 Drop(s) Both EYES at bedtime  potassium chloride    Tablet ER 40 milliEquivalent(s) Oral two times a day  risperiDONE   Tablet 2 milliGRAM(s) Oral two times a day    MEDICATIONS  (PRN):  acetaminophen   Tablet .. 650 milliGRAM(s) Oral every 6 hours PRN Mild Pain (1 - 3)  ALBUTerol    90 MICROgram(s) HFA Inhaler 2 Puff(s) Inhalation every 6 hours PRN for shortness of breath and/or wheezing      FAMILY HISTORY:  Family history of leukemia  mother and two cousins, all now     Family history of heart disease  father    Vital Signs Last 24 Hrs  T(C): 37 (25 Sep 2021 06:52), Max: 37.3 (25 Sep 2021 00:36)  T(F): 98.6 (25 Sep 2021 06:52), Max: 99.2 (25 Sep 2021 00:36)  HR: 82 (25 Sep 2021 08:00) (76 - 96)  BP: 129/65 (25 Sep 2021 08:00) (84/40 - 140/86)  BP(mean): 80 (25 Sep 2021 08:00) (50 - 102)  RR: 20 (25 Sep 2021 08:00) (15 - 28)  SpO2: 100% (25 Sep 2021 08:00) (97% - 100%)    I&O's Summary    PHYSICAL EXAM  General Appearance: cooperative, no acute distress,   HEENT: PERRL, conjunctiva clear, EOM's intact, non injected pharynx, no exudate, TM   normal  Neck: Supple, , no adenopathy, thyroid: not enlarged, no carotid bruit or JVD  Back: Symmetric, no  tenderness,no soft tissue tenderness  Lungs: Clear to auscultation bilateral,no adventitious breath sounds, normal   expiratory phase  Heart: Regular rate and rhythm, S1, S2 normal, no murmur, rub or gallop  Abdomen: Soft, non-tender, bowel sounds active , no hepatosplenomegaly  Extremities: no cyanosis or edema, no joint swelling  Skin: Skin color, texture normal, no rashes   Neurologic: Alert and oriented X3 , cranial nerves intact, sensory and motor normal,    ECG:    LABS:                          11.0   9.99  )-----------( 266      ( 23 Sep 2021 15:48 )             34.2     -    139  |  104  |  28<H>  ----------------------------<  157<H>  2.1<LL>   |  30  |  1.25    Ca    7.2<L>      24 Sep 2021 03:21  Phos  1.9       Mg     1.6         TPro  7.1  /  Alb  3.4  /  TBili  0.5  /  DBili  x   /  AST  10<L>  /  ALT  22  /  AlkPhos  86  09-    CARDIAC MARKERS ( 23 Sep 2021 15:48 )  0.039 ng/mL / x     / x     / x     / x              PT/INR - ( 23 Sep 2021 15:48 )   PT: 11.7 sec;   INR: 1.01 ratio         PTT - ( 23 Sep 2021 15:48 )  PTT:26.3 sec  Urinalysis Basic - ( 23 Sep 2021 17:01 )    Color: Yellow / Appearance: Clear / S.010 / pH: x  Gluc: x / Ketone: Trace  / Bili: Negative / Urobili: Negative mg/dL   Blood: x / Protein: 30 mg/dL / Nitrite: Negative   Leuk Esterase: Negative / RBC: 3-5 /HPF / WBC 3-5   Sq Epi: x / Non Sq Epi: Few / Bacteria: Few            RADIOLOGY & ADDITIONAL STUDIES:
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
## 2021-09-27 LAB
ALBUMIN SERPL ELPH-MCNC: 2.7 G/DL — LOW (ref 3.3–5)
ALP SERPL-CCNC: 65 U/L — SIGNIFICANT CHANGE UP (ref 40–120)
ALT FLD-CCNC: 19 U/L — SIGNIFICANT CHANGE UP (ref 12–78)
ANION GAP SERPL CALC-SCNC: 6 MMOL/L — SIGNIFICANT CHANGE UP (ref 5–17)
AST SERPL-CCNC: 7 U/L — LOW (ref 15–37)
BILIRUB SERPL-MCNC: 0.5 MG/DL — SIGNIFICANT CHANGE UP (ref 0.2–1.2)
BUN SERPL-MCNC: 12 MG/DL — SIGNIFICANT CHANGE UP (ref 7–23)
CALCIUM SERPL-MCNC: 7.5 MG/DL — LOW (ref 8.5–10.1)
CHLORIDE SERPL-SCNC: 105 MMOL/L — SIGNIFICANT CHANGE UP (ref 96–108)
CO2 SERPL-SCNC: 28 MMOL/L — SIGNIFICANT CHANGE UP (ref 22–31)
CREAT ?TM UR-MCNC: 40 MG/DL — SIGNIFICANT CHANGE UP
CREAT SERPL-MCNC: 0.91 MG/DL — SIGNIFICANT CHANGE UP (ref 0.5–1.3)
GLUCOSE SERPL-MCNC: 263 MG/DL — HIGH (ref 70–99)
GRAM STN FLD: SIGNIFICANT CHANGE UP
HCT VFR BLD CALC: 29.8 % — LOW (ref 34.5–45)
HGB BLD-MCNC: 9.6 G/DL — LOW (ref 11.5–15.5)
KAPPA LC SER QL IFE: 3.08 MG/DL — HIGH (ref 0.33–1.94)
KAPPA/LAMBDA FREE LIGHT CHAIN RATIO, SERUM: 0.88 RATIO — SIGNIFICANT CHANGE UP (ref 0.26–1.65)
LAMBDA LC SER QL IFE: 3.52 MG/DL — HIGH (ref 0.57–2.63)
MAGNESIUM SERPL-MCNC: 1.5 MG/DL — LOW (ref 1.6–2.6)
MCHC RBC-ENTMCNC: 26.9 PG — LOW (ref 27–34)
MCHC RBC-ENTMCNC: 32.2 GM/DL — SIGNIFICANT CHANGE UP (ref 32–36)
MCV RBC AUTO: 83.5 FL — SIGNIFICANT CHANGE UP (ref 80–100)
METHOD TYPE: SIGNIFICANT CHANGE UP
MSSA DNA SPEC QL NAA+PROBE: SIGNIFICANT CHANGE UP
OSMOLALITY UR: 370 MOSM/KG — SIGNIFICANT CHANGE UP (ref 50–1200)
PHOSPHATE SERPL-MCNC: 1.9 MG/DL — LOW (ref 2.5–4.5)
PLATELET # BLD AUTO: 213 K/UL — SIGNIFICANT CHANGE UP (ref 150–400)
POTASSIUM SERPL-MCNC: 3.1 MMOL/L — LOW (ref 3.5–5.3)
POTASSIUM SERPL-SCNC: 3.1 MMOL/L — LOW (ref 3.5–5.3)
POTASSIUM UR-SCNC: 9.3 MMOL/L — SIGNIFICANT CHANGE UP
PROT SERPL-MCNC: 6.1 GM/DL — SIGNIFICANT CHANGE UP (ref 6–8.3)
RBC # BLD: 3.57 M/UL — LOW (ref 3.8–5.2)
RBC # FLD: 12.5 % — SIGNIFICANT CHANGE UP (ref 10.3–14.5)
SODIUM SERPL-SCNC: 139 MMOL/L — SIGNIFICANT CHANGE UP (ref 135–145)
SPECIMEN SOURCE: SIGNIFICANT CHANGE UP
WBC # BLD: 7.62 K/UL — SIGNIFICANT CHANGE UP (ref 3.8–10.5)
WBC # FLD AUTO: 7.62 K/UL — SIGNIFICANT CHANGE UP (ref 3.8–10.5)

## 2021-09-27 PROCEDURE — 99233 SBSQ HOSP IP/OBS HIGH 50: CPT

## 2021-09-27 RX ORDER — SODIUM,POTASSIUM PHOSPHATES 278-250MG
2 POWDER IN PACKET (EA) ORAL ONCE
Refills: 0 | Status: COMPLETED | OUTPATIENT
Start: 2021-09-27 | End: 2021-09-27

## 2021-09-27 RX ORDER — SODIUM,POTASSIUM PHOSPHATES 278-250MG
1 POWDER IN PACKET (EA) ORAL ONCE
Refills: 0 | Status: COMPLETED | OUTPATIENT
Start: 2021-09-27 | End: 2021-09-27

## 2021-09-27 RX ORDER — MAGNESIUM SULFATE 500 MG/ML
2 VIAL (ML) INJECTION ONCE
Refills: 0 | Status: COMPLETED | OUTPATIENT
Start: 2021-09-27 | End: 2021-09-27

## 2021-09-27 RX ORDER — INSULIN GLARGINE 100 [IU]/ML
30 INJECTION, SOLUTION SUBCUTANEOUS
Refills: 0 | Status: DISCONTINUED | OUTPATIENT
Start: 2021-09-27 | End: 2021-09-30

## 2021-09-27 RX ORDER — POTASSIUM CHLORIDE 20 MEQ
10 PACKET (EA) ORAL
Refills: 0 | Status: COMPLETED | OUTPATIENT
Start: 2021-09-27 | End: 2021-09-27

## 2021-09-27 RX ORDER — VANCOMYCIN HCL 1 G
1000 VIAL (EA) INTRAVENOUS EVERY 12 HOURS
Refills: 0 | Status: DISCONTINUED | OUTPATIENT
Start: 2021-09-27 | End: 2021-09-29

## 2021-09-27 RX ADMIN — Medication 4: at 07:44

## 2021-09-27 RX ADMIN — BUDESONIDE AND FORMOTEROL FUMARATE DIHYDRATE 2 PUFF(S): 160; 4.5 AEROSOL RESPIRATORY (INHALATION) at 20:13

## 2021-09-27 RX ADMIN — Medication 650 MILLIGRAM(S): at 17:14

## 2021-09-27 RX ADMIN — ARIPIPRAZOLE 30 MILLIGRAM(S): 15 TABLET ORAL at 10:15

## 2021-09-27 RX ADMIN — Medication 650 MILLIGRAM(S): at 16:03

## 2021-09-27 RX ADMIN — CITALOPRAM 30 MILLIGRAM(S): 10 TABLET, FILM COATED ORAL at 10:15

## 2021-09-27 RX ADMIN — Medication 1 PACKET(S): at 09:39

## 2021-09-27 RX ADMIN — CITALOPRAM 30 MILLIGRAM(S): 10 TABLET, FILM COATED ORAL at 22:29

## 2021-09-27 RX ADMIN — Medication 50 GRAM(S): at 09:34

## 2021-09-27 RX ADMIN — HEPARIN SODIUM 5000 UNIT(S): 5000 INJECTION INTRAVENOUS; SUBCUTANEOUS at 22:29

## 2021-09-27 RX ADMIN — Medication 250 MILLIGRAM(S): at 22:29

## 2021-09-27 RX ADMIN — Medication 100 MILLIEQUIVALENT(S): at 12:05

## 2021-09-27 RX ADMIN — CEFTRIAXONE 1000 MILLIGRAM(S): 500 INJECTION, POWDER, FOR SOLUTION INTRAMUSCULAR; INTRAVENOUS at 22:30

## 2021-09-27 RX ADMIN — RISPERIDONE 2 MILLIGRAM(S): 4 TABLET ORAL at 22:29

## 2021-09-27 RX ADMIN — RISPERIDONE 2 MILLIGRAM(S): 4 TABLET ORAL at 10:15

## 2021-09-27 RX ADMIN — LATANOPROST 1 DROP(S): 0.05 SOLUTION/ DROPS OPHTHALMIC; TOPICAL at 22:30

## 2021-09-27 RX ADMIN — Medication 100 MILLIEQUIVALENT(S): at 10:51

## 2021-09-27 RX ADMIN — Medication 40 MILLIEQUIVALENT(S): at 10:14

## 2021-09-27 RX ADMIN — ALBUTEROL 2 PUFF(S): 90 AEROSOL, METERED ORAL at 09:05

## 2021-09-27 RX ADMIN — PREGABALIN 500 MICROGRAM(S): 225 CAPSULE ORAL at 10:14

## 2021-09-27 RX ADMIN — INSULIN GLARGINE 20 UNIT(S): 100 INJECTION, SOLUTION SUBCUTANEOUS at 07:45

## 2021-09-27 RX ADMIN — Medication 100 MILLIEQUIVALENT(S): at 13:02

## 2021-09-27 RX ADMIN — INSULIN GLARGINE 30 UNIT(S): 100 INJECTION, SOLUTION SUBCUTANEOUS at 22:29

## 2021-09-27 RX ADMIN — HEPARIN SODIUM 5000 UNIT(S): 5000 INJECTION INTRAVENOUS; SUBCUTANEOUS at 10:14

## 2021-09-27 RX ADMIN — Medication 650 MILLIGRAM(S): at 04:50

## 2021-09-27 RX ADMIN — Medication 8: at 17:13

## 2021-09-27 RX ADMIN — Medication 325 MILLIGRAM(S): at 22:29

## 2021-09-27 RX ADMIN — Medication 6: at 12:36

## 2021-09-27 RX ADMIN — Medication 2: at 22:30

## 2021-09-27 RX ADMIN — BUDESONIDE AND FORMOTEROL FUMARATE DIHYDRATE 2 PUFF(S): 160; 4.5 AEROSOL RESPIRATORY (INHALATION) at 09:06

## 2021-09-27 NOTE — PROVIDER CONTACT NOTE (CRITICAL VALUE NOTIFICATION) - TEST AND RESULT REPORTED:
K 2.5
K 2.6
potassium 1.8
+BC in Anaerobic bottle, Gram +cocci in clusters
K 2.0
K 2.2
K 2.2
K+ 2.1
K 2.8

## 2021-09-27 NOTE — PROGRESS NOTE ADULT - SUBJECTIVE AND OBJECTIVE BOX
Patient is a 61y Female who reports no complaints as new    MEDICATIONS  (STANDING):  ARIPiprazole 30 milliGRAM(s) Oral daily  budesonide  80 MICROgram(s)/formoterol 4.5 MICROgram(s) Inhaler 2 Puff(s) Inhalation two times a day  cefTRIAXone Injectable. 1000 milliGRAM(s) IV Push every 24 hours  citalopram 30 milliGRAM(s) Oral <User Schedule>  cyanocobalamin 500 MICROGram(s) Oral daily  dextrose 40% Gel 15 Gram(s) Oral once  dextrose 5%. 1000 milliLiter(s) (50 mL/Hr) IV Continuous <Continuous>  dextrose 5%. 1000 milliLiter(s) (100 mL/Hr) IV Continuous <Continuous>  dextrose 50% Injectable 25 Gram(s) IV Push once  dextrose 50% Injectable 25 Gram(s) IV Push once  dextrose 50% Injectable 12.5 Gram(s) IV Push once  ferrous    sulfate 325 milliGRAM(s) Oral at bedtime  glucagon  Injectable 1 milliGRAM(s) IntraMuscular once  heparin   Injectable 5000 Unit(s) SubCutaneous every 12 hours  influenza   Vaccine 0.5 milliLiter(s) IntraMuscular once  insulin glargine Injectable (LANTUS) 20 Unit(s) SubCutaneous two times a day  insulin lispro (ADMELOG) corrective regimen sliding scale   SubCutaneous three times a day before meals  insulin lispro (ADMELOG) corrective regimen sliding scale   SubCutaneous at bedtime  latanoprost 0.005% Ophthalmic Solution 1 Drop(s) Both EYES at bedtime  potassium chloride    Tablet ER 40 milliEquivalent(s) Oral two times a day  potassium chloride  10 mEq/100 mL IVPB 10 milliEquivalent(s) IV Intermittent every 1 hour  risperiDONE   Tablet 2 milliGRAM(s) Oral two times a day    MEDICATIONS  (PRN):  acetaminophen   Tablet .. 650 milliGRAM(s) Oral every 6 hours PRN Mild Pain (1 - 3)  acetaminophen   Tablet .. 650 milliGRAM(s) Oral every 6 hours PRN Temp greater or equal to 38C (100.4F)  ALBUTerol    90 MICROgram(s) HFA Inhaler 2 Puff(s) Inhalation every 6 hours PRN for shortness of breath and/or wheezing        T(C): , Max: 38.3 (09-27-21 @ 04:35)  T(F): , Max: 100.9 (09-27-21 @ 04:35)  HR: 84 (09-27-21 @ 09:09)  BP: 136/79 (09-27-21 @ 08:00)  BP(mean): 90 (09-27-21 @ 08:00)  RR: 19 (09-27-21 @ 08:00)  SpO2: 95% (09-27-21 @ 09:09)  Wt(kg): --    09-26 @ 07:01  -  09-27 @ 07:00  --------------------------------------------------------  IN: 0 mL / OUT: 1 mL / NET: -1 mL          PHYSICAL EXAM:    Constitutional: NAD, well-groomed, well-developed  HEENT: PERRLA, EOMI,  MMM  Neck: No LAD, No JVD  Respiratory: CTAB  Cardiovascular: S1 and S2, RRR  Gastrointestinal: BS+, soft, NT/ND  Extremities: tr peripheral edema  Neurological: A/O x 3, no focal deficits  Psychiatric: Normal mood, normal affect  : No David  Skin: No rashes  Access: Not applicable        LABS:                        9.6    7.62  )-----------( 213      ( 27 Sep 2021 05:48 )             29.8     27 Sep 2021 05:48    139    |  105    |  12     ----------------------------<  263    3.1     |  28     |  0.91   26 Sep 2021 15:06    141    |  107    |  12     ----------------------------<  241    3.6     |  31     |  1.18   26 Sep 2021 06:39    140    |  106    |  12     ----------------------------<  393    2.6     |  27     |  1.06   25 Sep 2021 18:02    138    |  104    |  10     ----------------------------<  361    3.3     |  29     |  1.28   25 Sep 2021 12:33    138    |  106    |  8      ----------------------------<  243    2.8     |  27     |  1.08     Ca    7.5        27 Sep 2021 05:48  Ca    7.6        26 Sep 2021 15:06  Ca    7.3        26 Sep 2021 06:39  Ca    7.4        25 Sep 2021 18:02  Ca    7.4        25 Sep 2021 12:33  Phos  1.9       27 Sep 2021 05:48  Phos  1.8       26 Sep 2021 15:06  Phos  2.2       26 Sep 2021 06:39  Phos  2.0       25 Sep 2021 18:02  Phos  1.5       25 Sep 2021 12:33  Mg     1.5       27 Sep 2021 05:48  Mg     2.0       26 Sep 2021 15:06  Mg     1.5       26 Sep 2021 06:39  Mg     1.6       25 Sep 2021 18:02  Mg     1.5       25 Sep 2021 12:33    TPro  6.1    /  Alb  2.7    /  TBili  0.5    /  DBili  x      /  AST  7      /  ALT  19     /  AlkPhos  65     27 Sep 2021 05:48  TPro  6.0    /  Alb  2.7    /  TBili  0.5    /  DBili  x      /  AST  7      /  ALT  19     /  AlkPhos  68     26 Sep 2021 06:39  TPro  6.1    /  Alb  2.8    /  TBili  0.4    /  DBili  x      /  AST  11     /  ALT  19     /  AlkPhos  65     24 Sep 2021 17:27  TPro  7.1    /  Alb  3.4    /  TBili  0.5    /  DBili  x      /  AST  10     /  ALT  22     /  AlkPhos  86     23 Sep 2021 15:48          Urine Studies:    Creatinine, Random Urine: 40 mg/dL (09-26 @ 18:35)  Potassium, Random Urine: 9.3 mmol/L (09-26 @ 18:35)  Creatinine, Random Urine: 35 mg/dL (09-26 @ 18:35)  Protein/Creatinine Ratio Calculation: 0.7 Ratio (09-26 @ 18:35)        RADIOLOGY & ADDITIONAL STUDIES:

## 2021-09-27 NOTE — PROVIDER CONTACT NOTE (CRITICAL VALUE NOTIFICATION) - PERSON GIVING RESULT:
Lab- [facility]
Lab- Anh
Armando Campbell
Lab- [facility]
jamarcus
Colten
Lab - Tom
Lab- Anh
Lab/Vivienne

## 2021-09-27 NOTE — PROGRESS NOTE ADULT - ASSESSMENT
60 yo obese F with PMHx significant for COPD (dependent on 4L O2 via NC at home), pulmonary HTN, IDDM, COLLEEN on CPAP, and HTN resenting with hyperglycemia and abnormal electrolytes concerning for DKA/HHS    #Hyperglycemia, early HHS  -Glucose >600 on admission down to 128 s/p 25 units lantus and 25 units humulin in ED  -A1c: 11.0  -Blood sugars ranging 200s-300s currently  -On Lantus 20 units BID, increase to 30 units BID starting tonight  -Continue correctional insulin sliding scale   -Severe electrolytes derangement, will monitor and replete PRN  - K 3.6 -> 3.1 this morning, repleted; Mg repleted  -Continue 40 PO potassium BID  -Carb controlled diet, diabetic teaching    # Severe Hypokalemia, Hypomagnesemia and Hypophosphatemia   -This am, K 3.1, Mg 1.5, Phos 1.9 - repleted  -Follow daily BMP, Mg and Phos replete PRN  -Nephrology recs appreciated; f/u urine studies for potassium wasting evaluation, continue to replete potassium until whole body stores are replete    #Staph Aureus Bacteremia 2/2 infected IV line  -Puss noted on removal of R AC IV line  -Blood cultures positive for Staph Aureus  -On Ceftriaxone and Vancomycin started today, continue  -ID following, recs appreciated  -Repeat blood cultures pending, follow up    #Unsafe home environment, Depression, Anxiety  -Patient takes multiple psychiatric mediations: Risperdal Abilify, Celexa  -On PRN Xanax at home up to Q6, states she takes more some days than others, has not gotten it here but has not asked for it- states she feels safe here   -No benzo withdrawal symptoms noted  -SW consulted to discuss possible resources and next steps  -Psych consulted    #ADINA, resolved  -Likely due to dehydration secondary to osmotic diuresis, now s/p IVF resucitation  -Monitor    #Hyponatremia   -Resolved, continue to monitor    #Normocytic anemia  - Chronic, possibly related to hx of gastric bypass surgery (2007)  - On iron and B12 supplementation  - Last colonoscopy 2019, normal as per patient    #HTN  - Normotensive  - Have been holding Losartan and HCTZ due to ADINA and electrolyte abnormalities on admission   - Monitor BP off medication for now as patients BP remains within normal range    #COLLEEN  - Continue CPAP at bedtime    #COPD, Pulmonary HTN  - Stable  - moderate pulm HTN noted on ECHO (Nov 2018)  - Likely related to underlying COPD, COLLEEN  - Continue home inhalers    #DVT PPx  - Heparin SQ    #Advanced Directives  - Full Code  - HCP : Daughter Ronit

## 2021-09-27 NOTE — CONSULT NOTE ADULT - ASSESSMENT
62 y/o obese Female with h/o COPD (dependent on 4L O2 via NC at home), pulmonary HTN, IDDM, COLLEEN on CPAP, and HTN was admitted on 9/23 from pulmonologist office due to glucose over 600 despite taking insulin. Patient was on metformin 1000mg, proglitazone 45 mg daily and novolin 120 units BID before meals. Patients states that for the past month her glucose levels have been similar to the  glucose on admission and has been experiencing generalized weakness, lethargy, unbalance, xerostomia, polydipsia, polyuria and intermittent palpitations. Patient was noted with phlebitis and BC from 9/25 were reported with bacteremia. She received ceftriaxone IV.     1. Sepsis with STAU. Right forearm cellulitis and phlebitis.      60 y/o obese Female with h/o COPD (dependent on 4L O2 via NC at home), pulmonary HTN, IDDM, COLLEEN on CPAP, and HTN was admitted on 9/23 from pulmonologist office due to glucose over 600 despite taking insulin. Patient was on metformin 1000mg, proglitazone 45 mg daily and novolin 120 units BID before meals. Patients states that for the past month her glucose levels have been similar to the  glucose on admission and has been experiencing generalized weakness, lethargy, unbalance, xerostomia, polydipsia, polyuria and intermittent palpitations. Patient was noted with phlebitis and BC from 9/25 were reported with bacteremia. She received ceftriaxone IV.     1. Sepsis with STAU. Right forearm cellulitis and phlebitis. Obesity. DM type 2 poorly controlled.  -low grade fever  -right arm phlebitis site culture collected  -BC x 2 noted  -start vancomycin 1 mg IV q12h and ceftriaxone 1 gm IV qd  -reason for abx use and side effects reviewed with patient; monitor BMP and vancomycin trough levels   -local wound care  -old chart reviewed to assess prior cultures  -monitor temps  -f/u CBC  -supportive care  2. Other issues:   -care per medicine    d/w Dr. MARIANNE Tolentino

## 2021-09-27 NOTE — PROGRESS NOTE ADULT - ASSESSMENT
61 COPD (dependent on 4L O2 via NC at home), pulmonary HTN, IDDM, COLLEEN on CPAP, and HTN BIBEMS from pulmonologist office due to glucose over 600 despite taking insulin, renal evaluation of hypokalemia/lyte abnormalities.  Patient currently taking metformin 1000mg, proglitazone 45 mg daily and novolin 120 units BID before meals. Patient reportedly with poor diet at home, manipulated/controlled by spouse as per RN staff  . Patient has not been sick recently, no sick contacts and denies alcohol/smoking/recreational drugs. Recieving k/mg here, she is on a thiazide type diuretic at home denies loop or gi losses.     Hypokalemia/Lyte abnormalities  -Whole body deficit with poor po, chronic thiazide  -Urine studies ordered for potassium wasting evaluation  -Pending stephanie, light chains to assess for potential proximal tubular entities that could lead to wasting, hypoalb also noted  -Mg repletion is key, IV k ordered as well  -Can give IV K phos if phos  is low again in AM     Uncontrolled glucose  -Medicine optimizing  -Nutrition  -SW    D/c with rn staff, Dr Mon

## 2021-09-27 NOTE — CONSULT NOTE ADULT - REASON FOR ADMISSION
Hyperglycemia, Hypokalemia, Hypomagnesemia

## 2021-09-27 NOTE — CONSULT NOTE ADULT - SUBJECTIVE AND OBJECTIVE BOX
Patient is a 61y old  Female who presents with a chief complaint of Hyperglycemia, Hypokalemia, Hypomagnesemia    HPI:  62 y/o obese Female with h/o COPD (dependent on 4L O2 via NC at home), pulmonary HTN, IDDM, COLLEEN on CPAP, and HTN was admitted on  from pulmonologist office due to glucose over 600 despite taking insulin. Patient was on metformin 1000mg, proglitazone 45 mg daily and novolin 120 units BID before meals. Patients states that for the past month her glucose levels have been similar to the  glucose on admission and has been experiencing generalized weakness, lethargy, unbalance, xerostomia, polydipsia, polyuria and intermittent palpitations. Patient was noted with phlebitis and BC from  were reported with bacteremia. She received ceftriaxone IV.     PMH: as above  PSH: as above  Meds: per reconciliation sheet, noted below  MEDICATIONS  (STANDING):  ARIPiprazole 30 milliGRAM(s) Oral daily  budesonide  80 MICROgram(s)/formoterol 4.5 MICROgram(s) Inhaler 2 Puff(s) Inhalation two times a day  cefTRIAXone Injectable. 1000 milliGRAM(s) IV Push every 24 hours  citalopram 30 milliGRAM(s) Oral <User Schedule>  cyanocobalamin 500 MICROGram(s) Oral daily  dextrose 40% Gel 15 Gram(s) Oral once  dextrose 5%. 1000 milliLiter(s) (100 mL/Hr) IV Continuous <Continuous>  dextrose 5%. 1000 milliLiter(s) (50 mL/Hr) IV Continuous <Continuous>  dextrose 50% Injectable 25 Gram(s) IV Push once  dextrose 50% Injectable 12.5 Gram(s) IV Push once  dextrose 50% Injectable 25 Gram(s) IV Push once  ferrous    sulfate 325 milliGRAM(s) Oral at bedtime  glucagon  Injectable 1 milliGRAM(s) IntraMuscular once  heparin   Injectable 5000 Unit(s) SubCutaneous every 12 hours  influenza   Vaccine 0.5 milliLiter(s) IntraMuscular once  insulin glargine Injectable (LANTUS) 20 Unit(s) SubCutaneous two times a day  insulin lispro (ADMELOG) corrective regimen sliding scale   SubCutaneous three times a day before meals  insulin lispro (ADMELOG) corrective regimen sliding scale   SubCutaneous at bedtime  latanoprost 0.005% Ophthalmic Solution 1 Drop(s) Both EYES at bedtime  risperiDONE   Tablet 2 milliGRAM(s) Oral two times a day    MEDICATIONS  (PRN):  acetaminophen   Tablet .. 650 milliGRAM(s) Oral every 6 hours PRN Mild Pain (1 - 3)  acetaminophen   Tablet .. 650 milliGRAM(s) Oral every 6 hours PRN Temp greater or equal to 38C (100.4F)  ALBUTerol    90 MICROgram(s) HFA Inhaler 2 Puff(s) Inhalation every 6 hours PRN for shortness of breath and/or wheezing    Allergies    celery allergy (Faint)  No Known Drug Allergies    Intolerances      Social: no smoking, no alcohol, no illegal drugs; no recent travel, no exposure to TB  FAMILY HISTORY:  Family history of leukemia  mother and two cousins, all now     Family history of heart disease  father      no history of premature cardiovascular disease in first degree relatives    ROS: the patient denies fever, no chills, no HA, no seizures, no dizziness, no sore throat, no nasal congestion, no blurry vision, no CP, no palpitations, no SOB, no cough, no abdominal pain, no diarrhea, no N/V, no dysuria, no leg pain, no claudication, has arm rash, no joint aches, no rectal pain or bleeding, no night sweats  All other systems reviewed and are negative    Vital Signs Last 24 Hrs  T(C): 37.2 (27 Sep 2021 12:00), Max: 38.3 (27 Sep 2021 04:35)  T(F): 98.9 (27 Sep 2021 12:00), Max: 100.9 (27 Sep 2021 04:35)  HR: 84 (27 Sep 2021 14:00) (78 - 106)  BP: 128/78 (27 Sep 2021 14:00) (95/51 - 148/84)  BP(mean): 91 (27 Sep 2021 14:00) (60 - 112)  RR: 31 (27 Sep 2021 14:00) (9 - 38)  SpO2: 100% (27 Sep 2021 14:00) (91% - 100%)  Daily     Daily Weight in k.6 (27 Sep 2021 04:35)    PE:    Constitutional:  No acute distress  HEENT: NC/AT, EOMI, PERRLA, conjunctivae clear; ears and nose atraumatic; pharynx benign  Neck: supple; thyroid not palpable  Back: no tenderness  Respiratory: respiratory effort normal; clear to auscultation  Cardiovascular: S1S2 regular, no murmurs  Abdomen: soft, not tender, not distended, positive BS; no liver or spleen organomegaly  Genitourinary: no suprapubic tenderness  Lymphatic: no LN palpable  Musculoskeletal: no muscle tenderness, no joint swelling or tenderness  Extremities: no pedal edema  Neurological/ Psychiatric: AxOx3, judgement and insight normal; moving all extremities  Skin: no rashes; no palpable lesions    Labs: all available labs reviewed                        9.6    7.62  )-----------( 213      ( 27 Sep 2021 05:48 )             29.8         139  |  105  |  12  ----------------------------<  263<H>  3.1<L>   |  28  |  0.91    Ca    7.5<L>      27 Sep 2021 05:48  Phos  1.9       Mg     1.5         TPro  6.1  /  Alb  2.7<L>  /  TBili  0.5  /  DBili  x   /  AST  7<L>  /  ALT  19  /  AlkPhos  65  09-27     LIVER FUNCTIONS - ( 27 Sep 2021 05:48 )  Alb: 2.7 g/dL / Pro: 6.1 gm/dL / ALK PHOS: 65 U/L / ALT: 19 U/L / AST: 7 U/L / GGT: x           COVID-19 PCR: NotDetec (21 @ 15:48)      Culture - Blood (collected 25 Sep 2021 18:02)  Source: .Blood Blood-Peripheral  Gram Stain (27 Sep 2021 05:54):    Growth in anaerobic bottle: Gram Positive Cocci in Clusters  Preliminary Report (27 Sep 2021 05:54):    Growth in anaerobic bottle: Gram Positive Cocci in Clusters  Organism: Blood Culture PCR (27 Sep 2021 07:31)      -  Staphylococcus aureus: Detec Any isolate of Staphylococcus aureus from a blood culture is NOT considered a contaminant.      Method Type: PCR    Culture - Blood (collected 25 Sep 2021 18:02)  Source: .Blood Blood-Peripheral  Preliminary Report (27 Sep 2021 07:01):    No growth to date.    Culture - Urine (collected 23 Sep 2021 17:01)  Source: Clean Catch None  Final Report (25 Sep 2021 01:06):    <10,000 CFU/mL Normal Urogenital Hawa    Radiology: all available radiological tests reviewed    < from: US Duplex Venous Upper Ext Ltd, Right (21 @ 09:24) >  No evidence of right upper extremity deep venous thrombosis.  Thrombosis of the right cephalic vein in the antecubital fossa  Suggestion of edema in the subcutaneous fat at the level of thrombosis, correlate for cellulitis.  < end of copied text >      Advanced directives addressed: full resuscitation Patient is a 61y old  Female who presents with a chief complaint of Hyperglycemia, Hypokalemia, Hypomagnesemia    HPI:  62 y/o obese Female with h/o COPD (dependent on 4L O2 via NC at home), pulmonary HTN, IDDM, COLLEEN on CPAP, and HTN was admitted on  from pulmonologist office due to glucose over 600 despite taking insulin. Patient was on metformin 1000mg, proglitazone 45 mg daily and novolin 120 units BID before meals. Patients states that for the past month her glucose levels have been similar to the  glucose on admission and has been experiencing generalized weakness, lethargy, unbalance, xerostomia, polydipsia, polyuria and intermittent palpitations. Patient was noted with with right arm phlebitis and BC from  were reported with bacteremia. She received ceftriaxone IV.     PMH: as above  PSH: as above  Meds: per reconciliation sheet, noted below  MEDICATIONS  (STANDING):  ARIPiprazole 30 milliGRAM(s) Oral daily  budesonide  80 MICROgram(s)/formoterol 4.5 MICROgram(s) Inhaler 2 Puff(s) Inhalation two times a day  cefTRIAXone Injectable. 1000 milliGRAM(s) IV Push every 24 hours  citalopram 30 milliGRAM(s) Oral <User Schedule>  cyanocobalamin 500 MICROGram(s) Oral daily  dextrose 40% Gel 15 Gram(s) Oral once  dextrose 5%. 1000 milliLiter(s) (100 mL/Hr) IV Continuous <Continuous>  dextrose 5%. 1000 milliLiter(s) (50 mL/Hr) IV Continuous <Continuous>  dextrose 50% Injectable 25 Gram(s) IV Push once  dextrose 50% Injectable 12.5 Gram(s) IV Push once  dextrose 50% Injectable 25 Gram(s) IV Push once  ferrous    sulfate 325 milliGRAM(s) Oral at bedtime  glucagon  Injectable 1 milliGRAM(s) IntraMuscular once  heparin   Injectable 5000 Unit(s) SubCutaneous every 12 hours  influenza   Vaccine 0.5 milliLiter(s) IntraMuscular once  insulin glargine Injectable (LANTUS) 20 Unit(s) SubCutaneous two times a day  insulin lispro (ADMELOG) corrective regimen sliding scale   SubCutaneous three times a day before meals  insulin lispro (ADMELOG) corrective regimen sliding scale   SubCutaneous at bedtime  latanoprost 0.005% Ophthalmic Solution 1 Drop(s) Both EYES at bedtime  risperiDONE   Tablet 2 milliGRAM(s) Oral two times a day    MEDICATIONS  (PRN):  acetaminophen   Tablet .. 650 milliGRAM(s) Oral every 6 hours PRN Mild Pain (1 - 3)  acetaminophen   Tablet .. 650 milliGRAM(s) Oral every 6 hours PRN Temp greater or equal to 38C (100.4F)  ALBUTerol    90 MICROgram(s) HFA Inhaler 2 Puff(s) Inhalation every 6 hours PRN for shortness of breath and/or wheezing    Allergies    celery allergy (Faint)  No Known Drug Allergies    Intolerances      Social: no smoking, no alcohol, no illegal drugs; no recent travel, no exposure to TB  FAMILY HISTORY:  Family history of leukemia  mother and two cousins, all now     Family history of heart disease  father      no history of premature cardiovascular disease in first degree relatives    ROS: the patient denies fever, no chills, no HA, no seizures, no dizziness, no sore throat, no nasal congestion, no blurry vision, no CP, no palpitations, no SOB, no cough, no abdominal pain, no diarrhea, no N/V, no dysuria, no leg pain, no claudication, has arm rash, no joint aches, no rectal pain or bleeding, no night sweats  All other systems reviewed and are negative    Vital Signs Last 24 Hrs  T(C): 37.2 (27 Sep 2021 12:00), Max: 38.3 (27 Sep 2021 04:35)  T(F): 98.9 (27 Sep 2021 12:00), Max: 100.9 (27 Sep 2021 04:35)  HR: 84 (27 Sep 2021 14:00) (78 - 106)  BP: 128/78 (27 Sep 2021 14:00) (95/51 - 148/84)  BP(mean): 91 (27 Sep 2021 14:00) (60 - 112)  RR: 31 (27 Sep 2021 14:00) (9 - 38)  SpO2: 100% (27 Sep 2021 14:00) (91% - 100%)  Daily     Daily Weight in k.6 (27 Sep 2021 04:35)    PE:    Constitutional:  No acute distress  HEENT: NC/AT, EOMI, PERRLA, conjunctivae clear; ears and nose atraumatic; pharynx benign  Neck: supple; thyroid not palpable  Back: no tenderness  Respiratory: respiratory effort normal; clear to auscultation  Cardiovascular: S1S2 regular, no murmurs  Abdomen: soft, not tender, not distended, positive BS; no liver or spleen organomegaly  Genitourinary: no suprapubic tenderness  Lymphatic: no LN palpable  Musculoskeletal: no muscle tenderness, no joint swelling or tenderness  Extremities: no pedal edema  Right antecubital area of erythema, edema; phlebitis with scant discharge  Neurological/ Psychiatric: AxOx3, judgement and insight normal; moving all extremities  Skin: no rashes; no palpable lesions    Labs: all available labs reviewed                        9.6    7.62  )-----------( 213      ( 27 Sep 2021 05:48 )             29.8         139  |  105  |  12  ----------------------------<  263<H>  3.1<L>   |  28  |  0.91    Ca    7.5<L>      27 Sep 2021 05:48  Phos  1.9       Mg     1.5         TPro  6.1  /  Alb  2.7<L>  /  TBili  0.5  /  DBili  x   /  AST  7<L>  /  ALT  19  /  AlkPhos  65  0927     LIVER FUNCTIONS - ( 27 Sep 2021 05:48 )  Alb: 2.7 g/dL / Pro: 6.1 gm/dL / ALK PHOS: 65 U/L / ALT: 19 U/L / AST: 7 U/L / GGT: x           COVID-19 PCR: NotDete (21 @ 15:48)      Culture - Blood (collected 25 Sep 2021 18:02)  Source: .Blood Blood-Peripheral  Gram Stain (27 Sep 2021 05:54):    Growth in anaerobic bottle: Gram Positive Cocci in Clusters  Preliminary Report (27 Sep 2021 05:54):    Growth in anaerobic bottle: Gram Positive Cocci in Clusters  Organism: Blood Culture PCR (27 Sep 2021 07:31)      -  Staphylococcus aureus: Detec Any isolate of Staphylococcus aureus from a blood culture is NOT considered a contaminant.      Method Type: PCR    Culture - Blood (collected 25 Sep 2021 18:02)  Source: .Blood Blood-Peripheral  Preliminary Report (27 Sep 2021 07:01):    No growth to date.    Culture - Urine (collected 23 Sep 2021 17:01)  Source: Clean Catch None  Final Report (25 Sep 2021 01:06):    <10,000 CFU/mL Normal Urogenital Hawa    Radiology: all available radiological tests reviewed    < from: US Duplex Venous Upper Ext Ltd, Right (21 @ 09:24) >  No evidence of right upper extremity deep venous thrombosis.  Thrombosis of the right cephalic vein in the antecubital fossa  Suggestion of edema in the subcutaneous fat at the level of thrombosis, correlate for cellulitis.  < end of copied text >      Advanced directives addressed: full resuscitation

## 2021-09-27 NOTE — PROGRESS NOTE ADULT - NSPROGADDITIONALINFOA_GEN_ALL_CORE
Pt seen and examined with FMRODERICK Viera. A&P reviewed.   Lantus adjusted  Psych eval - Hx of xanax large doses use and BPD on meds  STAU bacteremia due to infected phlebitis - IV vanco, repeat BCx  Time spent 58 min

## 2021-09-27 NOTE — PROGRESS NOTE ADULT - SUBJECTIVE AND OBJECTIVE BOX
62 yo obese F with PMHx significant for COPD (dependent on 4L O2 via NC at home), pulmonary HTN, IDDM, COLLEEN on CPAP, and HTN BIBEMS from pulmonologist office due to glucose over 600 despite taking insulin.  Patient currently taking metformin 1000mg, proglitazone 45 mg daily and novolin 120 units BID before meals. Patient is adherent to medication regimen. Patients states that for the past month her glucose levels have been similar to the  glucose on admission and has been experiencing generalized weakness, lethargy, unbalance, xerostomia, polydipsia, polyuria and intermittent palpitations.    Seen and examined this morning, patient sitting up comfortably in the chair. No overnight events. States she is feeling well, she gets upset when she hears her potassium has dropped. She states she feels very safe here and is happy that everyone has been so kind to her. She endorses some pain at the site of the IV removed yesterday but does not endorse other complaints. Diabetic management was reviewed with the patient. She reports compliance and that she will be getting a new endocrinologist upon discharge.    Vital Signs Last 24 Hrs  T(C): 37.2 (27 Sep 2021 12:00), Max: 38.3 (27 Sep 2021 04:35)  T(F): 98.9 (27 Sep 2021 12:00), Max: 100.9 (27 Sep 2021 04:35)  HR: 82 (27 Sep 2021 15:00) (78 - 106)  BP: 127/78 (27 Sep 2021 15:00) (95/51 - 148/84)  BP(mean): 90 (27 Sep 2021 15:00) (60 - 112)  RR: 30 (27 Sep 2021 15:00) (9 - 38)  SpO2: 97% (27 Sep 2021 15:00) (91% - 100%)    REVIEW OF SYSTEMS  General: denies fever, chills  Respiratory and Thorax: no difficulty breathing or chest pain  Cardiovascular: no palpitations or PND, orthopnea  Gastrointestinal: no abdominal pain, normal bowel movement, no dark stools  Genitourinary: no difficulty urinating, no burning urination, endorses urinary frequency  Musculoskeletal: no myalgia/arthrlagia  Neurological: no weakness, numbness, change in gait  Psychiatric: + depression, anxiety  Endocrine: endorses polyuria, polydipsia    PHYSICAL EXAM:    General: Obese, age appearing female; in no acute distress  Head: Normocephalic; atraumatic  Respiratory: No wheezes, rales or rhonchi  Cardiovascular: Regular rate and rhythm. S1 and S2 Normal; No murmurs, gallops or rubs  Gastrointestinal: Soft non-tender non-distended; Normal bowel sounds  Extremities: No clubbing, cyanosis or edema  Vascular: Peripheral pulses palpable 2+ bilaterally  Neurological: Alert and oriented x4  Skin: Warm and dry. No acute rash  Musculoskeletal: Normal gait, tone, without deformities  Psychiatric: Cooperative and appropriate 60 yo obese F with PMHx significant for COPD (dependent on 4L O2 via NC at home), pulmonary HTN, IDDM, COLLEEN on CPAP, and HTN BIBEMS from pulmonologist office due to glucose over 600 despite taking insulin.  Patient currently taking metformin 1000mg, proglitazone 45 mg daily and novolin 120 units BID before meals. Patient is adherent to medication regimen. Patients states that for the past month her glucose levels have been similar to the  glucose on admission and has been experiencing generalized weakness, lethargy, unbalance, xerostomia, polydipsia, polyuria and intermittent palpitations.    Seen and examined this morning, patient sitting up comfortably in the chair. No overnight events. States she is feeling well, she gets upset when she hears her potassium has dropped. She states she feels very safe here and is happy that everyone has been so kind to her. She endorses some pain at the site of the IV removed yesterday but does not endorse other complaints. Diabetic management was reviewed with the patient. She reports compliance and that she will be getting a new endocrinologist upon discharge.    Vital Signs Last 24 Hrs  T(C): 37.2 (27 Sep 2021 12:00), Max: 38.3 (27 Sep 2021 04:35)  T(F): 98.9 (27 Sep 2021 12:00), Max: 100.9 (27 Sep 2021 04:35)  HR: 82 (27 Sep 2021 15:00) (78 - 106)  BP: 127/78 (27 Sep 2021 15:00) (95/51 - 148/84)  BP(mean): 90 (27 Sep 2021 15:00) (60 - 112)  RR: 30 (27 Sep 2021 15:00) (9 - 38)  SpO2: 97% (27 Sep 2021 15:00) (91% - 100%)    REVIEW OF SYSTEMS  General: denies fever, chills  Respiratory and Thorax: no difficulty breathing or chest pain  Cardiovascular: no palpitations or PND, orthopnea  Gastrointestinal: no abdominal pain, normal bowel movement, no dark stools  Genitourinary: no difficulty urinating, no burning urination, endorses urinary frequency  Musculoskeletal: no myalgia/arthrlagia  Neurological: no weakness, numbness, change in gait  Psychiatric: + depression, anxiety  Endocrine: endorses polyuria, polydipsia    PHYSICAL EXAM:    General: Obese, age appearing female; in no acute distress  Head: Normocephalic; atraumatic  Respiratory: No wheezes, rales or rhonchi  Cardiovascular: Regular rate and rhythm. S1 and S2 Normal; No murmurs, gallops or rubs  Gastrointestinal: Soft non-tender non-distended; Normal bowel sounds  Extremities: No clubbing, cyanosis or edema  Vascular: Peripheral pulses palpable 2+ bilaterally  Neurological: Alert and oriented x4  Skin: Warm and dry. No acute rash  Musculoskeletal: Normal gait, tone, without deformities  Psychiatric: Cooperative and appropriate                              9.6    7.62  )-----------( 213      ( 27 Sep 2021 05:48 )             29.8     27 Sep 2021 05:48    139    |  105    |  12     ----------------------------<  263    3.1     |  28     |  0.91     Ca    7.5        27 Sep 2021 05:48  Phos  1.9       27 Sep 2021 05:48  Mg     1.5       27 Sep 2021 05:48    TPro  6.1    /  Alb  2.7    /  TBili  0.5    /  DBili  x      /  AST  7      /  ALT  19     /  AlkPhos  65     27 Sep 2021 05:48      CAPILLARY BLOOD GLUCOSE      POCT Blood Glucose.: 312 mg/dL (27 Sep 2021 17:06)  POCT Blood Glucose.: 263 mg/dL (27 Sep 2021 12:34)  POCT Blood Glucose.: 248 mg/dL (27 Sep 2021 07:38)  POCT Blood Glucose.: 348 mg/dL (27 Sep 2021 00:14)  POCT Blood Glucose.: 389 mg/dL (26 Sep 2021 20:50)    LIVER FUNCTIONS - ( 27 Sep 2021 05:48 )  Alb: 2.7 g/dL / Pro: 6.1 gm/dL / ALK PHOS: 65 U/L / ALT: 19 U/L / AST: 7 U/L / GGT: x             MEDICATIONS  (STANDING):  ARIPiprazole 30 milliGRAM(s) Oral daily  budesonide  80 MICROgram(s)/formoterol 4.5 MICROgram(s) Inhaler 2 Puff(s) Inhalation two times a day  cefTRIAXone Injectable. 1000 milliGRAM(s) IV Push every 24 hours  citalopram 30 milliGRAM(s) Oral <User Schedule>  cyanocobalamin 500 MICROGram(s) Oral daily  dextrose 40% Gel 15 Gram(s) Oral once  dextrose 5%. 1000 milliLiter(s) (50 mL/Hr) IV Continuous <Continuous>  dextrose 5%. 1000 milliLiter(s) (100 mL/Hr) IV Continuous <Continuous>  dextrose 50% Injectable 25 Gram(s) IV Push once  dextrose 50% Injectable 12.5 Gram(s) IV Push once  dextrose 50% Injectable 25 Gram(s) IV Push once  ferrous    sulfate 325 milliGRAM(s) Oral at bedtime  glucagon  Injectable 1 milliGRAM(s) IntraMuscular once  heparin   Injectable 5000 Unit(s) SubCutaneous every 12 hours  influenza   Vaccine 0.5 milliLiter(s) IntraMuscular once  insulin glargine Injectable (LANTUS) 30 Unit(s) SubCutaneous two times a day  insulin lispro (ADMELOG) corrective regimen sliding scale   SubCutaneous three times a day before meals  insulin lispro (ADMELOG) corrective regimen sliding scale   SubCutaneous at bedtime  latanoprost 0.005% Ophthalmic Solution 1 Drop(s) Both EYES at bedtime  risperiDONE   Tablet 2 milliGRAM(s) Oral two times a day  vancomycin  IVPB 1000 milliGRAM(s) IV Intermittent every 12 hours    MEDICATIONS  (PRN):  acetaminophen   Tablet .. 650 milliGRAM(s) Oral every 6 hours PRN Mild Pain (1 - 3)  acetaminophen   Tablet .. 650 milliGRAM(s) Oral every 6 hours PRN Temp greater or equal to 38C (100.4F)  ALBUTerol    90 MICROgram(s) HFA Inhaler 2 Puff(s) Inhalation every 6 hours PRN for shortness of breath and/or wheezing    RADIOLOGY; persoanlly visualized  ALl previous medical records personally reviewed

## 2021-09-28 LAB
ANION GAP SERPL CALC-SCNC: 7 MMOL/L — SIGNIFICANT CHANGE UP (ref 5–17)
BUN SERPL-MCNC: 10 MG/DL — SIGNIFICANT CHANGE UP (ref 7–23)
CALCIUM SERPL-MCNC: 7.7 MG/DL — LOW (ref 8.5–10.1)
CHLORIDE SERPL-SCNC: 105 MMOL/L — SIGNIFICANT CHANGE UP (ref 96–108)
CO2 SERPL-SCNC: 26 MMOL/L — SIGNIFICANT CHANGE UP (ref 22–31)
CREAT SERPL-MCNC: 0.9 MG/DL — SIGNIFICANT CHANGE UP (ref 0.5–1.3)
GLUCOSE SERPL-MCNC: 238 MG/DL — HIGH (ref 70–99)
HCT VFR BLD CALC: 29.7 % — LOW (ref 34.5–45)
HGB BLD-MCNC: 9.4 G/DL — LOW (ref 11.5–15.5)
MAGNESIUM SERPL-MCNC: 1.5 MG/DL — LOW (ref 1.6–2.6)
MCHC RBC-ENTMCNC: 26.9 PG — LOW (ref 27–34)
MCHC RBC-ENTMCNC: 31.6 GM/DL — LOW (ref 32–36)
MCV RBC AUTO: 85.1 FL — SIGNIFICANT CHANGE UP (ref 80–100)
PHOSPHATE SERPL-MCNC: 1.8 MG/DL — LOW (ref 2.5–4.5)
PLATELET # BLD AUTO: 208 K/UL — SIGNIFICANT CHANGE UP (ref 150–400)
POTASSIUM SERPL-MCNC: 3.3 MMOL/L — LOW (ref 3.5–5.3)
POTASSIUM SERPL-SCNC: 3.3 MMOL/L — LOW (ref 3.5–5.3)
RBC # BLD: 3.49 M/UL — LOW (ref 3.8–5.2)
RBC # FLD: 12.8 % — SIGNIFICANT CHANGE UP (ref 10.3–14.5)
SODIUM SERPL-SCNC: 138 MMOL/L — SIGNIFICANT CHANGE UP (ref 135–145)
WBC # BLD: 6.47 K/UL — SIGNIFICANT CHANGE UP (ref 3.8–10.5)
WBC # FLD AUTO: 6.47 K/UL — SIGNIFICANT CHANGE UP (ref 3.8–10.5)

## 2021-09-28 PROCEDURE — 99232 SBSQ HOSP IP/OBS MODERATE 35: CPT

## 2021-09-28 RX ORDER — POTASSIUM CHLORIDE 20 MEQ
10 PACKET (EA) ORAL
Refills: 0 | Status: DISCONTINUED | OUTPATIENT
Start: 2021-09-28 | End: 2021-09-28

## 2021-09-28 RX ORDER — POTASSIUM PHOSPHATE, MONOBASIC POTASSIUM PHOSPHATE, DIBASIC 236; 224 MG/ML; MG/ML
30 INJECTION, SOLUTION INTRAVENOUS ONCE
Refills: 0 | Status: DISCONTINUED | OUTPATIENT
Start: 2021-09-28 | End: 2021-09-28

## 2021-09-28 RX ORDER — POTASSIUM CHLORIDE 20 MEQ
40 PACKET (EA) ORAL EVERY 4 HOURS
Refills: 0 | Status: COMPLETED | OUTPATIENT
Start: 2021-09-29 | End: 2021-09-29

## 2021-09-28 RX ORDER — POTASSIUM CHLORIDE 20 MEQ
10 PACKET (EA) ORAL
Refills: 0 | Status: COMPLETED | OUTPATIENT
Start: 2021-09-28 | End: 2021-09-28

## 2021-09-28 RX ORDER — MAGNESIUM SULFATE 500 MG/ML
2 VIAL (ML) INJECTION ONCE
Refills: 0 | Status: COMPLETED | OUTPATIENT
Start: 2021-09-28 | End: 2021-09-28

## 2021-09-28 RX ORDER — MAGNESIUM SULFATE 500 MG/ML
2 VIAL (ML) INJECTION ONCE
Refills: 0 | Status: DISCONTINUED | OUTPATIENT
Start: 2021-09-28 | End: 2021-09-28

## 2021-09-28 RX ORDER — POTASSIUM PHOSPHATE, MONOBASIC POTASSIUM PHOSPHATE, DIBASIC 236; 224 MG/ML; MG/ML
15 INJECTION, SOLUTION INTRAVENOUS ONCE
Refills: 0 | Status: COMPLETED | OUTPATIENT
Start: 2021-09-28 | End: 2021-09-28

## 2021-09-28 RX ADMIN — HEPARIN SODIUM 5000 UNIT(S): 5000 INJECTION INTRAVENOUS; SUBCUTANEOUS at 21:21

## 2021-09-28 RX ADMIN — BUDESONIDE AND FORMOTEROL FUMARATE DIHYDRATE 2 PUFF(S): 160; 4.5 AEROSOL RESPIRATORY (INHALATION) at 08:38

## 2021-09-28 RX ADMIN — Medication 50 GRAM(S): at 12:44

## 2021-09-28 RX ADMIN — HEPARIN SODIUM 5000 UNIT(S): 5000 INJECTION INTRAVENOUS; SUBCUTANEOUS at 10:36

## 2021-09-28 RX ADMIN — BUDESONIDE AND FORMOTEROL FUMARATE DIHYDRATE 2 PUFF(S): 160; 4.5 AEROSOL RESPIRATORY (INHALATION) at 20:39

## 2021-09-28 RX ADMIN — Medication 100 MILLIEQUIVALENT(S): at 15:43

## 2021-09-28 RX ADMIN — LATANOPROST 1 DROP(S): 0.05 SOLUTION/ DROPS OPHTHALMIC; TOPICAL at 21:30

## 2021-09-28 RX ADMIN — ALBUTEROL 2 PUFF(S): 90 AEROSOL, METERED ORAL at 08:40

## 2021-09-28 RX ADMIN — INSULIN GLARGINE 30 UNIT(S): 100 INJECTION, SOLUTION SUBCUTANEOUS at 08:35

## 2021-09-28 RX ADMIN — CEFTRIAXONE 1000 MILLIGRAM(S): 500 INJECTION, POWDER, FOR SOLUTION INTRAMUSCULAR; INTRAVENOUS at 21:22

## 2021-09-28 RX ADMIN — Medication 250 MILLIGRAM(S): at 10:38

## 2021-09-28 RX ADMIN — Medication 250 MILLIGRAM(S): at 21:22

## 2021-09-28 RX ADMIN — INSULIN GLARGINE 30 UNIT(S): 100 INJECTION, SOLUTION SUBCUTANEOUS at 21:22

## 2021-09-28 RX ADMIN — POTASSIUM PHOSPHATE, MONOBASIC POTASSIUM PHOSPHATE, DIBASIC 62.5 MILLIMOLE(S): 236; 224 INJECTION, SOLUTION INTRAVENOUS at 14:06

## 2021-09-28 RX ADMIN — RISPERIDONE 2 MILLIGRAM(S): 4 TABLET ORAL at 21:21

## 2021-09-28 RX ADMIN — Medication 4: at 17:38

## 2021-09-28 RX ADMIN — ALBUTEROL 2 PUFF(S): 90 AEROSOL, METERED ORAL at 20:37

## 2021-09-28 RX ADMIN — Medication 100 MILLIEQUIVALENT(S): at 14:35

## 2021-09-28 RX ADMIN — Medication 325 MILLIGRAM(S): at 21:21

## 2021-09-28 RX ADMIN — PREGABALIN 500 MICROGRAM(S): 225 CAPSULE ORAL at 10:35

## 2021-09-28 RX ADMIN — ARIPIPRAZOLE 30 MILLIGRAM(S): 15 TABLET ORAL at 10:34

## 2021-09-28 RX ADMIN — Medication 4: at 08:33

## 2021-09-28 RX ADMIN — RISPERIDONE 2 MILLIGRAM(S): 4 TABLET ORAL at 10:34

## 2021-09-28 RX ADMIN — CITALOPRAM 30 MILLIGRAM(S): 10 TABLET, FILM COATED ORAL at 21:22

## 2021-09-28 RX ADMIN — CITALOPRAM 30 MILLIGRAM(S): 10 TABLET, FILM COATED ORAL at 10:33

## 2021-09-28 RX ADMIN — Medication 4: at 12:33

## 2021-09-28 RX ADMIN — Medication 4: at 21:30

## 2021-09-28 RX ADMIN — Medication 100 MILLIEQUIVALENT(S): at 17:45

## 2021-09-28 NOTE — PROGRESS NOTE ADULT - SUBJECTIVE AND OBJECTIVE BOX
HOSPITALIST PROGRESS NOTE:  SUBJECTIVE:  PCP:  Chief Complaint: Patient is a 61y old  Female who presents with a chief complaint of Hyperglycemia, Hypokalemia, Hypomagnesemia (28 Sep 2021 09:33)      HPI:  60 yo obese F with PMHx significant for COPD (dependent on 4L O2 via NC at home), pulmonary HTN, IDDM, COLLEEN on CPAP, and HTN BIBEMS from pulmonologist office due to glucose over 600 despite taking insulin.  Patient currently taking metformin 1000mg, proglitazone 45 mg daily and novolin 120 units BID before meals. Patient is adherent to medication regimen. Patients states that for the past month her glucose levels have been similar to the  glucose on admission and has been experiencing generalized weakness, lethargy, unbalance, xerostomia, polydipsia, polyuria and intermittent palpitations. Patient has not been sick recently, no sick contacts and denies alcohol/smoking/recreational drugs.   Pt denies chest pain, fever, chills. nausea, vomting, diarrhrea, abd pain, dysuria, polyphagia.     In the ED, pt glucose >600, K+ 1.7, Mg 1.3, Cr. 1.98,, Ca+ 7.7, Cl- 90, small acetone, PH of 7.33, trace amount of ketone in the urine.  Patient given 25 units of lantus, 25 units of humulin, mag and potassium repleted. She was bolused 2L NS. Case discussed with ICU ( Dr. Reilly) who feels patient does not need ICU care at this time. Admit to tele unit instead.  (23 Sep 2021 20:14)    9/28:  Above reviewed; patient has no complaints continues to have electrolyte imblanaces; No other events       Allergies:  celery allergy (Faint)  No Known Drug Allergies    REVIEW OF SYSTEMS:  See HPI. All other review of systems is negative unless indicated above.     OBJECTIVE  Physical Exam:  Vital Signs Last 24 Hrs  T(C): 37.3 (28 Sep 2021 15:15), Max: 37.7 (27 Sep 2021 16:01)  T(F): 99.2 (28 Sep 2021 15:15), Max: 99.9 (27 Sep 2021 16:01)  HR: 79 (28 Sep 2021 15:15) (78 - 98)  BP: 127/71 (28 Sep 2021 15:15) (120/63 - 144/80)  BP(mean): 84 (27 Sep 2021 18:00) (84 - 97)  RR: 18 (28 Sep 2021 15:15) (18 - 25)  SpO2: 98% (28 Sep 2021 15:15) (94% - 100%)      Constitutional: NAD, awake obese   Neurological: AAO x 3, no focal deficits  HEENT: PERRLA, EOMI, MMM  Neck: Soft and supple, No LAD, No JVD  Respiratory: Breath sounds are clear bilaterally, No wheezing, rales or rhonchi  Cardiovascular: S1 and S2, regular rate and rhythm; no Murmurs, gallops or rubs  Gastrointestinal: Bowel Sounds present, soft, nontender, nondistended, no guarding, no rebound tenderness  Back: No CVA tenderness   Extremities: No peripheral edema  Vascular: 2+ peripheral pulses  Musculoskeletal: 5/5 strength b/l upper and lower extremities  Skin: No rashes  Breast: Deferred  Rectal: Deferred    MEDICATIONS  (STANDING):  ARIPiprazole 30 milliGRAM(s) Oral daily  budesonide  80 MICROgram(s)/formoterol 4.5 MICROgram(s) Inhaler 2 Puff(s) Inhalation two times a day  cefTRIAXone Injectable. 1000 milliGRAM(s) IV Push every 24 hours  citalopram 30 milliGRAM(s) Oral <User Schedule>  cyanocobalamin 500 MICROGram(s) Oral daily  dextrose 40% Gel 15 Gram(s) Oral once  dextrose 5%. 1000 milliLiter(s) (50 mL/Hr) IV Continuous <Continuous>  dextrose 5%. 1000 milliLiter(s) (100 mL/Hr) IV Continuous <Continuous>  dextrose 50% Injectable 25 Gram(s) IV Push once  dextrose 50% Injectable 12.5 Gram(s) IV Push once  dextrose 50% Injectable 25 Gram(s) IV Push once  ferrous    sulfate 325 milliGRAM(s) Oral at bedtime  glucagon  Injectable 1 milliGRAM(s) IntraMuscular once  heparin   Injectable 5000 Unit(s) SubCutaneous every 12 hours  influenza   Vaccine 0.5 milliLiter(s) IntraMuscular once  insulin glargine Injectable (LANTUS) 30 Unit(s) SubCutaneous two times a day  insulin lispro (ADMELOG) corrective regimen sliding scale   SubCutaneous three times a day before meals  insulin lispro (ADMELOG) corrective regimen sliding scale   SubCutaneous at bedtime  latanoprost 0.005% Ophthalmic Solution 1 Drop(s) Both EYES at bedtime  potassium chloride  10 mEq/100 mL IVPB 10 milliEquivalent(s) IV Intermittent every 1 hour  risperiDONE   Tablet 2 milliGRAM(s) Oral two times a day  vancomycin  IVPB 1000 milliGRAM(s) IV Intermittent every 12 hours      LABS: All Labs Reviewed:                        9.4    6.47  )-----------( 208      ( 28 Sep 2021 09:37 )             29.7     09-28    138  |  105  |  10  ----------------------------<  238<H>  3.3<L>   |  26  |  0.90    Ca    7.7<L>      28 Sep 2021 09:37  Phos  1.8     09-28  Mg     1.5     09-28    TPro  6.1  /  Alb  2.7<L>  /  TBili  0.5  /  DBili  x   /  AST  7<L>  /  ALT  19  /  AlkPhos  65  09-27      Blood Culture:   09-25 @ 18:02  Organism Blood Culture PCR  Gram Stain Blood -- Gram Stain   Growth in anaerobic bottle: Gram Positive Cocci in Clusters  Specimen Source .Blood Blood-Peripheral  Culture-Blood --    09-23 @ 17:01  Organism --  Gram Stain Blood -- Gram Stain --  Specimen Source Clean Catch None  Culture-Blood --        RADIOLOGY/EKG:    < from: Xray Chest 1 View- PORTABLE-Urgent (09.23.21 @ 15:43) >    IMPRESSION: No change. Possible heart enlargement.    < end of copied text >  < from: CT Abdomen and Pelvis No Cont (01.07.20 @ 14:10) >    IMPRESSION:     Postoperative changes status post gastric bypass. No evidence of bowel obstruction.    Fecalization of distal small bowel contents, without evidence of abnormally dilated bowel loops, suggesting a component of slow transit.    Cholelithiasis with prominent distention of the gallbladder. Correlate with clinical and laboratory findings.    < end of copied text >

## 2021-09-28 NOTE — CHART NOTE - NSCHARTNOTEFT_GEN_A_CORE
Patient refusing psychiatric evaluation: denies acute psychiatric concerns / needs, stating having her own out-patient psychiatrist. Patient is in good behavioral and impulse control. No emergent psychiatric intervention warranted at this time.

## 2021-09-28 NOTE — PROGRESS NOTE ADULT - ASSESSMENT
62 y/o obese Female with h/o COPD (dependent on 4L O2 via NC at home), pulmonary HTN, IDDM, COLLEEN on CPAP, and HTN was admitted on 9/23 from pulmonologist office due to glucose over 600 despite taking insulin. Patient was on metformin 1000mg, proglitazone 45 mg daily and novolin 120 units BID before meals. Patients states that for the past month her glucose levels have been similar to the  glucose on admission and has been experiencing generalized weakness, lethargy, unbalance, xerostomia, polydipsia, polyuria and intermittent palpitations. Patient was noted with phlebitis and BC from 9/25 were reported with bacteremia. She received ceftriaxone IV.     1. Sepsis with STAU. Right forearm cellulitis and phlebitis. Obesity. DM type 2 poorly controlled.  -low grade fever  -forearm is improving   -right arm phlebitis site culture pending  -BC x 2 noted  -on vancomycin 1 mg IV q12h and ceftriaxone 1 gm IV qd # 2  -tolerating abx well so far; no side effects noted  -obtain vancomycin trough level  -local wound care  -continue abx coverage   -f/u cultures  -monitor temps  -f/u CBC  -supportive care  2. Other issues:   -care per medicine    d/w Dr. MARIANNE Tolentino

## 2021-09-28 NOTE — PROGRESS NOTE ADULT - ASSESSMENT
61 COPD (dependent on 4L O2 via NC at home), pulmonary HTN, IDDM, COLLEEN on CPAP, and HTN BIBEMS from pulmonologist office due to glucose over 600 despite taking insulin, renal evaluation of hypokalemia/lyte abnormalities.  Patient currently taking metformin 1000mg, proglitazone 45 mg daily and novolin 120 units BID before meals.    Hypokalemia/Lyte abnormalities  -Whole body deficit with poor po, chronic thiazide use stabilizing  -Urine studies ok, urinary potassium not impressive for urinary losses  -Pending stephanie, light chains ok  -Mg repletion is key, IV k ordered as well  -Repletion with IV K phos ordered as well    Uncontrolled glucose  -Medicine optimizing  -Nutrition  -SW    D/c with rn staff

## 2021-09-28 NOTE — PROGRESS NOTE ADULT - SUBJECTIVE AND OBJECTIVE BOX
Patient is a 61y old  Female who presents with a chief complaint of Hyperglycemia, Hypokalemia, Hypomagnesemia (23 Sep 2021 20:14)      HPI:  62 yo obese F with PMHx significant for COPD (dependent on 4L O2 via NC at home), pulmonary HTN, IDDM, COLLEEN on CPAP, and HTN BIBEMS from pulmonologist office due to glucose over 600 despite taking insulin.  Patient currently taking metformin 1000mg, proglitazone 45 mg daily and novolin 120 units BID before meals. Patient states that she is compliant but HbA1c noted to be 11.  Was supposed to be seen in the office yesterday for COPD but due to the BG being >600, was sent to  and brought in by ambulance   In the ED, pt glucose >600, K+ 1.7, Mg 1.3, Cr. 1.98,, Ca+ 7.7, Cl- 90, small acetone, PH of 7.33, trace amount of ketone in the urine.  Patient given 25 units of lantus, 25 units of humulin, mag and potassium repleted.   EKG QTc noted to be 649   Repeat Glucose is 158, K 2.1  Pulmonary wise, she has a history of COPD, pulmonary hypertension, COLLEEN      No acute pulmonary events occurred overnight    PAST MEDICAL & SURGICAL HISTORY:  Bipolar 1 disorder    Diabetes mellitus  Type 2    Hypertension    COLLEEN (obstructive sleep apnea)    ADHD (attention deficit hyperactivity disorder)    COPD (chronic obstructive pulmonary disease)    Anemia     delivery delivered  x2    Gastric bypass status for obesity      MEDICATIONS  (STANDING):  ARIPiprazole 30 milliGRAM(s) Oral daily  budesonide  80 MICROgram(s)/formoterol 4.5 MICROgram(s) Inhaler 2 Puff(s) Inhalation two times a day  cefTRIAXone Injectable. 1000 milliGRAM(s) IV Push every 24 hours  citalopram 30 milliGRAM(s) Oral <User Schedule>  cyanocobalamin 500 MICROGram(s) Oral daily  dextrose 40% Gel 15 Gram(s) Oral once  dextrose 5%. 1000 milliLiter(s) (100 mL/Hr) IV Continuous <Continuous>  dextrose 5%. 1000 milliLiter(s) (50 mL/Hr) IV Continuous <Continuous>  dextrose 50% Injectable 25 Gram(s) IV Push once  dextrose 50% Injectable 12.5 Gram(s) IV Push once  dextrose 50% Injectable 25 Gram(s) IV Push once  ferrous    sulfate 325 milliGRAM(s) Oral at bedtime  glucagon  Injectable 1 milliGRAM(s) IntraMuscular once  heparin   Injectable 5000 Unit(s) SubCutaneous every 12 hours  influenza   Vaccine 0.5 milliLiter(s) IntraMuscular once  insulin glargine Injectable (LANTUS) 40 Unit(s) SubCutaneous at bedtime  insulin lispro (ADMELOG) corrective regimen sliding scale   SubCutaneous three times a day before meals  insulin lispro (ADMELOG) corrective regimen sliding scale   SubCutaneous at bedtime  latanoprost 0.005% Ophthalmic Solution 1 Drop(s) Both EYES at bedtime  potassium chloride    Tablet ER 40 milliEquivalent(s) Oral two times a day  risperiDONE   Tablet 2 milliGRAM(s) Oral two times a day    MEDICATIONS  (PRN):  acetaminophen   Tablet .. 650 milliGRAM(s) Oral every 6 hours PRN Mild Pain (1 - 3)  ALBUTerol    90 MICROgram(s) HFA Inhaler 2 Puff(s) Inhalation every 6 hours PRN for shortness of breath and/or wheezing      Vital Signs Last 24 Hrs  T(C): 36.9 (28 Sep 2021 08:38), Max: 37.7 (27 Sep 2021 16:01)  T(F): 98.5 (28 Sep 2021 08:38), Max: 99.9 (27 Sep 2021 16:01)  HR: 78 (28 Sep 2021 08:38) (78 - 98)  BP: 121/70 (28 Sep 2021 08:38) (120/63 - 148/84)  BP(mean): 84 (27 Sep 2021 18:00) (74 - 102)  RR: 18 (28 Sep 2021 08:38) (18 - 31)  SpO2: 100% (28 Sep 2021 08:38) (94% - 100%)    PHYSICAL EXAM  General Appearance: cooperative, no acute distress,   HEENT: PERRL, conjunctiva clear, EOM's intact, non injected pharynx, no exudate, TM   normal  Neck: Supple, , no adenopathy, thyroid: not enlarged, no carotid bruit or JVD  Back: Symmetric, no  tenderness,no soft tissue tenderness  Lungs: Clear to auscultation bilateral,no adventitious breath sounds, normal   expiratory phase  Heart: Regular rate and rhythm, S1, S2 normal, no murmur, rub or gallop  Abdomen: Soft, non-tender, bowel sounds active , no hepatosplenomegaly  Extremities: no cyanosis or edema, no joint swelling  Skin: Skin color, texture normal, no rashes   Neurologic: Alert and oriented X3 , cranial nerves intact, sensory and motor normal,    ECG:    LABS:                          11.0   9.99  )-----------( 266      ( 23 Sep 2021 15:48 )             34.2     09-24    139  |  104  |  28<H>  ----------------------------<  157<H>  2.1<LL>   |  30  |  1.25    Ca    7.2<L>      24 Sep 2021 03:21  Phos  1.9     -  Mg     1.6     -    TPro  7.1  /  Alb  3.4  /  TBili  0.5  /  DBili  x   /  AST  10<L>  /  ALT  22  /  AlkPhos  86  09-23    CARDIAC MARKERS ( 23 Sep 2021 15:48 )  0.039 ng/mL / x     / x     / x     / x              PT/INR - ( 23 Sep 2021 15:48 )   PT: 11.7 sec;   INR: 1.01 ratio         PTT - ( 23 Sep 2021 15:48 )  PTT:26.3 sec  Urinalysis Basic - ( 23 Sep 2021 17:01 )    Color: Yellow / Appearance: Clear / S.010 / pH: x  Gluc: x / Ketone: Trace  / Bili: Negative / Urobili: Negative mg/dL   Blood: x / Protein: 30 mg/dL / Nitrite: Negative   Leuk Esterase: Negative / RBC: 3-5 /HPF / WBC 3-5   Sq Epi: x / Non Sq Epi: Few / Bacteria: Few            RADIOLOGY & ADDITIONAL STUDIES:

## 2021-09-28 NOTE — PROGRESS NOTE ADULT - ASSESSMENT
1) Hyperglycemia   2) Hypokalemia  3) Translocation Hyponatremia  4) COPD  5) Pulmonary Hypertension  6) Prolonged QTc  7) COLLEEN    62 yo obese F with PMHx significant for COPD (dependent on 4L O2 via NC at home), pulmonary HTN, IDDM, COLLEEN on CPAP, and HTN BIBEMS from pulmonologist office due to glucose over 600 despite taking insulin.  Patient currently taking metformin 1000mg, proglitazone 45 mg daily and novolin 120 units BID before meals. Patient states that she is compliant but HbA1c noted to be 11.  Was supposed to be seen in the office yesterday for COPD but due to the BG being >600, was sent to  and brought in by ambulance   In the ED, pt glucose >600, K+ 1.7, Mg 1.3, Cr. 1.98,, Ca+ 7.7, Cl- 90, small acetone, PH of 7.33, trace amount of ketone in the urine.  Patient given 25 units of lantus, 25 units of humulin, mag and potassium repleted.   She has a history of PH, PFT in the past revealed normal spirometry with an RV/TLC of 166/124%, air trapping/hyperinflation. DLCO 58%  On Breo daily outpatient and albuterol PRN; now on Symbicort   Uses CPAP nightly [id number] (Auto set at home 5-15 cmH2O)  Pulmonary wise, status is appropriate and respiratory muscle weakness that she was experiencing likely 2/2 to electrolyte abnormalities in the setting of hyperglycemia  EKG reviewed, QTc noted to be 649 and 624 now 476  electrolytes improving ; continue closely trending Mg/K+/Ph/Ca  Pulmonary wise, she has a history of COPD, pulmonary hypertension, COLLEEN, all well controlled and she is not in an exacerbated state  Will follow up as an outpatient; please re consult with further questions

## 2021-09-28 NOTE — PROGRESS NOTE ADULT - SUBJECTIVE AND OBJECTIVE BOX
Date of service: 09-28-21 @ 09:33    Right arm feels less tender  Has antecubital area pain ans swelling  Has low grade fever    ROS: denies dizziness, no HA, no SOB or cough, no abdominal pain, no diarrhea or constipation; no dysuria, no legs pain    MEDICATIONS  (STANDING):  ARIPiprazole 30 milliGRAM(s) Oral daily  budesonide  80 MICROgram(s)/formoterol 4.5 MICROgram(s) Inhaler 2 Puff(s) Inhalation two times a day  cefTRIAXone Injectable. 1000 milliGRAM(s) IV Push every 24 hours  citalopram 30 milliGRAM(s) Oral <User Schedule>  cyanocobalamin 500 MICROGram(s) Oral daily  dextrose 40% Gel 15 Gram(s) Oral once  dextrose 5%. 1000 milliLiter(s) (100 mL/Hr) IV Continuous <Continuous>  dextrose 5%. 1000 milliLiter(s) (50 mL/Hr) IV Continuous <Continuous>  dextrose 50% Injectable 25 Gram(s) IV Push once  dextrose 50% Injectable 12.5 Gram(s) IV Push once  dextrose 50% Injectable 25 Gram(s) IV Push once  ferrous    sulfate 325 milliGRAM(s) Oral at bedtime  glucagon  Injectable 1 milliGRAM(s) IntraMuscular once  heparin   Injectable 5000 Unit(s) SubCutaneous every 12 hours  influenza   Vaccine 0.5 milliLiter(s) IntraMuscular once  insulin glargine Injectable (LANTUS) 30 Unit(s) SubCutaneous two times a day  insulin lispro (ADMELOG) corrective regimen sliding scale   SubCutaneous three times a day before meals  insulin lispro (ADMELOG) corrective regimen sliding scale   SubCutaneous at bedtime  latanoprost 0.005% Ophthalmic Solution 1 Drop(s) Both EYES at bedtime  risperiDONE   Tablet 2 milliGRAM(s) Oral two times a day  vancomycin  IVPB 1000 milliGRAM(s) IV Intermittent every 12 hours    Vital Signs Last 24 Hrs  T(C): 36.9 (28 Sep 2021 08:38), Max: 37.7 (27 Sep 2021 16:01)  T(F): 98.5 (28 Sep 2021 08:38), Max: 99.9 (27 Sep 2021 16:01)  HR: 78 (28 Sep 2021 08:38) (78 - 98)  BP: 121/70 (28 Sep 2021 08:38) (120/63 - 148/84)  BP(mean): 84 (27 Sep 2021 18:00) (74 - 102)  RR: 18 (28 Sep 2021 08:38) (18 - 31)  SpO2: 100% (28 Sep 2021 08:38) (94% - 100%)     Physical exam:    Constitutional:  No acute distress  HEENT: NC/AT, EOMI, PERRLA, conjunctivae clear; ears and nose atraumatic  Neck: supple; thyroid not palpable  Back: no tenderness  Respiratory: respiratory effort normal; clear to auscultation  Cardiovascular: S1S2 regular, no murmurs  Abdomen: soft, not tender, not distended, positive BS  Genitourinary: no suprapubic tenderness  Lymphatic: no LN palpable  Musculoskeletal: no muscle tenderness, no joint swelling or tenderness  Extremities: no pedal edema  Right antecubital area of erythema, edema; phlebitis with scant discharge - improving   Neurological/ Psychiatric: AxOx3, judgement and insight normal; moving all extremities  Skin: no rashes; no palpable lesions    Labs: reviewed                        9.6    7.62  )-----------( 213      ( 27 Sep 2021 05:48 )             29.8     09-27    139  |  105  |  12  ----------------------------<  263<H>  3.1<L>   |  28  |  0.91    Ca    7.5<L>      27 Sep 2021 05:48  Phos  1.9     09-27  Mg     1.5     09-27    TPro  6.1  /  Alb  2.7<L>  /  TBili  0.5  /  DBili  x   /  AST  7<L>  /  ALT  19  /  AlkPhos  65  09-27                LIVER FUNCTIONS - ( 27 Sep 2021 05:48 )  Alb: 2.7 g/dL / Pro: 6.1 gm/dL / ALK PHOS: 65 U/L / ALT: 19 U/L / AST: 7 U/L / GGT: x           COVID-19 PCR: NotDetec (09-23-21 @ 15:48)      Culture - Blood (collected 25 Sep 2021 18:02)  Source: .Blood Blood-Peripheral  Gram Stain (27 Sep 2021 05:54):    Growth in anaerobic bottle: Gram Positive Cocci in Clusters  Preliminary Report (27 Sep 2021 05:54):    Growth in anaerobic bottle: Gram Positive Cocci in Clusters  Organism: Blood Culture PCR (27 Sep 2021 07:31)      -  Staphylococcus aureus: Detec Any isolate of Staphylococcus aureus from a blood culture is NOT considered a contaminant.      Method Type: PCR    Culture - Blood (collected 25 Sep 2021 18:02)  Source: .Blood Blood-Peripheral  Preliminary Report (27 Sep 2021 07:01):    No growth to date.    Culture - Urine (collected 23 Sep 2021 17:01)  Source: Clean Catch None  Final Report (25 Sep 2021 01:06):    <10,000 CFU/mL Normal Urogenital Hawa    Radiology: all available radiological tests reviewed    < from: US Duplex Venous Upper Ext Ltd, Right (09.26.21 @ 09:24) >  No evidence of right upper extremity deep venous thrombosis.  Thrombosis of the right cephalic vein in the antecubital fossa  Suggestion of edema in the subcutaneous fat at the level of thrombosis, correlate for cellulitis.  < end of copied text >      Advanced directives addressed: full resuscitation

## 2021-09-28 NOTE — PROGRESS NOTE ADULT - SUBJECTIVE AND OBJECTIVE BOX
Patient is a 61y Female who reports no complaints as new.       MEDICATIONS  (STANDING):  ARIPiprazole 30 milliGRAM(s) Oral daily  budesonide  80 MICROgram(s)/formoterol 4.5 MICROgram(s) Inhaler 2 Puff(s) Inhalation two times a day  cefTRIAXone Injectable. 1000 milliGRAM(s) IV Push every 24 hours  citalopram 30 milliGRAM(s) Oral <User Schedule>  cyanocobalamin 500 MICROGram(s) Oral daily  dextrose 40% Gel 15 Gram(s) Oral once  dextrose 5%. 1000 milliLiter(s) (50 mL/Hr) IV Continuous <Continuous>  dextrose 5%. 1000 milliLiter(s) (100 mL/Hr) IV Continuous <Continuous>  dextrose 50% Injectable 25 Gram(s) IV Push once  dextrose 50% Injectable 12.5 Gram(s) IV Push once  dextrose 50% Injectable 25 Gram(s) IV Push once  ferrous    sulfate 325 milliGRAM(s) Oral at bedtime  glucagon  Injectable 1 milliGRAM(s) IntraMuscular once  heparin   Injectable 5000 Unit(s) SubCutaneous every 12 hours  influenza   Vaccine 0.5 milliLiter(s) IntraMuscular once  insulin glargine Injectable (LANTUS) 30 Unit(s) SubCutaneous two times a day  insulin lispro (ADMELOG) corrective regimen sliding scale   SubCutaneous three times a day before meals  insulin lispro (ADMELOG) corrective regimen sliding scale   SubCutaneous at bedtime  latanoprost 0.005% Ophthalmic Solution 1 Drop(s) Both EYES at bedtime  potassium chloride  10 mEq/100 mL IVPB 10 milliEquivalent(s) IV Intermittent every 1 hour  risperiDONE   Tablet 2 milliGRAM(s) Oral two times a day  vancomycin  IVPB 1000 milliGRAM(s) IV Intermittent every 12 hours    MEDICATIONS  (PRN):  acetaminophen   Tablet .. 650 milliGRAM(s) Oral every 6 hours PRN Mild Pain (1 - 3)  acetaminophen   Tablet .. 650 milliGRAM(s) Oral every 6 hours PRN Temp greater or equal to 38C (100.4F)  ALBUTerol    90 MICROgram(s) HFA Inhaler 2 Puff(s) Inhalation every 6 hours PRN for shortness of breath and/or wheezing        T(C): , Max: 37.7 (09-27-21 @ 16:01)  T(F): , Max: 99.9 (09-27-21 @ 16:01)  HR: 78 (09-28-21 @ 08:38)  BP: 121/70 (09-28-21 @ 08:38)  BP(mean): 84 (09-27-21 @ 18:00)  RR: 18 (09-28-21 @ 08:38)  SpO2: 100% (09-28-21 @ 08:38)  Wt(kg): --        PHYSICAL EXAM:    Constitutional: NAD,  HEENT: PERRLA, EOMI,  MMM  Neck: No LAD, No JVD  Respiratory: CTAB  Cardiovascular: S1 and S2, RRR  Gastrointestinal: obese  Extremities: No peripheral edema  Neurological: A/O x 3, no focal deficits  Psychiatric: Normal mood, normal affect  : No David  Skin: No rashes  Access: Not applicable        LABS:                        9.4    6.47  )-----------( 208      ( 28 Sep 2021 09:37 )             29.7     28 Sep 2021 09:37    138    |  105    |  10     ----------------------------<  238    3.3     |  26     |  0.90   27 Sep 2021 05:48    139    |  105    |  12     ----------------------------<  263    3.1     |  28     |  0.91   26 Sep 2021 15:06    141    |  107    |  12     ----------------------------<  241    3.6     |  31     |  1.18   26 Sep 2021 06:39    140    |  106    |  12     ----------------------------<  393    2.6     |  27     |  1.06   25 Sep 2021 18:02    138    |  104    |  10     ----------------------------<  361    3.3     |  29     |  1.28     Ca    7.7        28 Sep 2021 09:37  Ca    7.5        27 Sep 2021 05:48  Ca    7.6        26 Sep 2021 15:06  Ca    7.3        26 Sep 2021 06:39  Ca    7.4        25 Sep 2021 18:02  Phos  1.8       28 Sep 2021 09:37  Phos  1.9       27 Sep 2021 05:48  Phos  1.8       26 Sep 2021 15:06  Phos  2.2       26 Sep 2021 06:39  Phos  2.0       25 Sep 2021 18:02  Mg     1.5       28 Sep 2021 09:37  Mg     1.5       27 Sep 2021 05:48  Mg     2.0       26 Sep 2021 15:06  Mg     1.5       26 Sep 2021 06:39  Mg     1.6       25 Sep 2021 18:02    TPro  6.1    /  Alb  2.7    /  TBili  0.5    /  DBili  x      /  AST  7      /  ALT  19     /  AlkPhos  65     27 Sep 2021 05:48  TPro  6.0    /  Alb  2.7    /  TBili  0.5    /  DBili  x      /  AST  7      /  ALT  19     /  AlkPhos  68     26 Sep 2021 06:39  TPro  6.1    /  Alb  2.8    /  TBili  0.4    /  DBili  x      /  AST  11     /  ALT  19     /  AlkPhos  65     24 Sep 2021 17:27          Urine Studies:    Creatinine, Random Urine: 40 mg/dL (09-26 @ 18:35)  Potassium, Random Urine: 9.3 mmol/L (09-26 @ 18:35)  Osmolality, Random Urine: 370 mosm/Kg (09-26 @ 18:35)  Creatinine, Random Urine: 35 mg/dL (09-26 @ 18:35)  Protein/Creatinine Ratio Calculation: 0.7 Ratio (09-26 @ 18:35)        RADIOLOGY & ADDITIONAL STUDIES:

## 2021-09-28 NOTE — PROGRESS NOTE ADULT - ASSESSMENT
60 yo obese F with PMHx significant for COPD (dependent on 4L O2 via NC at home), pulmonary HTN, IDDM, COLLEEN on CPAP, and HTN resenting with hyperglycemia and abnormal electrolytes concerning for DKA/HHS    #Hyperglycemia, early HHS  -Glucose >600 on admission down to 128 s/p 25 units lantus and 25 units humulin in ED  -A1c: 11.0  -Blood sugars ranging 200s-300s currently  -On Lantus 20 units BID, increase to 30 units BID starting tonight  -Continue correctional insulin sliding scale   -Severe electrolytes derangement, will monitor and replete PRN  -Carb controlled diet, diabetic teaching    # Severe Hypokalemia, Hypomagnesemia and Hypophosphatemia   -This am, K 3.1, Mg 1.5, Phos 1.9 - repleted  -Follow daily BMP, Mg and Phos replete PRN  -Nephrology recs appreciated; f/u urine studies for potassium wasting evaluation, continue to replete potassium until whole body stores are replete  -potassium 40mg PO Q4H X 4 doses and reassess     #Staph Aureus Bacteremia 2/2 infected IV line  -Puss noted on removal of R AC IV line  -Blood cultures positive for Staph Aureus  -Ceftriaxone and Vancomycin#2  -ID following, recs appreciated  -Repeat blood cultures pending, follow up    #Unsafe home environment, Depression, Anxiety  -Patient takes multiple psychiatric mediations: Risperdal Abilify, Celexa  -On PRN Xanax at home up to Q6, states she takes more some days than others, has not gotten it here but has not asked for it- states she feels safe here   -No benzo withdrawal symptoms noted  -SW consulted to discuss possible resources and next steps  -Psych consulted - patient refused to speak with our psychiatrist    #ADINA, resolved  -Likely due to dehydration secondary to osmotic diuresis, now s/p IVF resucitation  -Monitor    #Hyponatremia   -Resolved, continue to monitor    #Normocytic anemia  - Chronic, possibly related to hx of gastric bypass surgery (2007)  - On iron and B12 supplementation  - Last colonoscopy 2019, normal as per patient    #HTN  - Normotensive  - Have been holding Losartan and HCTZ due to ADINA and electrolyte abnormalities on admission   - Monitor BP off medication for now as patients BP remains within normal range    #COLLEEN  - Continue CPAP at bedtime    #COPD, Pulmonary HTN  - Stable  - moderate pulm HTN noted on ECHO (Nov 2018)  - Likely related to underlying COPD, COLLEEN  - Continue home inhalers    #DVT PPx  - Heparin SQ    #Advanced Directives  - Full Code  - HCP : Daughter Ronit

## 2021-09-29 LAB
-  AMPICILLIN/SULBACTAM: SIGNIFICANT CHANGE UP
-  CEFAZOLIN: SIGNIFICANT CHANGE UP
-  CLINDAMYCIN: SIGNIFICANT CHANGE UP
-  ERYTHROMYCIN: SIGNIFICANT CHANGE UP
-  GENTAMICIN: SIGNIFICANT CHANGE UP
-  OXACILLIN: SIGNIFICANT CHANGE UP
-  PENICILLIN: SIGNIFICANT CHANGE UP
-  RIFAMPIN: SIGNIFICANT CHANGE UP
-  TETRACYCLINE: SIGNIFICANT CHANGE UP
-  TRIMETHOPRIM/SULFAMETHOXAZOLE: SIGNIFICANT CHANGE UP
-  VANCOMYCIN: SIGNIFICANT CHANGE UP
ANION GAP SERPL CALC-SCNC: 7 MMOL/L — SIGNIFICANT CHANGE UP (ref 5–17)
BUN SERPL-MCNC: 8 MG/DL — SIGNIFICANT CHANGE UP (ref 7–23)
CALCIUM SERPL-MCNC: 8.2 MG/DL — LOW (ref 8.5–10.1)
CHLORIDE SERPL-SCNC: 107 MMOL/L — SIGNIFICANT CHANGE UP (ref 96–108)
CO2 SERPL-SCNC: 26 MMOL/L — SIGNIFICANT CHANGE UP (ref 22–31)
CREAT SERPL-MCNC: 0.82 MG/DL — SIGNIFICANT CHANGE UP (ref 0.5–1.3)
CULTURE RESULTS: SIGNIFICANT CHANGE UP
GLUCOSE SERPL-MCNC: 193 MG/DL — HIGH (ref 70–99)
INTERPRETATION SERPL IFE-IMP: SIGNIFICANT CHANGE UP
MAGNESIUM SERPL-MCNC: 1.6 MG/DL — SIGNIFICANT CHANGE UP (ref 1.6–2.6)
METHOD TYPE: SIGNIFICANT CHANGE UP
ORGANISM # SPEC MICROSCOPIC CNT: SIGNIFICANT CHANGE UP
PHOSPHATE SERPL-MCNC: 2.6 MG/DL — SIGNIFICANT CHANGE UP (ref 2.5–4.5)
POTASSIUM SERPL-MCNC: 3.1 MMOL/L — LOW (ref 3.5–5.3)
POTASSIUM SERPL-SCNC: 3.1 MMOL/L — LOW (ref 3.5–5.3)
SODIUM SERPL-SCNC: 140 MMOL/L — SIGNIFICANT CHANGE UP (ref 135–145)
SPECIMEN SOURCE: SIGNIFICANT CHANGE UP
VANCOMYCIN TROUGH SERPL-MCNC: 9.5 UG/ML — LOW (ref 10–20)

## 2021-09-29 PROCEDURE — 99239 HOSP IP/OBS DSCHRG MGMT >30: CPT | Mod: GC

## 2021-09-29 RX ORDER — MAGNESIUM SULFATE 500 MG/ML
2 VIAL (ML) INJECTION ONCE
Refills: 0 | Status: COMPLETED | OUTPATIENT
Start: 2021-09-29 | End: 2021-09-29

## 2021-09-29 RX ADMIN — HEPARIN SODIUM 5000 UNIT(S): 5000 INJECTION INTRAVENOUS; SUBCUTANEOUS at 09:23

## 2021-09-29 RX ADMIN — Medication 325 MILLIGRAM(S): at 21:22

## 2021-09-29 RX ADMIN — ARIPIPRAZOLE 30 MILLIGRAM(S): 15 TABLET ORAL at 09:21

## 2021-09-29 RX ADMIN — Medication 100 MILLIGRAM(S): at 11:01

## 2021-09-29 RX ADMIN — PREGABALIN 500 MICROGRAM(S): 225 CAPSULE ORAL at 09:22

## 2021-09-29 RX ADMIN — Medication 40 MILLIEQUIVALENT(S): at 14:12

## 2021-09-29 RX ADMIN — RISPERIDONE 2 MILLIGRAM(S): 4 TABLET ORAL at 21:22

## 2021-09-29 RX ADMIN — Medication 2: at 12:09

## 2021-09-29 RX ADMIN — CITALOPRAM 30 MILLIGRAM(S): 10 TABLET, FILM COATED ORAL at 21:22

## 2021-09-29 RX ADMIN — Medication 650 MILLIGRAM(S): at 01:08

## 2021-09-29 RX ADMIN — INSULIN GLARGINE 30 UNIT(S): 100 INJECTION, SOLUTION SUBCUTANEOUS at 08:12

## 2021-09-29 RX ADMIN — BUDESONIDE AND FORMOTEROL FUMARATE DIHYDRATE 2 PUFF(S): 160; 4.5 AEROSOL RESPIRATORY (INHALATION) at 20:56

## 2021-09-29 RX ADMIN — Medication 40 MILLIEQUIVALENT(S): at 06:11

## 2021-09-29 RX ADMIN — LATANOPROST 1 DROP(S): 0.05 SOLUTION/ DROPS OPHTHALMIC; TOPICAL at 21:22

## 2021-09-29 RX ADMIN — HEPARIN SODIUM 5000 UNIT(S): 5000 INJECTION INTRAVENOUS; SUBCUTANEOUS at 21:21

## 2021-09-29 RX ADMIN — ALBUTEROL 2 PUFF(S): 90 AEROSOL, METERED ORAL at 20:59

## 2021-09-29 RX ADMIN — RISPERIDONE 2 MILLIGRAM(S): 4 TABLET ORAL at 09:22

## 2021-09-29 RX ADMIN — INSULIN GLARGINE 30 UNIT(S): 100 INJECTION, SOLUTION SUBCUTANEOUS at 21:21

## 2021-09-29 RX ADMIN — Medication 50 GRAM(S): at 11:00

## 2021-09-29 RX ADMIN — ALBUTEROL 2 PUFF(S): 90 AEROSOL, METERED ORAL at 08:50

## 2021-09-29 RX ADMIN — Medication 40 MILLIEQUIVALENT(S): at 09:21

## 2021-09-29 RX ADMIN — CITALOPRAM 30 MILLIGRAM(S): 10 TABLET, FILM COATED ORAL at 09:22

## 2021-09-29 RX ADMIN — Medication 2: at 07:57

## 2021-09-29 RX ADMIN — Medication 100 MILLIGRAM(S): at 21:21

## 2021-09-29 RX ADMIN — Medication 8: at 16:18

## 2021-09-29 RX ADMIN — BUDESONIDE AND FORMOTEROL FUMARATE DIHYDRATE 2 PUFF(S): 160; 4.5 AEROSOL RESPIRATORY (INHALATION) at 08:50

## 2021-09-29 NOTE — PROGRESS NOTE ADULT - SUBJECTIVE AND OBJECTIVE BOX
Date of service: 09-29-21 @ 08:57    Lying in bed in NAD  Right arm edema is resolved  Minimal tenderness; no discharge    ROS: no fever or chills; denies dizziness, no HA, no SOB or cough, no abdominal pain, no diarrhea or constipation; no dysuria, no legs pain    MEDICATIONS  (STANDING):  ARIPiprazole 30 milliGRAM(s) Oral daily  budesonide  80 MICROgram(s)/formoterol 4.5 MICROgram(s) Inhaler 2 Puff(s) Inhalation two times a day  cefTRIAXone Injectable. 1000 milliGRAM(s) IV Push every 24 hours  citalopram 30 milliGRAM(s) Oral <User Schedule>  cyanocobalamin 500 MICROGram(s) Oral daily  dextrose 40% Gel 15 Gram(s) Oral once  dextrose 5%. 1000 milliLiter(s) (100 mL/Hr) IV Continuous <Continuous>  dextrose 5%. 1000 milliLiter(s) (50 mL/Hr) IV Continuous <Continuous>  dextrose 50% Injectable 25 Gram(s) IV Push once  dextrose 50% Injectable 12.5 Gram(s) IV Push once  dextrose 50% Injectable 25 Gram(s) IV Push once  ferrous    sulfate 325 milliGRAM(s) Oral at bedtime  glucagon  Injectable 1 milliGRAM(s) IntraMuscular once  heparin   Injectable 5000 Unit(s) SubCutaneous every 12 hours  influenza   Vaccine 0.5 milliLiter(s) IntraMuscular once  insulin glargine Injectable (LANTUS) 30 Unit(s) SubCutaneous two times a day  insulin lispro (ADMELOG) corrective regimen sliding scale   SubCutaneous three times a day before meals  insulin lispro (ADMELOG) corrective regimen sliding scale   SubCutaneous at bedtime  latanoprost 0.005% Ophthalmic Solution 1 Drop(s) Both EYES at bedtime  potassium chloride    Tablet ER 40 milliEquivalent(s) Oral every 4 hours  risperiDONE   Tablet 2 milliGRAM(s) Oral two times a day  vancomycin  IVPB 1000 milliGRAM(s) IV Intermittent every 12 hours    Vital Signs Last 24 Hrs  T(C): 36.4 (29 Sep 2021 08:51), Max: 37.4 (28 Sep 2021 23:40)  T(F): 97.5 (29 Sep 2021 08:51), Max: 99.3 (28 Sep 2021 23:40)  HR: 77 (29 Sep 2021 08:51) (74 - 84)  BP: 127/47 (29 Sep 2021 08:51) (127/47 - 141/73)  BP(mean): --  RR: 16 (29 Sep 2021 08:51) (16 - 18)  SpO2: 97% (29 Sep 2021 08:51) (97% - 98%)     Physical exam:    Constitutional:  No acute distress  HEENT: NC/AT, EOMI, PERRLA, conjunctivae clear; ears and nose atraumatic  Neck: supple; thyroid not palpable  Back: no tenderness  Respiratory: respiratory effort normal; clear to auscultation  Cardiovascular: S1S2 regular, no murmurs  Abdomen: soft, not tender, not distended, positive BS  Genitourinary: no suprapubic tenderness  Lymphatic: no LN palpable  Musculoskeletal: no muscle tenderness, no joint swelling or tenderness  Extremities: no pedal edema  Right antecubital area of erythema, edema; phlebitis with no discharge - much improved  Neurological/ Psychiatric: AxOx3, judgement and insight normal; moving all extremities  Skin: no rashes; no palpable lesions    Labs: reviewed                        9.4    6.47  )-----------( 208      ( 28 Sep 2021 09:37 )             29.7     09-28    138  |  105  |  10  ----------------------------<  238<H>  3.3<L>   |  26  |  0.90    Ca    7.7<L>      28 Sep 2021 09:37  Phos  1.8     09-28  Mg     1.5     09-28                        9.6    7.62  )-----------( 213      ( 27 Sep 2021 05:48 )             29.8     09-27    139  |  105  |  12  ----------------------------<  263<H>  3.1<L>   |  28  |  0.91    Ca    7.5<L>      27 Sep 2021 05:48  Phos  1.9     09-27  Mg     1.5     09-27    TPro  6.1  /  Alb  2.7<L>  /  TBili  0.5  /  DBili  x   /  AST  7<L>  /  ALT  19  /  AlkPhos  65  09-27                LIVER FUNCTIONS - ( 27 Sep 2021 05:48 )  Alb: 2.7 g/dL / Pro: 6.1 gm/dL / ALK PHOS: 65 U/L / ALT: 19 U/L / AST: 7 U/L / GGT: x           COVID-19 PCR: NotDetec (09-23-21 @ 15:48)      Culture - Blood (collected 25 Sep 2021 18:02)  Source: .Blood Blood-Peripheral  Gram Stain (27 Sep 2021 05:54):    Growth in anaerobic bottle: Gram Positive Cocci in Clusters  Preliminary Report (27 Sep 2021 05:54):    Growth in anaerobic bottle: Gram Positive Cocci in Clusters  Organism: Blood Culture PCR (27 Sep 2021 07:31)      -  Staphylococcus aureus: Detec Any isolate of Staphylococcus aureus from a blood culture is NOT considered a contaminant.      Method Type: PCR    Culture - Blood (collected 25 Sep 2021 18:02)  Source: .Blood Blood-Peripheral  Preliminary Report (27 Sep 2021 07:01):    No growth to date.    Culture - Urine (collected 23 Sep 2021 17:01)  Source: Clean Catch None  Final Report (25 Sep 2021 01:06):    <10,000 CFU/mL Normal Urogenital Hawa    Radiology: all available radiological tests reviewed    < from: US Duplex Venous Upper Ext Ltd, Right (09.26.21 @ 09:24) >  No evidence of right upper extremity deep venous thrombosis.  Thrombosis of the right cephalic vein in the antecubital fossa  Suggestion of edema in the subcutaneous fat at the level of thrombosis, correlate for cellulitis.  < end of copied text >      Advanced directives addressed: full resuscitation

## 2021-09-29 NOTE — PROGRESS NOTE ADULT - SUBJECTIVE AND OBJECTIVE BOX
62 yo obese F with PMHx significant for COPD (dependent on 4L O2 via NC at home), pulmonary HTN, IDDM, COLLEEN on CPAP, and HTN BIBEMS from pulmonologist office due to glucose over 600 despite taking insulin.  Patient currently taking metformin 1000mg, proglitazone 45 mg daily and novolin 120 units BID before meals. Patient is adherent to medication regimen. Patients states that for the past month her glucose levels have been similar to the  glucose on admission and has been experiencing generalized weakness, lethargy, unbalance, xerostomia, polydipsia, polyuria and intermittent palpitations.    Seen and examined this morning, no acute events overnight. Patient stated that she spoke to her  today and was able to take a stand on Stieva packets that she sometimes drinks with her tea. endorses that she spoke to her  today and stated "he called me today and told me that 'i left you that way because you drank diet coke when I told you not to' ". She states that she would like to have someone speak to her and her  about what she can and cant eat with the diabetes so that he can be aware, and tel him that artifical sweetners and diet beverages and sugar alternatives are okay to consume. patient encouraged to keep a food journal and consistently check her sugars so that she can alter her diet as needed. A more in-depth conversation/ diabetes education and meals will be held with both patient an  present. patient states that she will be returning home after discharge and would like a new endocrinologist as the one that she had intended to see once discharged is not currently accepting new patients. patient does not want psych involved in her care during this admission. patient states that there is a stigma on mental illness and she has been dealing with bipolar depression for so long and her job that she is not retired from never needed to know. she endorses that she see a psych doctor as an outpatient whom she knows and trust and doesn't want to talk to anyone else.     REVIEW OF SYSTEMS  General: denies fever, chills  Respiratory and Thorax: no difficulty breathing or chest pain  Cardiovascular: no palpitations or PND, orthopnea  Gastrointestinal: no abdominal pain, normal bowel movement, no dark stools  Genitourinary: no difficulty urinating, no burning urination, endorses urinary frequency  Musculoskeletal: no myalgia/arthrlagia  Neurological: no weakness, numbness, change in gait  Psychiatric: + depression, anxiety  Endocrine: endorses polyuria, polydipsia    PHYSICAL EXAM:  T(C): 36.5 (29 Sep 2021 15:15), Max: 37.4 (28 Sep 2021 23:40)  T(F): 97.7 (29 Sep 2021 15:15), Max: 99.3 (28 Sep 2021 23:40)  HR: 76 (29 Sep 2021 15:15) (74 - 84)  BP: 125/61 (29 Sep 2021 15:15) (125/61 - 141/73)  BP(mean): --  RR: 18 (29 Sep 2021 15:15) (16 - 18)  SpO2: 95% (29 Sep 2021 15:15) (95% - 98%)    General: Obese, lying in bed ; in no acute distress  Head: Normocephalic; atraumatic  Respiratory: No wheezes, rales or rhonchi  Cardiovascular: Regular rate and rhythm. S1 and S2 Normal; No murmurs, gallops or rubs  Gastrointestinal: Soft non-tender non-distended; Normal bowel sounds  Extremities: No clubbing, cyanosis or edema  Vascular: Peripheral pulses palpable 2+ bilaterally  Neurological: Alert and oriented x4  Skin: Warm and dry. Right cubital fossa cellulitis improving, erythema decreased  Musculoskeletal: Normal gait, tone, without deformities  Psychiatric: Cooperative and appropriate                                      9.4    6.47  )-----------( 208      ( 28 Sep 2021 09:37 )             29.7     09-29    140  |  107  |  8   ----------------------------<  193<H>  3.1<L>   |  26  |  0.82    Ca    8.2<L>      29 Sep 2021 08:48  Phos  2.6     09-29  Mg     1.6     09-29        CAPILLARY BLOOD GLUCOSE  POCT Blood Glucose.: 311 mg/dL (29 Sep 2021 16:15)  POCT Blood Glucose.: 197 mg/dL (29 Sep 2021 12:06)  POCT Blood Glucose.: 184 mg/dL (29 Sep 2021 07:40)  POCT Blood Glucose.: 326 mg/dL (28 Sep 2021 21:20)  POCT Blood Glucose.: 312 mg/dL (27 Sep 2021 17:06)  POCT Blood Glucose.: 263 mg/dL (27 Sep 2021 12:34)  POCT Blood Glucose.: 248 mg/dL (27 Sep 2021 07:38)  POCT Blood Glucose.: 348 mg/dL (27 Sep 2021 00:14)  POCT Blood Glucose.: 389 mg/dL (26 Sep 2021 20:50)    LIVER FUNCTIONS - ( 27 Sep 2021 05:48 )  Alb: 2.7 g/dL / Pro: 6.1 gm/dL / ALK PHOS: 65 U/L / ALT: 19 U/L / AST: 7 U/L / GGT: x             MEDICATIONS  (STANDING):  ARIPiprazole 30 milliGRAM(s) Oral daily  budesonide  80 MICROgram(s)/formoterol 4.5 MICROgram(s) Inhaler 2 Puff(s) Inhalation two times a day  cefTRIAXone Injectable. 1000 milliGRAM(s) IV Push every 24 hours  citalopram 30 milliGRAM(s) Oral <User Schedule>  cyanocobalamin 500 MICROGram(s) Oral daily  dextrose 40% Gel 15 Gram(s) Oral once  dextrose 5%. 1000 milliLiter(s) (50 mL/Hr) IV Continuous <Continuous>  dextrose 5%. 1000 milliLiter(s) (100 mL/Hr) IV Continuous <Continuous>  dextrose 50% Injectable 25 Gram(s) IV Push once  dextrose 50% Injectable 12.5 Gram(s) IV Push once  dextrose 50% Injectable 25 Gram(s) IV Push once  ferrous    sulfate 325 milliGRAM(s) Oral at bedtime  glucagon  Injectable 1 milliGRAM(s) IntraMuscular once  heparin   Injectable 5000 Unit(s) SubCutaneous every 12 hours  influenza   Vaccine 0.5 milliLiter(s) IntraMuscular once  insulin glargine Injectable (LANTUS) 30 Unit(s) SubCutaneous two times a day  insulin lispro (ADMELOG) corrective regimen sliding scale   SubCutaneous three times a day before meals  insulin lispro (ADMELOG) corrective regimen sliding scale   SubCutaneous at bedtime  latanoprost 0.005% Ophthalmic Solution 1 Drop(s) Both EYES at bedtime  risperiDONE   Tablet 2 milliGRAM(s) Oral two times a day  vancomycin  IVPB 1000 milliGRAM(s) IV Intermittent every 12 hours    MEDICATIONS  (PRN):  acetaminophen   Tablet .. 650 milliGRAM(s) Oral every 6 hours PRN Mild Pain (1 - 3)  acetaminophen   Tablet .. 650 milliGRAM(s) Oral every 6 hours PRN Temp greater or equal to 38C (100.4F)  ALBUTerol    90 MICROgram(s) HFA Inhaler 2 Puff(s) Inhalation every 6 hours PRN for shortness of breath and/or wheezing    RADIOLOGY; persoanlly visualized  ALl previous medical records personally reviewed

## 2021-09-29 NOTE — PROGRESS NOTE ADULT - ASSESSMENT
60 yo obese F with PMHx significant for COPD (dependent on 4L O2 via NC at home), pulmonary HTN, IDDM, COLLEEN on CPAP, and HTN resenting with hyperglycemia and abnormal electrolytes concerning for HHS    #Hyperglycemia, early HHS  -Glucose >600 on admission down to 128 s/p 25 units lantus and 25 units humulin in ED  -A1c: 11.0  -Blood sugars ranging 187-300s currently  -cw Lantus 30 units BID  -cw iss  - will monitor electrolytes and replete PRN  - K 3.1 -> 3.1 this morning, repleted; Mg repleted  -Continue 40 PO potassium BID    # Severe Hypokalemia, Hypomagnesemia and Hypophosphatemia   - K 3.1, Mg 1.6 both repleted 9/29  -phos 2.6 9/29  -Follow daily BMP, Mg and Phos replete PRN  -Nephrology recs appreciated; no evidence ok K wasting in Urine; most likely due to poor po intake and long term hydrochlorothiazide, continue to replete potassium until whole body stores are replete    #Staph Aureus Bacteremia 2/2 infected IV line  -forearm improving  -Blood cultures positive for Staph Aureus  -Ceftriaxone and Vancomycin dc'd 9/29  -doxycycline 100 mg PO q12h started 9/29 for 7 more days  -ID following, recs appreciated    #Unsafe home environment, Depression, Anxiety  -SW consulted to discuss possible resources and next steps  -patient made aware of resources, but wants to return home  -patient declined to speak to psych states that she follows with outpatient psych doctor that she trust and will not speak to strangers. She states that she has been doing well on her meds for years and does not want to have psych involved during this admission    #ADINA, resolved  -Likely due to dehydration secondary to osmotic diuresis,  -s/p IVF resuscitation  -Monitor    #Hyponatremia   -Resolved, continue to monitor    #Normocytic anemia  - Chronic, possibly related to hx of gastric bypass surgery (2007)  - On iron and B12 supplementation  - Last colonoscopy 2019, normal as per patient    #HTN  - Normotensive  - continue holding Losartan and HCTZ due to ADINA and electrolyte abnormalities on admission   - Monitor BP off medication for now as patients BP remains within normal range    #COLLEEN  - Continue CPAP at bedtime    #COPD, Pulmonary HTN  - Stable  - moderate pulm HTN noted on ECHO (Nov 2018)  - Likely related to underlying COPD, COLLEEN  -cw supplemental O2  - Continue home inhalers    #DVT PPx  - Heparin SQ    #Advanced Directives  - Full Code  - HCP : Daughter Ronit

## 2021-09-29 NOTE — PROGRESS NOTE ADULT - PROVIDER SPECIALTY LIST ADULT
Family Medicine
Infectious Disease
Nephrology
Family Medicine
Hospitalist
Family Medicine
Family Medicine
Hospitalist
Hospitalist
Pulmonology
Infectious Disease
Nephrology
Pulmonology
Pulmonology

## 2021-09-29 NOTE — PROGRESS NOTE ADULT - REASON FOR ADMISSION
Hyperglycemia, Hypokalemia, Hypomagnesemia

## 2021-09-29 NOTE — PROGRESS NOTE ADULT - ATTENDING COMMENTS
60 yo F as described above presenting with hyperglycemia and abnormal electrolytes concerning for DKA    #Early HHS   -Glucose >600 on admission down to 128 s/p 25 units lantus and 25 units humulin in ED. Unclear why such high dose of insulin was given despite severe potassium of 1.7 and risk of hypoglycemia.   -A1c: 11.0  -on Lantus 25u, will increase tonight based on coverage   -c/w ISS   -severe electrolytes derangement, will monitor and replete PRN  -s/p 3 IV RIders and 80 PO potassium  - K currently 3.6  -tolerating PO , no gap   -monitor BGMS  -Diet consistent carbs.  -cw 40 PO potassium BID  -f/u AM BMP, mag and phos and replete PRN
62 yo obese F with PMHx significant for COPD (dependent on 4L O2 via NC at home), pulmonary HTN, IDDM, COLLEEN on CPAP, and HTN resenting with hyperglycemia and abnormal electrolytes concerning for HHS    #Hyperglycemia, early HHS  -Glucose >600 on admission down to 128 s/p 25 units lantus and 25 units humulin in ED  -A1c: 11.0  -Blood sugars ranging 187-300s currently  -cw Lantus 30 units BID  -cw iss  - will monitor electrolytes and replete PRN  - K 3.1 -> 3.1 this morning, repleted; Mg repleted  -Continue 40 PO potassium BID
60 yo F as described above presenting with hyperglycemia and abnormal electrolytes concerning for DKA    #Early HHS   -Glucose >600 on admission down to 128 s/p 25 units lantus and 25 units humulin in ED. Unclear why such high dose of insulin was given despite severe potassium of 1.7 and risk of hypoglycemia.   -s/p IV fluids, multiple K-riders   -A1c: 11.0  -on Lantus 25u, will increase tonight based on coverage   -c/w ISS   -severe electrolytes derangement, will monitor and replete PRN  -tolerating PO , no gap   -monitor BGMS  -Diet consistent carbs.  -f/u 5PM BMP, mag and phos and replete PRN

## 2021-09-29 NOTE — PROGRESS NOTE ADULT - ASSESSMENT
62 y/o obese Female with h/o COPD (dependent on 4L O2 via NC at home), pulmonary HTN, IDDM, COLLEEN on CPAP, and HTN was admitted on 9/23 from pulmonologist office due to glucose over 600 despite taking insulin. Patient was on metformin 1000mg, proglitazone 45 mg daily and novolin 120 units BID before meals. Patients states that for the past month her glucose levels have been similar to the  glucose on admission and has been experiencing generalized weakness, lethargy, unbalance, xerostomia, polydipsia, polyuria and intermittent palpitations. Patient was noted with phlebitis and BC from 9/25 were reported with bacteremia. She received ceftriaxone IV.     1. Sepsis with STAU. Right forearm cellulitis and phlebitis. Obesity. DM type 2 poorly controlled.  -low grade fever  -forearm is improving   -right arm phlebitis site culture pending  -BC x 2 noted  -on vancomycin 1 mg IV q12h and ceftriaxone 1 gm IV qd # 3  -tolerating abx well so far; no side effects noted  -local wound care  -change abx to doxycycline 100 mg PO q12h for 7 more days  -f/u cultures  -monitor temps  -f/u CBC  -supportive care  2. Other issues:   -care per medicine    d/w Dr. MARIANNE Tolentino

## 2021-09-30 VITALS
DIASTOLIC BLOOD PRESSURE: 75 MMHG | HEART RATE: 97 BPM | OXYGEN SATURATION: 100 % | SYSTOLIC BLOOD PRESSURE: 100 MMHG | RESPIRATION RATE: 18 BRPM | TEMPERATURE: 98 F

## 2021-09-30 LAB
ALBUMIN SERPL ELPH-MCNC: 2.3 G/DL — LOW (ref 3.3–5)
ALP SERPL-CCNC: 78 U/L — SIGNIFICANT CHANGE UP (ref 40–120)
ALT FLD-CCNC: 49 U/L — SIGNIFICANT CHANGE UP (ref 12–78)
ANION GAP SERPL CALC-SCNC: 6 MMOL/L — SIGNIFICANT CHANGE UP (ref 5–17)
AST SERPL-CCNC: 45 U/L — HIGH (ref 15–37)
BILIRUB SERPL-MCNC: 0.4 MG/DL — SIGNIFICANT CHANGE UP (ref 0.2–1.2)
BUN SERPL-MCNC: 7 MG/DL — SIGNIFICANT CHANGE UP (ref 7–23)
CALCIUM SERPL-MCNC: 7.9 MG/DL — LOW (ref 8.5–10.1)
CHLORIDE SERPL-SCNC: 108 MMOL/L — SIGNIFICANT CHANGE UP (ref 96–108)
CO2 SERPL-SCNC: 26 MMOL/L — SIGNIFICANT CHANGE UP (ref 22–31)
CREAT SERPL-MCNC: 0.8 MG/DL — SIGNIFICANT CHANGE UP (ref 0.5–1.3)
GLUCOSE SERPL-MCNC: 255 MG/DL — HIGH (ref 70–99)
HCT VFR BLD CALC: 29.5 % — LOW (ref 34.5–45)
HGB BLD-MCNC: 8.9 G/DL — LOW (ref 11.5–15.5)
MAGNESIUM SERPL-MCNC: 1.7 MG/DL — SIGNIFICANT CHANGE UP (ref 1.6–2.6)
MCHC RBC-ENTMCNC: 26.3 PG — LOW (ref 27–34)
MCHC RBC-ENTMCNC: 30.2 GM/DL — LOW (ref 32–36)
MCV RBC AUTO: 87.3 FL — SIGNIFICANT CHANGE UP (ref 80–100)
OSMOLALITY SERPL: 297 MOSMOL/KG — SIGNIFICANT CHANGE UP (ref 275–300)
PHOSPHATE SERPL-MCNC: 2.9 MG/DL — SIGNIFICANT CHANGE UP (ref 2.5–4.5)
PLATELET # BLD AUTO: 210 K/UL — SIGNIFICANT CHANGE UP (ref 150–400)
POTASSIUM SERPL-MCNC: 3.9 MMOL/L — SIGNIFICANT CHANGE UP (ref 3.5–5.3)
POTASSIUM SERPL-SCNC: 3.9 MMOL/L — SIGNIFICANT CHANGE UP (ref 3.5–5.3)
PROT SERPL-MCNC: 6.1 GM/DL — SIGNIFICANT CHANGE UP (ref 6–8.3)
RBC # BLD: 3.38 M/UL — LOW (ref 3.8–5.2)
RBC # FLD: 13.2 % — SIGNIFICANT CHANGE UP (ref 10.3–14.5)
SODIUM SERPL-SCNC: 140 MMOL/L — SIGNIFICANT CHANGE UP (ref 135–145)
WBC # BLD: 4.8 K/UL — SIGNIFICANT CHANGE UP (ref 3.8–10.5)
WBC # FLD AUTO: 4.8 K/UL — SIGNIFICANT CHANGE UP (ref 3.8–10.5)

## 2021-09-30 PROCEDURE — 99233 SBSQ HOSP IP/OBS HIGH 50: CPT | Mod: GC

## 2021-09-30 RX ORDER — HYDROXYZINE HCL 10 MG
2 TABLET ORAL
Qty: 0 | Refills: 0 | DISCHARGE

## 2021-09-30 RX ORDER — HUMAN INSULIN 100 [IU]/ML
120 INJECTION, SUSPENSION SUBCUTANEOUS
Qty: 0 | Refills: 0 | DISCHARGE

## 2021-09-30 RX ORDER — TRIAMTERENE/HYDROCHLOROTHIAZID 75 MG-50MG
1 TABLET ORAL
Qty: 0 | Refills: 0 | DISCHARGE

## 2021-09-30 RX ORDER — INSULIN LISPRO 100/ML
5 VIAL (ML) SUBCUTANEOUS
Qty: 2 | Refills: 0
Start: 2021-09-30 | End: 2021-10-13

## 2021-09-30 RX ORDER — POTASSIUM CHLORIDE 20 MEQ
40 PACKET (EA) ORAL ONCE
Refills: 0 | Status: COMPLETED | OUTPATIENT
Start: 2021-09-30 | End: 2021-09-30

## 2021-09-30 RX ORDER — POTASSIUM CHLORIDE 20 MEQ
2 PACKET (EA) ORAL
Qty: 57 | Refills: 0
Start: 2021-09-30 | End: 2021-10-13

## 2021-09-30 RX ORDER — ENOXAPARIN SODIUM 100 MG/ML
36 INJECTION SUBCUTANEOUS
Qty: 14 | Refills: 0
Start: 2021-09-30 | End: 2021-10-13

## 2021-09-30 RX ORDER — INSULIN GLARGINE 100 [IU]/ML
36 INJECTION, SOLUTION SUBCUTANEOUS
Refills: 0 | Status: DISCONTINUED | OUTPATIENT
Start: 2021-09-30 | End: 2021-09-30

## 2021-09-30 RX ORDER — INSULIN GLARGINE 100 [IU]/ML
36 INJECTION, SOLUTION SUBCUTANEOUS
Qty: 14 | Refills: 0
Start: 2021-09-30 | End: 2021-10-13

## 2021-09-30 RX ADMIN — BUDESONIDE AND FORMOTEROL FUMARATE DIHYDRATE 2 PUFF(S): 160; 4.5 AEROSOL RESPIRATORY (INHALATION) at 08:45

## 2021-09-30 RX ADMIN — Medication 100 MILLIGRAM(S): at 10:33

## 2021-09-30 RX ADMIN — PREGABALIN 500 MICROGRAM(S): 225 CAPSULE ORAL at 10:33

## 2021-09-30 RX ADMIN — Medication 40 MILLIEQUIVALENT(S): at 18:22

## 2021-09-30 RX ADMIN — RISPERIDONE 2 MILLIGRAM(S): 4 TABLET ORAL at 10:33

## 2021-09-30 RX ADMIN — Medication 2: at 12:13

## 2021-09-30 RX ADMIN — ALBUTEROL 2 PUFF(S): 90 AEROSOL, METERED ORAL at 08:44

## 2021-09-30 RX ADMIN — Medication 2: at 17:42

## 2021-09-30 RX ADMIN — INSULIN GLARGINE 30 UNIT(S): 100 INJECTION, SOLUTION SUBCUTANEOUS at 08:38

## 2021-09-30 RX ADMIN — ARIPIPRAZOLE 30 MILLIGRAM(S): 15 TABLET ORAL at 10:33

## 2021-09-30 RX ADMIN — Medication 4: at 08:36

## 2021-09-30 RX ADMIN — INFLUENZA VIRUS VACCINE 0.5 MILLILITER(S): 15; 15; 15; 15 SUSPENSION INTRAMUSCULAR at 18:27

## 2021-09-30 RX ADMIN — CITALOPRAM 30 MILLIGRAM(S): 10 TABLET, FILM COATED ORAL at 10:33

## 2021-09-30 NOTE — DISCHARGE NOTE PROVIDER - CARE PROVIDER_API CALL
Jak Castillo)  Hematology; Internal Medicine  [address]  [place] [address]  Phone: [phone]  Fax: [phone]  Established Patient  Follow Up Time: 1-3 days    Arline Yost)  EndocrinologyMetabDiabetes; Internal Medicine  [address] [place] [address]  Phone: [phone]  Fax: [phone]  Follow Up Time: Routine

## 2021-09-30 NOTE — DISCHARGE NOTE PROVIDER - NSDCCPCAREPLAN_GEN_ALL_CORE_FT
PRINCIPAL DISCHARGE DIAGNOSIS  Diagnosis: Hyperosmolar hyperglycemic state (HHS)  Assessment and Plan of Treatment: you were admitted to the hospital becuase of high blood sugar caused by Hyperosmolar Hyperglycemix state (HHS). HHS occurs when the blood sugar of a person with diabetes becomes too high (hyperglycemia) for a long time. The extra sugar is passed into the urine, which causes the person to urinate frequently. As a result, he or she loses a lot of fluid, which can lead to severe dehydration (extreme thirst).   Symptoms of HHS usually come on slowly, and can take days or weeks to develop. Symptoms include:  High blood sugar level (over 600 mg/dL).  Confusion, hallucinations, drowsiness or passing out.  Dry mouth and extreme thirst that may eventually get better.  Frequent urination.  Fever over 100.4 degrees Fahrenheit.  Blurred vision or loss of vision.  Weakness or paralysis that may be worse on one side of the body.  You should seek medical attention right away if you are diabetic and you have these symptoms:  Extreme thirst.  Frequent urination.  Confusion, or a change in your mental state.  Changes in your vision.      SECONDARY DISCHARGE DIAGNOSES  Diagnosis: Hypokalemia  Assessment and Plan of Treatment: while you were treated in the [facility] you were found to have low potassium. This is most likely a problem that stems from lack of potassium intake and poor gut absorption and long term once of one of your medication. If you have hypokalemia, that means you have low levels of potassium in your blood. Potassium is a mineral your body needs to work normally. It helps muscles to move, cells to get the nutrients they need, and nerves to send their signals. It’s especially important for cells in your heart. It also helps keep your blood pressure from getting too high.  please make sure that you are following with your primary care provider and checking your electrolytes in the next few days. Please continue to take your Potassium supplements daily.    Diagnosis: Hypomagnesemia  Assessment and Plan of Treatment: while you were being treated in the hospital you magnesium kept dropping. magenesium is important for the body and it is important that you try to keep it within normal range.    Diagnosis: Type 2 diabetes mellitus  Assessment and Plan of Treatment: you were admitted to the hospital because of HHS, a complication of uncontrolled type 2 diabetes. it is important that you are taking your medication regularly. I is just as important that you are following a strictly diabetic diet, and consistent checking your sugars. The ideal schedule for checking you blood sugar would be 2-3 times a day every day of the week. However if that is not possible please try to check your sugars at least once a day at different times every day to get a more accurate picture of the range of your sugars. Please keep a record of all of the sugars when you check them to best help to manage your insulin therapy and bring it with you for every visit. Food diary can help to keep track of the foods that might be raising your blood sugars too high. please make notes of what foods you cannot tolerate or make your sugars go higher than normal.     PRINCIPAL DISCHARGE DIAGNOSIS  Diagnosis: Hyperosmolar hyperglycemic state (HHS)  Assessment and Plan of Treatment: you were admitted to the hospital becuase of high blood sugar caused by Hyperosmolar Hyperglycemix state (HHS). HHS occurs when the blood sugar of a person with diabetes becomes too high (hyperglycemia) for a long time. The extra sugar is passed into the urine, which causes the person to urinate frequently. As a result, he or she loses a lot of fluid, which can lead to severe dehydration (extreme thirst).   Symptoms of HHS usually come on slowly, and can take days or weeks to develop. Symptoms include:  High blood sugar level (over 600 mg/dL).  Confusion, hallucinations, drowsiness or passing out.  Dry mouth and extreme thirst that may eventually get better.  Frequent urination.  Fever over 100.4 degrees Fahrenheit.  Blurred vision or loss of vision.  Weakness or paralysis that may be worse on one side of the body.  You should seek medical attention right away if you are diabetic and you have these symptoms:  Extreme thirst.  Frequent urination.  Confusion, or a change in your mental state.  Changes in your vision.      SECONDARY DISCHARGE DIAGNOSES  Diagnosis: Hypokalemia  Assessment and Plan of Treatment: [facility] found to have low potassium. This is most likely a problem that stems from lack of potassium intake and poor gut absorption and long term once of one of your medication. If you have hypokalemia, that means you have low levels of potassium in your blood. Potassium is a mineral your body needs to work normally. It helps muscles to move, cells to get the nutrients they need, and nerves to send their signals. It’s especially important for cells in your heart. It also helps keep your blood pressure from getting too high.  We held your hypertension medication while you were in the hospital. your blood pressure was monitored and you did not require any medication to help lower your blood pressure. it is important that you do not take your losartan and hydrocholorothiazide for the time being as those two medications shoudl not be taken with potassium supplements.  please make sure that you are following with your primary care provider and checking your electrolytes in the next few days, and monitor your blood pressures while you are off of your blood pressure medication. Please continue to take your Potassium supplements daily.    Diagnosis: Hypomagnesemia  Assessment and Plan of Treatment: while you were being treated in the hospital you magnesium kept dropping. magenesium is important for the body and it is important that you try to keep it within normal range.    Diagnosis: Type 2 diabetes mellitus  Assessment and Plan of Treatment: you were admitted to the hospital because of HHS, a complication of uncontrolled type 2 diabetes. it is important that you are taking your medication regularly. I is just as important that you are following a strictly diabetic diet, and consistent checking your sugars. The ideal schedule for checking you blood sugar would be 2-3 times a day every day of the week. However if that is not possible please try to check your sugars at least once a day at different times every day to get a more accurate picture of the range of your sugars. Please keep a record of all of the sugars when you check them to best help to manage your insulin therapy and bring it with you for every visit. Food diary can help to keep track of the foods that might be raising your blood sugars too high. please make notes of what foods you cannot tolerate or make your sugars go higher than normal.

## 2021-09-30 NOTE — DISCHARGE NOTE NURSING/CASE MANAGEMENT/SOCIAL WORK - PATIENT PORTAL LINK FT
You can access the FollowMyHealth Patient Portal offered by [facility] by registering at the following website: [email or web address]. By joining [employer]’s FollowMyHealth portal, you will also be able to view your health information using other applications (apps) compatible with our system.

## 2021-09-30 NOTE — DISCHARGE NOTE PROVIDER - PROVIDER TOKENS
PROVIDER:[TOKEN:[[id number]],FOLLOWUP:[1-3 days],ESTABLISHEDPATIENT:[T]],PROVIDER:[TOKEN:[[id number]],FOLLOWUP:[Routine]]

## 2021-09-30 NOTE — DISCHARGE NOTE PROVIDER - NSDCMRMEDTOKEN_GEN_ALL_CORE_FT
albuterol 90 mcg/inh inhalation aerosol: 2 puff(s) inhaled 4 times a day, As Needed - for shortness of breath and/or wheezing  ALPRAZolam 2 mg oral tablet: 1 tab(s) orally 4 times a day  ARIPiprazole 15 mg oral tablet: 2 tab(s) orally once a day  Breo Ellipta 100 mcg-25 mcg/inh inhalation powder: 1 puff(s) inhaled once a day  citalopram 10 mg oral tablet: 3 tab(s) orally 2 times a day  cyanocobalamin 500 mcg oral tablet: 1 tab(s) orally once a day  ferrous sulfate 325 mg (65 mg elemental iron) oral tablet: 1 tab(s) orally once a day (at bedtime)  Fiber Tabs 625 mg oral tablet: 2 tab(s) orally 2 times a day  hydrOXYzine pamoate 50 mg oral capsule: 2 cap(s) orally 2 times a day  latanoprost 0.005% ophthalmic solution: 1 drop(s) in each eye once a day (in the evening)  losartan 100 mg oral tablet: 1 tab(s) orally once a day  metFORMIN 1000 mg oral tablet: 1 tab(s) orally 2 times a day  NovoLIN N 100 units/mL subcutaneous suspension: 120 unit(s) subcutaneous 2 times a day (before meals)  Pfizer-BioNTech COVID-19 Vaccine PF 30 mcg/0.3 mL intramuscular suspension: 0.3 milliliter(s) intramuscular once  *****Course Completed as of 05/21***  pioglitazone 45 mg oral tablet: 1 tab(s) orally once a day  risperiDONE 2 mg oral tablet: 1 tab(s) orally 2 times a day  triamterene-hydrochlorothiazide 37.5 mg-25 mg oral capsule: 1 cap(s) orally once a day   albuterol 90 mcg/inh inhalation aerosol: 2 puff(s) inhaled 4 times a day, As Needed - for shortness of breath and/or wheezing  ALPRAZolam 2 mg oral tablet: 1 tab(s) orally 4 times a day  ARIPiprazole 15 mg oral tablet: 2 tab(s) orally once a day  Breo Ellipta 100 mcg-25 mcg/inh inhalation powder: 1 puff(s) inhaled once a day  citalopram 10 mg oral tablet: 3 tab(s) orally 2 times a day  cyanocobalamin 500 mcg oral tablet: 1 tab(s) orally once a day  ferrous sulfate 325 mg (65 mg elemental iron) oral tablet: 1 tab(s) orally once a day (at bedtime)  Fiber Tabs 625 mg oral tablet: 2 tab(s) orally 2 times a day  hydrOXYzine pamoate 50 mg oral capsule: 2 cap(s) orally 2 times a day  insulin lispro 100 units/mL injectable solution: 5 unit(s) injectable 3 times a day before each meal  Lantus Solostar Pen 100 units/mL subcutaneous solution: 36 unit(s) subcutaneous 2 times a day   latanoprost 0.005% ophthalmic solution: 1 drop(s) in each eye once a day (in the evening)  losartan 100 mg oral tablet: 1 tab(s) orally once a day  metFORMIN 1000 mg oral tablet: 1 tab(s) orally 2 times a day  Pfizer-BioNTech COVID-19 Vaccine PF 30 mcg/0.3 mL intramuscular suspension: 0.3 milliliter(s) intramuscular once  *****Course Completed as of 05/21***  pioglitazone 45 mg oral tablet: 1 tab(s) orally once a day  risperiDONE 2 mg oral tablet: 1 tab(s) orally 2 times a day   albuterol 90 mcg/inh inhalation aerosol: 2 puff(s) inhaled 4 times a day, As Needed - for shortness of breath and/or wheezing  ALPRAZolam 2 mg oral tablet: 1 tab(s) orally 4 times a day  ARIPiprazole 15 mg oral tablet: 2 tab(s) orally once a day  Breo Ellipta 100 mcg-25 mcg/inh inhalation powder: 1 puff(s) inhaled once a day  citalopram 10 mg oral tablet: 3 tab(s) orally 2 times a day  cyanocobalamin 500 mcg oral tablet: 1 tab(s) orally once a day  doxycycline monohydrate 100 mg oral capsule: 1 cap(s) orally every 12 hours  ferrous sulfate 325 mg (65 mg elemental iron) oral tablet: 1 tab(s) orally once a day (at bedtime)  Fiber Tabs 625 mg oral tablet: 2 tab(s) orally 2 times a day  insulin lispro 100 units/mL injectable solution: 5 unit(s) injectable 3 times a day before each meal  K-Tab 20 mEq oral tablet, extended release: 2 tab(s) orally 2 times a day   Lantus 100 units/mL subcutaneous solution: 36 unit(s) subcutaneous 2 times a day   latanoprost 0.005% ophthalmic solution: 1 drop(s) in each eye once a day (in the evening)  metFORMIN 1000 mg oral tablet: 1 tab(s) orally 2 times a day  Pfizer-BioNTech COVID-19 Vaccine PF 30 mcg/0.3 mL intramuscular suspension: 0.3 milliliter(s) intramuscular once  *****Course Completed as of 05/21***  pioglitazone 45 mg oral tablet: 1 tab(s) orally once a day  risperiDONE 2 mg oral tablet: 1 tab(s) orally 2 times a day

## 2021-09-30 NOTE — DISCHARGE NOTE PROVIDER - NSDCCAREPROVSEEN_GEN_ALL_CORE_FT
Feliciano, Paola Baron, Mima Chávez, Feng Bull, Stella Blount, Aleena Orozco, Philip Sorto, Roderick Fletcher, Osmar Martins, Wendy Duran, Cole Wilkins, Garth Justice, Wan DUNBAR

## 2021-09-30 NOTE — DISCHARGE NOTE PROVIDER - HOSPITAL COURSE
60yo Female with a pmhx COPD (dependent on 4L O2 via NC at home), pulmonary HTN, IDDM, COLLEEN on CPAP, and HTN .Presented to [facility]  with a chief complaint of Hyperglycemia, Hypokalemia, Hypomagnesemia on [id number] from pulmonologist office due to glucose higher than 600 despite taking insulin.  Patient states that she was currently taking metformin 1000mg, proglitazone 45 mg daily and novolin 120 units BID before meals. In the ED, Patient was found to have POCT glucose >600, cbc wnl, Na 130, K 1.7, Cl 90, Cr 1.98, Anion gap 10, Glucose 663, Mg 1.3, lactate 2.0, small amount of acetone in blood serum, ALC of 11.0, trop (-) , EKG 9/23: sinus rhythm with rate of 82, incomplete RBBB, and QTC 649ms. UA showed pH 6.5 with trace ketones. Patient received 25 units of lantus and 25 units of humulin in the ED and mg and phos repleted. Patient was given 2L bolus. Patient was admitted to CICU where she was started on dextrose 5% +NaCL with KCl 20 meq 1L infused at 75cc/hr. Patient showed ADINA on presentation treated with iv hydration and nephrotoxic agents, and creatinine was trended. Patient was placed on 40mg KCl and IV potassium chloride x3 daily. Repeat EKG on 9/24 showed QTC of  634ms and continued to clinically improve, last QTC on 9/26 was 489. Patient's admission was complicated by IV infiltration, US Doppler on right upper extremity: No evidence of right upper extremity deep venous thrombosis. Thrombosis of the right cephalic vein in the antecubital fossa, Suggestion of edema in the subcutaneous fat at the level of thrombosis, correlate for cellulitis. Patient was started on ceftriaxone IV, which showed clinical improvement and patient was switched to PO doxycyline 100mg q12. Patient was downgraded from CICU and transferred to med-surg 2S. Patient management complicated by depleted body stores of K most likely due to PO intake and long term Hydrochlorothiazide use. hydrochlorothiazide held during admission however patient continued to loss potassium daily even after 3 doses IV Potassium and concurrent PO supplementation. Nephrology was consulted to determine possible etiology. Urine studies showed no evidence of potassium wasting in the urine. patient Lantus was increased to 30 BID to managed blood sugars. patient  eventually showed clinical improvement with sugars ranging from 180-300s. Patient will be discharged on 5 pre meals and 36 Lantus. During admission endorsed an unsafe home environment, patient was seen by SW who informed patient of many different resources that could be available to her. Patient is medically clear for discharged K 3.9, Mg repleted, blood glucose 196 today and patient is medically optimized for discharge today. Patient made aware and has decided to return home, she will need outpatient follow up with endocrinologist, and PCP. patient will need to continue to take oral supplementation of potassium and magnesium after discharge patient will continue 36 Lantus daily and 5 pre-meal and follow up with endocrinologist or PCP within next few days to adjust insulin therapy and recheck sugars.     Subjective: Patient was seen and examined by me this morning at bedside. no acute events over night, potassium 3.9 and wnl, magnesium 1.9 and repleted. Patient agreeable to be discharged and medically optimized.     REVIEW OF SYSTEMS:  CONSTITUTIONAL: No weakness, fevers or chills  EYES/ENT: No visual changes;  No vertigo or throat pain   NECK: No pain or stiffness  RESPIRATORY: No cough, wheezing, hemoptysis; No shortness of breath  CARDIOVASCULAR: No chest pain or palpitations  GASTROINTESTINAL: No abdominal or epigastric pain. No nausea, vomiting; No diarrhea or constipation.   GENITOURINARY: No dysuria, frequency or hematuria  NEUROLOGICAL: No numbness or weakness  SKIN: No itching, burning, rashes, or lesions     PHYSICAL EXAM:  Vital Signs Last 24 Hrs  T(C): 36.9 (30 Sep 2021 07:35), Max: 36.9 (29 Sep 2021 23:35)  T(F): 98.5 (30 Sep 2021 07:35), Max: 98.5 (30 Sep 2021 07:35)  HR: 77 (30 Sep 2021 07:35) (72 - 77)  BP: 103/51 (30 Sep 2021 07:35) (103/51 - 125/61)  BP(mean): --  RR: 17 (30 Sep 2021 07:35) (17 - 18)  SpO2: 98% (30 Sep 2021 07:35) (95% - 98%)    Constitutional: Pt lying in bed, awake and alert, NAD  HEENT: EOMI, normal hearing, moist mucous membranes  Neck: Soft and supple, no JVD  Respiratory: CTAB, No wheezing, rales or rhonchi  Cardiovascular: S1S2+, RRR, no M/G/R  Gastrointestinal: BS+, soft, NT/ND, no guarding, no rebound  Extremities: No peripheral edema  Vascular: 2+ peripheral pulses  Neurological: AAOx3, no focal deficits  Musculoskeletal: 5/5 strength b/l upper and lower extremities  Skin: No rashes     60yo Female with a pmhx COPD (dependent on 4L O2 via NC at home), pulmonary HTN, IDDM, COLLEEN on CPAP, and HTN .Presented to [facility]  with a chief complaint of Hyperglycemia, Hypokalemia, Hypomagnesemia on [id number] from pulmonologist office due to glucose higher than 600 despite taking insulin.  Patient states that she was currently taking metformin 1000mg, proglitazone 45 mg daily and novolin 120 units BID before meals. In the ED, Patient was found to have POCT glucose >600, cbc wnl, Na 130, K 1.7, Cl 90, Cr 1.98, Anion gap 10, Glucose 663, Mg 1.3, lactate 2.0, small amount of acetone in blood serum, ALC of 11.0, trop (-) , EKG 9/23: sinus rhythm with rate of 82, incomplete RBBB, and QTC 649ms. UA showed pH 6.5 with trace ketones. Patient received 25 units of lantus and 25 units of humulin in the ED and mg and phos repleted. Patient was given 2L bolus. Patient was admitted to CICU where she was started on dextrose 5% +NaCL with KCl 20 meq 1L infused at 75cc/hr. Patient showed ADINA on presentation treated with iv hydration and nephrotoxic agents, and creatinine was trended. Patient was placed on 40mg KCl and IV potassium chloride x3 daily. Repeat EKG on 9/24 showed QTC of  634ms and continued to clinically improve, last QTC on 9/26 was 489. Patient's admission was complicated by IV infiltration, US Doppler on right upper extremity: No evidence of right upper extremity deep venous thrombosis. Thrombosis of the right cephalic vein in the antecubital fossa, Suggestion of edema in the subcutaneous fat at the level of thrombosis, correlate for cellulitis. Patient was started on ceftriaxone IV, which showed clinical improvement and patient was switched to PO doxycyline 100mg q12. Patient was downgraded from CICU and transferred to med-surg 2S. Patient management complicated by depleted body stores of K most likely due to PO intake and long term Hydrochlorothiazide use. hydrochlorothiazide held during admission however patient continued to loss potassium daily even after 3 doses IV Potassium and concurrent PO supplementation. Nephrology was consulted to determine possible etiology. Urine studies showed no evidence of potassium wasting in the urine. patient Lantus was increased to 30 BID to managed blood sugars. patient  eventually showed clinical improvement with sugars ranging from 180-300s. Patient will be discharged on 5 pre meals and 36 Lantus. During admission endorsed an unsafe home environment, patient was seen by RODDY who informed patient of many different resources that could be available to her. Patient is medically clear for discharged K 3.9, Mg repleted, blood glucose 196 today and patient is medically optimized for discharge today. Patient made aware and has decided to return home, she will need outpatient follow up with endocrinologist, and PCP. patient will need to continue to take oral supplementation of potassium and magnesium after discharge patient will continue 36 Lantus BID and 5 units short acting pre-meal and follow up with endocrinologist or PCP within next few days to adjust insulin therapy and recheck sugars.     Subjective: Patient was seen and examined by me this morning at bedside. no acute events over night, potassium 3.9 and wnl, magnesium 1.9 and repleted. Patient agreeable to be discharged and currently medically optimized.     REVIEW OF SYSTEMS:  CONSTITUTIONAL: No weakness, fevers or chills  EYES/ENT: No visual changes;  No vertigo or throat pain   NECK: No pain or stiffness  RESPIRATORY: No cough, wheezing, hemoptysis; No shortness of breath  CARDIOVASCULAR: No chest pain or palpitations  GASTROINTESTINAL: No abdominal or epigastric pain. No nausea, vomiting; No diarrhea or constipation.   GENITOURINARY: No dysuria, frequency or hematuria  NEUROLOGICAL: No numbness or weakness  SKIN: No itching, burning, rashes, or lesions     PHYSICAL EXAM:  Vital Signs Last 24 Hrs  T(C): 36.9 (30 Sep 2021 07:35), Max: 36.9 (29 Sep 2021 23:35)  T(F): 98.5 (30 Sep 2021 07:35), Max: 98.5 (30 Sep 2021 07:35)  HR: 77 (30 Sep 2021 07:35) (72 - 77)  BP: 103/51 (30 Sep 2021 07:35) (103/51 - 125/61)  BP(mean): --  RR: 17 (30 Sep 2021 07:35) (17 - 18)  SpO2: 98% (30 Sep 2021 07:35) (95% - 98%)    Constitutional: Pt lying in bed, awake and alert, NAD  HEENT: EOMI, normal hearing, moist mucous membranes  Neck: Soft and supple, no JVD  Respiratory: CTAB, No wheezing, rales or rhonchi  Cardiovascular: S1S2+, RRR, no M/G/R  Gastrointestinal: BS+, soft, NT/ND, no guarding, no rebound  Extremities: No peripheral edema  Vascular: 2+ peripheral pulses  Neurological: AAOx3, no focal deficits  Musculoskeletal: 5/5 strength b/l upper and lower extremities  Skin: cellulitis on R cubital fossa resolving     62yo Female with a pmhx COPD (dependent on 4L O2 via NC at home), pulmonary HTN, IDDM, COLLEEN on CPAP, and HTN .Presented to [facility]  with a chief complaint of Hyperglycemia, Hypokalemia, Hypomagnesemia on [id number] from pulmonologist office due to glucose higher than 600 despite taking insulin.  Patient states that she was currently taking metformin 1000mg, proglitazone 45 mg daily and novolin 120 units BID before meals. In the ED, Patient was found to have POCT glucose >600, cbc wnl, Na 130, K 1.7, Cl 90, Cr 1.98, Anion gap 10, Glucose 663, Mg 1.3, lactate 2.0, small amount of acetone in blood serum, ALC of 11.0, trop (-) , EKG 9/23: sinus rhythm with rate of 82, incomplete RBBB, and QTC 649ms. UA showed pH 6.5 with trace ketones. Patient received 25 units of lantus and 25 units of humulin in the ED and mg and phos repleted. Patient was given 2L bolus. Patient was admitted to CICU where she was started on dextrose 5% +NaCL with KCl 20 meq 1L infused at 75cc/hr. Patient showed ADINA on presentation treated with iv hydration and nephrotoxic agents, and creatinine was trended. Patient was placed on 40mg KCl and IV potassium chloride x3 daily. Repeat EKG on 9/24 showed QTC of  634ms and continued to clinically improve, last QTC on 9/26 was 489. Patient's admission was complicated by IV infiltration, US Doppler on right upper extremity: No evidence of right upper extremity deep venous thrombosis. Thrombosis of the right cephalic vein in the antecubital fossa, Suggestion of edema in the subcutaneous fat at the level of thrombosis, correlate for cellulitis. Patient was started on ceftriaxone IV, which showed clinical improvement and patient was switched to PO doxycyline 100mg q12. Patient was downgraded from CICU and transferred to med-surg 2S. Patient management complicated by depleted body stores of K most likely due to PO intake and long term Hydrochlorothiazide use. Hydrochlorothiazide and losartan held during admission due to potassium imbalance, patients blood pressure remained stable even without treatment with antihypertensive medications. However, patient continued to lose potassium daily even after 3 doses IV Potassium and concurrent PO supplementation. Nephrology was consulted to determine possible etiology. Urine studies showed no evidence of potassium wasting in the urine. patient Lantus was increased to 30 BID to managed blood sugars. patient eventually showed clinical improvement with sugars ranging from 180-300s. Patient will be discharged on 5 pre meals and 36 Lantus. During admission endorsed an unsafe home environment, patient was seen by SW who informed patient of many different resources that could be available to her. Patient is medically clear for discharged K 3.9, Mg repleted, blood glucose 196 today and patient is medically optimized for discharge today. Patient made aware and has decided to return home, she will need outpatient follow up with endocrinologist, and PCP. patient will need to continue to take oral supplementation of potassium and magnesium after discharge patient will continue 36 Lantus BID and 5 units short acting pre-meal and follow up with endocrinologist or PCP within next few days to adjust insulin therapy and recheck sugars. Hold antihypertensive medication as outpatient in setting of potassium supplements for persistent hypokalemia, and stable blood pressures without medical treatment or intervention. patient will need follow up with pcp to recheck potassium levels and monitor blood pressure.     Subjective: Patient was seen and examined by me this morning at bedside. no acute events over night, potassium 3.9 and wnl, magnesium 1.9 and repleted. Patient agreeable to be discharged and currently medically optimized.     REVIEW OF SYSTEMS:  CONSTITUTIONAL: No weakness, fevers or chills  EYES/ENT: No visual changes;  No vertigo or throat pain   NECK: No pain or stiffness  RESPIRATORY: No cough, wheezing, hemoptysis; No shortness of breath  CARDIOVASCULAR: No chest pain or palpitations  GASTROINTESTINAL: No abdominal or epigastric pain. No nausea, vomiting; No diarrhea or constipation.   GENITOURINARY: No dysuria, frequency or hematuria  NEUROLOGICAL: No numbness or weakness  SKIN: No itching, burning, rashes, or lesions     PHYSICAL EXAM:  Vital Signs Last 24 Hrs  T(C): 36.9 (30 Sep 2021 07:35), Max: 36.9 (29 Sep 2021 23:35)  T(F): 98.5 (30 Sep 2021 07:35), Max: 98.5 (30 Sep 2021 07:35)  HR: 77 (30 Sep 2021 07:35) (72 - 77)  BP: 103/51 (30 Sep 2021 07:35) (103/51 - 125/61)  BP(mean): --  RR: 17 (30 Sep 2021 07:35) (17 - 18)  SpO2: 98% (30 Sep 2021 07:35) (95% - 98%)    Constitutional: Pt lying in bed, awake and alert, NAD  HEENT: EOMI, normal hearing, moist mucous membranes  Neck: Soft and supple, no JVD  Respiratory: CTAB, No wheezing, rales or rhonchi  Cardiovascular: S1S2+, RRR, no M/G/R  Gastrointestinal: BS+, soft, NT/ND, no guarding, no rebound  Extremities: No peripheral edema  Vascular: 2+ peripheral pulses  Neurological: AAOx3, no focal deficits  Musculoskeletal: 5/5 strength b/l upper and lower extremities  Skin: cellulitis on R cubital fossa resolving

## 2021-10-01 LAB
CULTURE RESULTS: SIGNIFICANT CHANGE UP
SPECIMEN SOURCE: SIGNIFICANT CHANGE UP

## 2021-10-05 ENCOUNTER — EMERGENCY (EMERGENCY)
Facility: HOSPITAL | Age: 61
LOS: 0 days | Discharge: HOME | End: 2021-10-05
Attending: EMERGENCY MEDICINE
Payer: COMMERCIAL

## 2021-10-05 VITALS
HEIGHT: 70 IN | SYSTOLIC BLOOD PRESSURE: 140 MMHG | DIASTOLIC BLOOD PRESSURE: 73 MMHG | TEMPERATURE: 98 F | RESPIRATION RATE: 16 BRPM | HEART RATE: 84 BPM | OXYGEN SATURATION: 100 %

## 2021-10-05 VITALS
DIASTOLIC BLOOD PRESSURE: 68 MMHG | TEMPERATURE: 98 F | SYSTOLIC BLOOD PRESSURE: 128 MMHG | OXYGEN SATURATION: 100 % | HEART RATE: 76 BPM | RESPIRATION RATE: 16 BRPM

## 2021-10-05 DIAGNOSIS — E16.2 HYPOGLYCEMIA, UNSPECIFIED: ICD-10-CM

## 2021-10-05 DIAGNOSIS — G47.33 OBSTRUCTIVE SLEEP APNEA (ADULT) (PEDIATRIC): ICD-10-CM

## 2021-10-05 DIAGNOSIS — J44.9 CHRONIC OBSTRUCTIVE PULMONARY DISEASE, UNSPECIFIED: ICD-10-CM

## 2021-10-05 DIAGNOSIS — Z91.018 ALLERGY TO OTHER FOODS: ICD-10-CM

## 2021-10-05 DIAGNOSIS — I10 ESSENTIAL (PRIMARY) HYPERTENSION: ICD-10-CM

## 2021-10-05 DIAGNOSIS — E11.649 TYPE 2 DIABETES MELLITUS WITH HYPOGLYCEMIA WITHOUT COMA: ICD-10-CM

## 2021-10-05 DIAGNOSIS — Z79.4 LONG TERM (CURRENT) USE OF INSULIN: ICD-10-CM

## 2021-10-05 DIAGNOSIS — Z98.84 BARIATRIC SURGERY STATUS: Chronic | ICD-10-CM

## 2021-10-05 DIAGNOSIS — F31.9 BIPOLAR DISORDER, UNSPECIFIED: ICD-10-CM

## 2021-10-05 DIAGNOSIS — Z86.2 PERSONAL HISTORY OF DISEASES OF THE BLOOD AND BLOOD-FORMING ORGANS AND CERTAIN DISORDERS INVOLVING THE IMMUNE MECHANISM: ICD-10-CM

## 2021-10-05 DIAGNOSIS — F90.9 ATTENTION-DEFICIT HYPERACTIVITY DISORDER, UNSPECIFIED TYPE: ICD-10-CM

## 2021-10-05 DIAGNOSIS — Z79.84 LONG TERM (CURRENT) USE OF ORAL HYPOGLYCEMIC DRUGS: ICD-10-CM

## 2021-10-05 LAB
BASOPHILS # BLD AUTO: 0.08 K/UL — SIGNIFICANT CHANGE UP (ref 0–0.2)
BASOPHILS NFR BLD AUTO: 0.9 % — SIGNIFICANT CHANGE UP (ref 0–2)
EOSINOPHIL # BLD AUTO: 0.15 K/UL — SIGNIFICANT CHANGE UP (ref 0–0.5)
EOSINOPHIL NFR BLD AUTO: 1.7 % — SIGNIFICANT CHANGE UP (ref 0–6)
HCT VFR BLD CALC: 32.3 % — LOW (ref 34.5–45)
HGB BLD-MCNC: 9.8 G/DL — LOW (ref 11.5–15.5)
IMM GRANULOCYTES NFR BLD AUTO: 1.5 % — SIGNIFICANT CHANGE UP (ref 0–1.5)
LYMPHOCYTES # BLD AUTO: 2.99 K/UL — SIGNIFICANT CHANGE UP (ref 1–3.3)
LYMPHOCYTES # BLD AUTO: 33 % — SIGNIFICANT CHANGE UP (ref 13–44)
MCHC RBC-ENTMCNC: 27.1 PG — SIGNIFICANT CHANGE UP (ref 27–34)
MCHC RBC-ENTMCNC: 30.3 GM/DL — LOW (ref 32–36)
MCV RBC AUTO: 89.2 FL — SIGNIFICANT CHANGE UP (ref 80–100)
MONOCYTES # BLD AUTO: 0.59 K/UL — SIGNIFICANT CHANGE UP (ref 0–0.9)
MONOCYTES NFR BLD AUTO: 6.5 % — SIGNIFICANT CHANGE UP (ref 2–14)
NEUTROPHILS # BLD AUTO: 5.12 K/UL — SIGNIFICANT CHANGE UP (ref 1.8–7.4)
NEUTROPHILS NFR BLD AUTO: 56.4 % — SIGNIFICANT CHANGE UP (ref 43–77)
PLATELET # BLD AUTO: 320 K/UL — SIGNIFICANT CHANGE UP (ref 150–400)
RBC # BLD: 3.62 M/UL — LOW (ref 3.8–5.2)
RBC # FLD: 14.3 % — SIGNIFICANT CHANGE UP (ref 10.3–14.5)
WBC # BLD: 9.07 K/UL — SIGNIFICANT CHANGE UP (ref 3.8–10.5)
WBC # FLD AUTO: 9.07 K/UL — SIGNIFICANT CHANGE UP (ref 3.8–10.5)

## 2021-10-05 PROCEDURE — 80053 COMPREHEN METABOLIC PANEL: CPT

## 2021-10-05 PROCEDURE — 85025 COMPLETE CBC W/AUTO DIFF WBC: CPT

## 2021-10-05 PROCEDURE — 36415 COLL VENOUS BLD VENIPUNCTURE: CPT

## 2021-10-05 PROCEDURE — 82962 GLUCOSE BLOOD TEST: CPT

## 2021-10-05 PROCEDURE — 99283 EMERGENCY DEPT VISIT LOW MDM: CPT

## 2021-10-05 PROCEDURE — 99284 EMERGENCY DEPT VISIT MOD MDM: CPT

## 2021-10-05 NOTE — ED PROVIDER NOTE - PATIENT PORTAL LINK FT
You can access the FollowMyHealth Patient Portal offered by [facility] by registering at the following website: [email or web address]. By joining [facility]’s FollowMyHealth portal, you will also be able to view your health information using other applications (apps) compatible with our system.

## 2021-10-05 NOTE — ED ADULT NURSE NOTE - OBJECTIVE STATEMENT
Pt presents to ED for hypoglycemia. Pt states that she was started on lantus and has been having issues with her blood sugar at night. Pt states that she was discharged last Thursday and was d/c on Lantus and has been waking up in the middle of the night for low sugar. Pt states that she woke up in a cold sweat and tried to eat a bar of chocolate. EMS gave pt 1 amp of glucose. Pt denies any chest pain, shortness of breath, abdominal pain, dizziness, lightheadedness, LOC.

## 2021-10-05 NOTE — ED PROVIDER NOTE - NSFOLLOWUPINSTRUCTIONS_ED_ALL_ED_FT
please follow up with your doctor in 2 days as scheduled.   recommend to cut nightly insulin to half dose until you follow up.   return to ED for any concerns

## 2021-10-05 NOTE — ED ADULT TRIAGE NOTE - CHIEF COMPLAINT QUOTE
pt. BIBEMS from home c/o hypoglycemia. Pt. states her insulin doses were recently changed and her BGM has been dipping at night. Pt. awoke in a cold sweat. Pt. BGM initially "LOW" then given amp of glucose by EMS and BGM increased to 91. Pt. has hx. COPD, on home O2 of 4L

## 2021-10-05 NOTE — ED PROVIDER NOTE - OBJECTIVE STATEMENT
62 y/o female in ED c/o hypoglycemic episode x 1 hr resolved with D50 by EMS.   pt states recently d/c from  for hyperglycemia and had her insulin changed.   pt states since d/c from  her BS has dropped every night.   states last night BS was 47 and tonight she awoke with sweats and tremors found with BS of 37.   pt states now improved after D50.   pt denies any fever, HA, cp, sob, n/v/d/abd pain.   states has appt with her PMD in 2 days.

## 2021-10-05 NOTE — ED PROVIDER NOTE - PROGRESS NOTE DETAILS
pt told of results.   advised to f/u with her doctor as scheduled and may take half dose of night time insulin until f/u.

## 2022-03-17 NOTE — DISCHARGE NOTE NURSING/CASE MANAGEMENT/SOCIAL WORK - NSFLUVACAGEDISCH_IMM_ALL_CORE
Adult [Palpitations] : palpitations [Dyspnea on Exertion] : dyspnea on exertion [Negative] : Heme/Lymph [Chest Pain] : no chest pain [Leg Claudication] : no leg claudication [Lower Ext Edema] : no lower extremity edema [Orthopnea] : no orthopnea [Paroxysmal Nocturnal Dyspnea] : no paroxysmal nocturnal dyspnea [Shortness Of Breath] : no shortness of breath [Wheezing] : no wheezing [Cough] : no cough [Nausea] : no nausea [Constipation] : no constipation [Diarrhea] : diarrhea [Heartburn] : no heartburn [Melena] : no melena [Dizziness] : no dizziness [Fainting] : no fainting [Confusion] : no confusion [Memory Loss] : no memory loss [Unsteady Walking] : no ataxia [FreeTextEntry7] : IBS [de-identified] : stable migraine

## 2022-08-14 NOTE — ED PROVIDER NOTE - OBJECTIVE STATEMENT
Pertinent HPI/PMH/PSH/FHx/SHx and Review of Systems contained within  HPI:  Patient bibems from pulmonologist office due glucose >600 despite taking insulin. pt has chronic oakes from copd. no other complaints  PMH/PSH relevant for: COPD (dependent on 4L O2 via NC at home), pulmonary HTN, IDDM, COLLEEN on CPAP, and HTN   ROS negative for: fever, Chest pain, Nausea, vomiting, diarrhea, abdominal pain, dysuria    FamilyHx and SocialHx not otherwise contributory Negative

## 2023-12-11 NOTE — ED PROVIDER NOTE - NEUROLOGICAL, MLM
RASHAD MORA    Patient Age: 18 year old   PHARMACY :    [id number] - [place], [place] - [address] AT [address]  [address] [facility] [address]  Phone: [phone] Fax: [phone]      Last well visit on 08/11/23 with Lorenza Jackson MD.    MEDICATION REQUESTED:  Norgestimate-Ethinyl Estradiol (Tri-Lo-Vianca) 0.18/0.215/0.25 MG-25 MCG tablet   Rx last give on 08/11/23.  Disp: 84    Refills give:  0    Any upcoming appointments: No     Request routed to:  Lorenza Jackson MD   Alert and oriented, no focal deficits, no motor or sensory deficits.
